# Patient Record
Sex: MALE | Race: WHITE | NOT HISPANIC OR LATINO
[De-identification: names, ages, dates, MRNs, and addresses within clinical notes are randomized per-mention and may not be internally consistent; named-entity substitution may affect disease eponyms.]

---

## 2017-01-23 ENCOUNTER — APPOINTMENT (OUTPATIENT)
Dept: CARDIOLOGY | Facility: CLINIC | Age: 77
End: 2017-01-23

## 2017-01-23 VITALS
HEIGHT: 66 IN | OXYGEN SATURATION: 99 % | DIASTOLIC BLOOD PRESSURE: 63 MMHG | HEART RATE: 60 BPM | BODY MASS INDEX: 25.39 KG/M2 | SYSTOLIC BLOOD PRESSURE: 114 MMHG | WEIGHT: 158 LBS

## 2017-02-09 ENCOUNTER — APPOINTMENT (OUTPATIENT)
Dept: CARDIOLOGY | Facility: CLINIC | Age: 77
End: 2017-02-09

## 2017-03-17 ENCOUNTER — APPOINTMENT (OUTPATIENT)
Dept: CARDIOLOGY | Facility: CLINIC | Age: 77
End: 2017-03-17

## 2017-03-17 VITALS
OXYGEN SATURATION: 97 % | WEIGHT: 158 LBS | HEIGHT: 66 IN | HEART RATE: 66 BPM | SYSTOLIC BLOOD PRESSURE: 125 MMHG | DIASTOLIC BLOOD PRESSURE: 73 MMHG | BODY MASS INDEX: 25.39 KG/M2

## 2017-04-21 ENCOUNTER — APPOINTMENT (OUTPATIENT)
Dept: CARDIOLOGY | Facility: CLINIC | Age: 77
End: 2017-04-21

## 2017-04-21 VITALS — SYSTOLIC BLOOD PRESSURE: 120 MMHG | DIASTOLIC BLOOD PRESSURE: 71 MMHG | BODY MASS INDEX: 25.66 KG/M2 | WEIGHT: 159 LBS

## 2017-05-31 ENCOUNTER — OUTPATIENT (OUTPATIENT)
Dept: OUTPATIENT SERVICES | Facility: HOSPITAL | Age: 77
LOS: 1 days | Discharge: HOME | End: 2017-05-31

## 2017-06-07 ENCOUNTER — OTHER (OUTPATIENT)
Age: 77
End: 2017-06-07

## 2017-06-28 DIAGNOSIS — Z01.818 ENCOUNTER FOR OTHER PREPROCEDURAL EXAMINATION: ICD-10-CM

## 2017-06-28 DIAGNOSIS — I48.0 PAROXYSMAL ATRIAL FIBRILLATION: ICD-10-CM

## 2017-07-03 ENCOUNTER — INPATIENT (INPATIENT)
Facility: HOSPITAL | Age: 77
LOS: 2 days | Discharge: HOME | End: 2017-07-06
Attending: INTERNAL MEDICINE

## 2017-07-03 DIAGNOSIS — I10 ESSENTIAL (PRIMARY) HYPERTENSION: ICD-10-CM

## 2017-07-03 DIAGNOSIS — I48.0 PAROXYSMAL ATRIAL FIBRILLATION: ICD-10-CM

## 2017-07-03 DIAGNOSIS — R55 SYNCOPE AND COLLAPSE: ICD-10-CM

## 2017-07-03 DIAGNOSIS — I45.9 CONDUCTION DISORDER, UNSPECIFIED: ICD-10-CM

## 2017-07-03 DIAGNOSIS — I48.91 UNSPECIFIED ATRIAL FIBRILLATION: ICD-10-CM

## 2017-07-03 DIAGNOSIS — I42.9 CARDIOMYOPATHY, UNSPECIFIED: ICD-10-CM

## 2017-07-11 ENCOUNTER — OUTPATIENT (OUTPATIENT)
Dept: OUTPATIENT SERVICES | Facility: HOSPITAL | Age: 77
LOS: 1 days | Discharge: HOME | End: 2017-07-11

## 2017-07-11 DIAGNOSIS — R55 SYNCOPE AND COLLAPSE: ICD-10-CM

## 2017-07-11 DIAGNOSIS — I48.91 UNSPECIFIED ATRIAL FIBRILLATION: ICD-10-CM

## 2017-07-11 DIAGNOSIS — I45.9 CONDUCTION DISORDER, UNSPECIFIED: ICD-10-CM

## 2017-07-11 DIAGNOSIS — Z79.01 LONG TERM (CURRENT) USE OF ANTICOAGULANTS: ICD-10-CM

## 2017-07-11 DIAGNOSIS — I10 ESSENTIAL (PRIMARY) HYPERTENSION: ICD-10-CM

## 2017-07-11 DIAGNOSIS — I42.9 CARDIOMYOPATHY, UNSPECIFIED: ICD-10-CM

## 2017-07-12 ENCOUNTER — OUTPATIENT (OUTPATIENT)
Dept: OUTPATIENT SERVICES | Facility: HOSPITAL | Age: 77
LOS: 1 days | Discharge: HOME | End: 2017-07-12

## 2017-07-12 DIAGNOSIS — E83.42 HYPOMAGNESEMIA: ICD-10-CM

## 2017-07-12 DIAGNOSIS — I42.9 CARDIOMYOPATHY, UNSPECIFIED: ICD-10-CM

## 2017-07-12 DIAGNOSIS — I25.10 ATHEROSCLEROTIC HEART DISEASE OF NATIVE CORONARY ARTERY WITHOUT ANGINA PECTORIS: ICD-10-CM

## 2017-07-12 DIAGNOSIS — I10 ESSENTIAL (PRIMARY) HYPERTENSION: ICD-10-CM

## 2017-07-12 DIAGNOSIS — R55 SYNCOPE AND COLLAPSE: ICD-10-CM

## 2017-07-12 DIAGNOSIS — Z72.0 TOBACCO USE: ICD-10-CM

## 2017-07-12 DIAGNOSIS — I45.9 CONDUCTION DISORDER, UNSPECIFIED: ICD-10-CM

## 2017-07-12 DIAGNOSIS — I13.0 HYPERTENSIVE HEART AND CHRONIC KIDNEY DISEASE WITH HEART FAILURE AND STAGE 1 THROUGH STAGE 4 CHRONIC KIDNEY DISEASE, OR UNSPECIFIED CHRONIC KIDNEY DISEASE: ICD-10-CM

## 2017-07-12 DIAGNOSIS — I48.91 UNSPECIFIED ATRIAL FIBRILLATION: ICD-10-CM

## 2017-07-12 DIAGNOSIS — Z95.810 PRESENCE OF AUTOMATIC (IMPLANTABLE) CARDIAC DEFIBRILLATOR: ICD-10-CM

## 2017-07-12 DIAGNOSIS — N18.3 CHRONIC KIDNEY DISEASE, STAGE 3 (MODERATE): ICD-10-CM

## 2017-07-12 DIAGNOSIS — Z95.5 PRESENCE OF CORONARY ANGIOPLASTY IMPLANT AND GRAFT: ICD-10-CM

## 2017-07-13 DIAGNOSIS — Z00.6 ENCOUNTER FOR EXAMINATION FOR NORMAL COMPARISON AND CONTROL IN CLINICAL RESEARCH PROGRAM: ICD-10-CM

## 2017-07-14 ENCOUNTER — OUTPATIENT (OUTPATIENT)
Dept: OUTPATIENT SERVICES | Facility: HOSPITAL | Age: 77
LOS: 1 days | Discharge: HOME | End: 2017-07-14

## 2017-07-14 DIAGNOSIS — Z79.01 LONG TERM (CURRENT) USE OF ANTICOAGULANTS: ICD-10-CM

## 2017-07-14 DIAGNOSIS — I48.91 UNSPECIFIED ATRIAL FIBRILLATION: ICD-10-CM

## 2017-07-14 DIAGNOSIS — R55 SYNCOPE AND COLLAPSE: ICD-10-CM

## 2017-07-14 DIAGNOSIS — I10 ESSENTIAL (PRIMARY) HYPERTENSION: ICD-10-CM

## 2017-07-14 DIAGNOSIS — I45.9 CONDUCTION DISORDER, UNSPECIFIED: ICD-10-CM

## 2017-07-14 DIAGNOSIS — I42.9 CARDIOMYOPATHY, UNSPECIFIED: ICD-10-CM

## 2017-07-17 ENCOUNTER — OUTPATIENT (OUTPATIENT)
Dept: OUTPATIENT SERVICES | Facility: HOSPITAL | Age: 77
LOS: 1 days | Discharge: HOME | End: 2017-07-17

## 2017-07-17 DIAGNOSIS — I45.9 CONDUCTION DISORDER, UNSPECIFIED: ICD-10-CM

## 2017-07-17 DIAGNOSIS — I48.91 UNSPECIFIED ATRIAL FIBRILLATION: ICD-10-CM

## 2017-07-17 DIAGNOSIS — I10 ESSENTIAL (PRIMARY) HYPERTENSION: ICD-10-CM

## 2017-07-17 DIAGNOSIS — Z79.01 LONG TERM (CURRENT) USE OF ANTICOAGULANTS: ICD-10-CM

## 2017-07-17 DIAGNOSIS — I42.9 CARDIOMYOPATHY, UNSPECIFIED: ICD-10-CM

## 2017-07-17 DIAGNOSIS — R55 SYNCOPE AND COLLAPSE: ICD-10-CM

## 2017-07-18 DIAGNOSIS — N99.89 OTHER POSTPROCEDURAL COMPLICATIONS AND DISORDERS OF GENITOURINARY SYSTEM: ICD-10-CM

## 2017-07-18 DIAGNOSIS — I48.91 UNSPECIFIED ATRIAL FIBRILLATION: ICD-10-CM

## 2017-07-18 DIAGNOSIS — R33.9 RETENTION OF URINE, UNSPECIFIED: ICD-10-CM

## 2017-07-18 DIAGNOSIS — I50.22 CHRONIC SYSTOLIC (CONGESTIVE) HEART FAILURE: ICD-10-CM

## 2017-07-21 ENCOUNTER — OUTPATIENT (OUTPATIENT)
Dept: OUTPATIENT SERVICES | Facility: HOSPITAL | Age: 77
LOS: 1 days | Discharge: HOME | End: 2017-07-21

## 2017-07-21 DIAGNOSIS — I42.9 CARDIOMYOPATHY, UNSPECIFIED: ICD-10-CM

## 2017-07-21 DIAGNOSIS — R55 SYNCOPE AND COLLAPSE: ICD-10-CM

## 2017-07-21 DIAGNOSIS — I45.9 CONDUCTION DISORDER, UNSPECIFIED: ICD-10-CM

## 2017-07-21 DIAGNOSIS — I48.91 UNSPECIFIED ATRIAL FIBRILLATION: ICD-10-CM

## 2017-07-21 DIAGNOSIS — I10 ESSENTIAL (PRIMARY) HYPERTENSION: ICD-10-CM

## 2017-07-21 DIAGNOSIS — Z79.01 LONG TERM (CURRENT) USE OF ANTICOAGULANTS: ICD-10-CM

## 2017-07-28 ENCOUNTER — OUTPATIENT (OUTPATIENT)
Dept: OUTPATIENT SERVICES | Facility: HOSPITAL | Age: 77
LOS: 1 days | Discharge: HOME | End: 2017-07-28

## 2017-07-28 DIAGNOSIS — I48.91 UNSPECIFIED ATRIAL FIBRILLATION: ICD-10-CM

## 2017-07-28 DIAGNOSIS — I45.9 CONDUCTION DISORDER, UNSPECIFIED: ICD-10-CM

## 2017-07-28 DIAGNOSIS — Z79.01 LONG TERM (CURRENT) USE OF ANTICOAGULANTS: ICD-10-CM

## 2017-07-28 DIAGNOSIS — I10 ESSENTIAL (PRIMARY) HYPERTENSION: ICD-10-CM

## 2017-07-28 DIAGNOSIS — R55 SYNCOPE AND COLLAPSE: ICD-10-CM

## 2017-07-28 DIAGNOSIS — I42.9 CARDIOMYOPATHY, UNSPECIFIED: ICD-10-CM

## 2017-08-04 ENCOUNTER — OUTPATIENT (OUTPATIENT)
Dept: OUTPATIENT SERVICES | Facility: HOSPITAL | Age: 77
LOS: 1 days | Discharge: HOME | End: 2017-08-04

## 2017-08-04 DIAGNOSIS — I45.9 CONDUCTION DISORDER, UNSPECIFIED: ICD-10-CM

## 2017-08-04 DIAGNOSIS — R55 SYNCOPE AND COLLAPSE: ICD-10-CM

## 2017-08-04 DIAGNOSIS — I42.9 CARDIOMYOPATHY, UNSPECIFIED: ICD-10-CM

## 2017-08-04 DIAGNOSIS — I10 ESSENTIAL (PRIMARY) HYPERTENSION: ICD-10-CM

## 2017-08-04 DIAGNOSIS — Z79.01 LONG TERM (CURRENT) USE OF ANTICOAGULANTS: ICD-10-CM

## 2017-08-04 DIAGNOSIS — I48.91 UNSPECIFIED ATRIAL FIBRILLATION: ICD-10-CM

## 2017-08-10 ENCOUNTER — APPOINTMENT (OUTPATIENT)
Dept: CARDIOLOGY | Facility: CLINIC | Age: 77
End: 2017-08-10

## 2017-08-10 ENCOUNTER — OUTPATIENT (OUTPATIENT)
Dept: OUTPATIENT SERVICES | Facility: HOSPITAL | Age: 77
LOS: 1 days | Discharge: HOME | End: 2017-08-10

## 2017-08-10 VITALS — SYSTOLIC BLOOD PRESSURE: 122 MMHG | DIASTOLIC BLOOD PRESSURE: 70 MMHG

## 2017-08-10 VITALS — BODY MASS INDEX: 25.18 KG/M2 | WEIGHT: 156 LBS

## 2017-08-10 DIAGNOSIS — I10 ESSENTIAL (PRIMARY) HYPERTENSION: ICD-10-CM

## 2017-08-10 DIAGNOSIS — I42.9 CARDIOMYOPATHY, UNSPECIFIED: ICD-10-CM

## 2017-08-10 DIAGNOSIS — I45.9 CONDUCTION DISORDER, UNSPECIFIED: ICD-10-CM

## 2017-08-10 DIAGNOSIS — R55 SYNCOPE AND COLLAPSE: ICD-10-CM

## 2017-08-10 DIAGNOSIS — I48.91 UNSPECIFIED ATRIAL FIBRILLATION: ICD-10-CM

## 2017-08-10 DIAGNOSIS — I48.0 PAROXYSMAL ATRIAL FIBRILLATION: ICD-10-CM

## 2017-08-18 ENCOUNTER — OUTPATIENT (OUTPATIENT)
Dept: OUTPATIENT SERVICES | Facility: HOSPITAL | Age: 77
LOS: 1 days | Discharge: HOME | End: 2017-08-18

## 2017-08-18 DIAGNOSIS — I48.91 UNSPECIFIED ATRIAL FIBRILLATION: ICD-10-CM

## 2017-08-18 DIAGNOSIS — I45.9 CONDUCTION DISORDER, UNSPECIFIED: ICD-10-CM

## 2017-08-18 DIAGNOSIS — R55 SYNCOPE AND COLLAPSE: ICD-10-CM

## 2017-08-18 DIAGNOSIS — I10 ESSENTIAL (PRIMARY) HYPERTENSION: ICD-10-CM

## 2017-08-18 DIAGNOSIS — I42.9 CARDIOMYOPATHY, UNSPECIFIED: ICD-10-CM

## 2017-08-18 DIAGNOSIS — Z79.01 LONG TERM (CURRENT) USE OF ANTICOAGULANTS: ICD-10-CM

## 2017-08-24 ENCOUNTER — OUTPATIENT (OUTPATIENT)
Dept: OUTPATIENT SERVICES | Facility: HOSPITAL | Age: 77
LOS: 1 days | Discharge: HOME | End: 2017-08-24

## 2017-08-24 ENCOUNTER — RESULT REVIEW (OUTPATIENT)
Age: 77
End: 2017-08-24

## 2017-08-24 DIAGNOSIS — I10 ESSENTIAL (PRIMARY) HYPERTENSION: ICD-10-CM

## 2017-08-24 DIAGNOSIS — I48.0 PAROXYSMAL ATRIAL FIBRILLATION: ICD-10-CM

## 2017-08-24 DIAGNOSIS — I42.9 CARDIOMYOPATHY, UNSPECIFIED: ICD-10-CM

## 2017-08-24 DIAGNOSIS — R55 SYNCOPE AND COLLAPSE: ICD-10-CM

## 2017-08-24 DIAGNOSIS — I48.91 UNSPECIFIED ATRIAL FIBRILLATION: ICD-10-CM

## 2017-08-24 DIAGNOSIS — I45.9 CONDUCTION DISORDER, UNSPECIFIED: ICD-10-CM

## 2017-08-24 RX ORDER — WARFARIN 5 MG/1
5 TABLET ORAL DAILY
Qty: 20 | Refills: 0 | Status: DISCONTINUED | COMMUNITY
Start: 2017-08-10 | End: 2017-08-24

## 2017-09-26 ENCOUNTER — CLINICAL ADVICE (OUTPATIENT)
Age: 77
End: 2017-09-26

## 2017-09-26 ENCOUNTER — RESULT REVIEW (OUTPATIENT)
Age: 77
End: 2017-09-26

## 2017-09-26 ENCOUNTER — OUTPATIENT (OUTPATIENT)
Dept: OUTPATIENT SERVICES | Facility: HOSPITAL | Age: 77
LOS: 1 days | Discharge: HOME | End: 2017-09-26

## 2017-09-26 DIAGNOSIS — R55 SYNCOPE AND COLLAPSE: ICD-10-CM

## 2017-09-26 DIAGNOSIS — I48.2 CHRONIC ATRIAL FIBRILLATION: ICD-10-CM

## 2017-09-26 DIAGNOSIS — I48.91 UNSPECIFIED ATRIAL FIBRILLATION: ICD-10-CM

## 2017-09-26 DIAGNOSIS — I45.9 CONDUCTION DISORDER, UNSPECIFIED: ICD-10-CM

## 2017-09-26 DIAGNOSIS — I10 ESSENTIAL (PRIMARY) HYPERTENSION: ICD-10-CM

## 2017-09-26 DIAGNOSIS — I42.9 CARDIOMYOPATHY, UNSPECIFIED: ICD-10-CM

## 2017-10-27 ENCOUNTER — APPOINTMENT (OUTPATIENT)
Dept: CARDIOLOGY | Facility: CLINIC | Age: 77
End: 2017-10-27

## 2017-10-27 VITALS — WEIGHT: 155 LBS | BODY MASS INDEX: 25.02 KG/M2

## 2017-10-27 VITALS — SYSTOLIC BLOOD PRESSURE: 110 MMHG | OXYGEN SATURATION: 98 % | HEART RATE: 68 BPM | DIASTOLIC BLOOD PRESSURE: 70 MMHG

## 2017-10-31 ENCOUNTER — OUTPATIENT (OUTPATIENT)
Dept: OUTPATIENT SERVICES | Facility: HOSPITAL | Age: 77
LOS: 1 days | Discharge: HOME | End: 2017-10-31

## 2017-10-31 DIAGNOSIS — R73.01 IMPAIRED FASTING GLUCOSE: ICD-10-CM

## 2017-10-31 DIAGNOSIS — I48.91 UNSPECIFIED ATRIAL FIBRILLATION: ICD-10-CM

## 2017-10-31 DIAGNOSIS — I42.9 CARDIOMYOPATHY, UNSPECIFIED: ICD-10-CM

## 2017-10-31 DIAGNOSIS — I10 ESSENTIAL (PRIMARY) HYPERTENSION: ICD-10-CM

## 2017-10-31 DIAGNOSIS — I45.9 CONDUCTION DISORDER, UNSPECIFIED: ICD-10-CM

## 2017-10-31 DIAGNOSIS — R55 SYNCOPE AND COLLAPSE: ICD-10-CM

## 2018-02-09 ENCOUNTER — APPOINTMENT (OUTPATIENT)
Dept: CARDIOLOGY | Facility: CLINIC | Age: 78
End: 2018-02-09

## 2018-02-09 VITALS — BODY MASS INDEX: 25.5 KG/M2 | SYSTOLIC BLOOD PRESSURE: 120 MMHG | WEIGHT: 158 LBS | DIASTOLIC BLOOD PRESSURE: 70 MMHG

## 2018-02-09 RX ORDER — BENAZEPRIL HYDROCHLORIDE 10 MG/1
10 TABLET, FILM COATED ORAL
Refills: 0 | Status: DISCONTINUED | COMMUNITY
End: 2018-02-09

## 2018-02-26 ENCOUNTER — OUTPATIENT (OUTPATIENT)
Dept: OUTPATIENT SERVICES | Facility: HOSPITAL | Age: 78
LOS: 1 days | Discharge: HOME | End: 2018-02-26

## 2018-02-26 DIAGNOSIS — I38 ENDOCARDITIS, VALVE UNSPECIFIED: ICD-10-CM

## 2018-02-26 DIAGNOSIS — E78.5 HYPERLIPIDEMIA, UNSPECIFIED: ICD-10-CM

## 2018-05-18 ENCOUNTER — OTHER (OUTPATIENT)
Age: 78
End: 2018-05-18

## 2018-07-05 ENCOUNTER — OUTPATIENT (OUTPATIENT)
Dept: OUTPATIENT SERVICES | Facility: HOSPITAL | Age: 78
LOS: 1 days | Discharge: HOME | End: 2018-07-05

## 2018-07-05 DIAGNOSIS — R73.01 IMPAIRED FASTING GLUCOSE: ICD-10-CM

## 2018-08-07 ENCOUNTER — OUTPATIENT (OUTPATIENT)
Dept: OUTPATIENT SERVICES | Facility: HOSPITAL | Age: 78
LOS: 1 days | Discharge: HOME | End: 2018-08-07

## 2018-08-07 DIAGNOSIS — E87.5 HYPERKALEMIA: ICD-10-CM

## 2018-10-19 ENCOUNTER — APPOINTMENT (OUTPATIENT)
Dept: CARDIOLOGY | Facility: CLINIC | Age: 78
End: 2018-10-19

## 2018-10-19 VITALS — DIASTOLIC BLOOD PRESSURE: 66 MMHG | SYSTOLIC BLOOD PRESSURE: 128 MMHG | WEIGHT: 156 LBS | BODY MASS INDEX: 25.18 KG/M2

## 2018-10-19 RX ORDER — CLOPIDOGREL BISULFATE 75 MG/1
75 TABLET, FILM COATED ORAL DAILY
Qty: 90 | Refills: 3 | Status: DISCONTINUED | COMMUNITY
Start: 2017-08-24 | End: 2018-10-19

## 2018-12-31 ENCOUNTER — OUTPATIENT (OUTPATIENT)
Dept: OUTPATIENT SERVICES | Facility: HOSPITAL | Age: 78
LOS: 1 days | Discharge: HOME | End: 2018-12-31

## 2018-12-31 DIAGNOSIS — R73.01 IMPAIRED FASTING GLUCOSE: ICD-10-CM

## 2019-03-01 ENCOUNTER — OUTPATIENT (OUTPATIENT)
Dept: OUTPATIENT SERVICES | Facility: HOSPITAL | Age: 79
LOS: 1 days | Discharge: HOME | End: 2019-03-01

## 2019-03-01 DIAGNOSIS — E83.52 HYPERCALCEMIA: ICD-10-CM

## 2019-03-01 DIAGNOSIS — E78.00 PURE HYPERCHOLESTEROLEMIA, UNSPECIFIED: ICD-10-CM

## 2019-03-01 DIAGNOSIS — D64.9 ANEMIA, UNSPECIFIED: ICD-10-CM

## 2019-03-01 DIAGNOSIS — N18.3 CHRONIC KIDNEY DISEASE, STAGE 3 (MODERATE): ICD-10-CM

## 2019-04-19 ENCOUNTER — APPOINTMENT (OUTPATIENT)
Dept: CARDIOLOGY | Facility: CLINIC | Age: 79
End: 2019-04-19
Payer: MEDICARE

## 2019-04-19 VITALS
WEIGHT: 156 LBS | HEIGHT: 66 IN | BODY MASS INDEX: 25.07 KG/M2 | DIASTOLIC BLOOD PRESSURE: 76 MMHG | SYSTOLIC BLOOD PRESSURE: 110 MMHG

## 2019-04-19 PROCEDURE — 93284 PRGRMG EVAL IMPLANTABLE DFB: CPT

## 2019-04-19 PROCEDURE — 99213 OFFICE O/P EST LOW 20 MIN: CPT

## 2019-04-19 NOTE — PHYSICAL EXAM
[Heart Rate And Rhythm] : heart rate and rhythm were normal [General Appearance - Well Developed] : well developed [Arterial Pulses Normal] : the arterial pulses were normal [] : no respiratory distress [Heart Sounds] : normal S1 and S2 [Clean] : clean [Left Infraclavicular] : left infraclavicular area [Respiration, Rhythm And Depth] : normal respiratory rhythm and effort [Dry] : dry [Well-Healed] : well-healed [Abdomen Soft] : soft [Bowel Sounds] : normal bowel sounds [Nail Clubbing] : no clubbing of the fingernails [Cyanosis, Localized] : no localized cyanosis

## 2019-05-01 ENCOUNTER — OUTPATIENT (OUTPATIENT)
Dept: OUTPATIENT SERVICES | Facility: HOSPITAL | Age: 79
LOS: 1 days | Discharge: HOME | End: 2019-05-01

## 2019-05-01 DIAGNOSIS — R73.01 IMPAIRED FASTING GLUCOSE: ICD-10-CM

## 2019-05-27 NOTE — PROCEDURE
[No] : not [NSR] : normal sinus rhythm [CRT-D] : Cardiac resynchronization therapy defibrillator [DDDR] : DDDR [Voltage: ___ volts] : Voltage was [unfilled] volts [Charge Time: ___ sec] : charge time was [unfilled] seconds [Longevity: ___ months] : The estimated remaining battery life is [unfilled] months [Sensing Amplitude ___mv] : sensing amplitude was [unfilled] mv [___V @] : [unfilled] V [Lead Imp:  ___ohms] : lead impedance was [unfilled] ohms [Programmed for Longevity] : output reprogrammed for improved battery longevity [___ ms] : [unfilled] ms [Asense-Vsense ___ %] : Asense-Vsense [unfilled]% [Asense-Vpace ___ %] : Asense-Vpace [unfilled]% [Apace-Vsense ___ %] : Apace-Vsense [unfilled]% [Apace-Vpace ___ %] : Apace-Vpace [unfilled]% [de-identified] : Medtronic [de-identified] : [id number] [de-identified] : Amplia MRI Quad [de-identified] : 60 [de-identified] : 6/30/16 [de-identified] : 1

## 2019-05-27 NOTE — REVIEW OF SYSTEMS
[Easy Bruising] : a tendency for easy bruising [Fever] : no fever [Blurry Vision] : no blurred vision [Chest Pain] : no chest pain [Shortness Of Breath] : no shortness of breath [Lower Ext Edema] : no extremity edema [Palpitations] : no palpitations [Abdominal Pain] : no abdominal pain [Cough] : no cough [Nocturia] : no nocturia [Urinary Frequency] : no change in urinary frequency [Joint Pain] : no joint pain [Skin: A Rash] : no rash: [Dizziness] : no dizziness [Confusion] : no confusion was observed [Anxiety] : no anxiety [Excessive Thirst] : no polydipsia [Easy Bleeding] : no tendency for easy bleeding

## 2019-05-27 NOTE — HISTORY OF PRESENT ILLNESS
[Erythema at Site] : no erythema at device site [Swelling at Site] : no swelling at device site [de-identified] : \par 78 years old male with pmh of CAD, MI S/P PTCA with stent, ICM/ HF -NYHA II ,  S/P CRT- D , PAF, GI bleed on Eliquis . He underwent a Watchman procedure on 7/6/2017 , S/P HILARIA , Stopped Warfarin 8/24/2017\par Denies CP/SOB/palpitations \par NO dizziness or syncope \par Denies AICD shocks \par No LE edema\par Good activity and exercise tolerance, jogs on a treadmill 7 days

## 2019-07-12 ENCOUNTER — OTHER (OUTPATIENT)
Age: 79
End: 2019-07-12

## 2019-08-08 ENCOUNTER — APPOINTMENT (OUTPATIENT)
Dept: CARDIOLOGY | Facility: CLINIC | Age: 79
End: 2019-08-08
Payer: MEDICARE

## 2019-08-08 PROCEDURE — 93295 DEV INTERROG REMOTE 1/2/MLT: CPT

## 2019-08-08 PROCEDURE — 93296 REM INTERROG EVL PM/IDS: CPT

## 2019-08-12 ENCOUNTER — EMERGENCY (EMERGENCY)
Facility: HOSPITAL | Age: 79
LOS: 0 days | Discharge: HOME | End: 2019-08-12
Attending: EMERGENCY MEDICINE | Admitting: EMERGENCY MEDICINE
Payer: MEDICARE

## 2019-08-12 VITALS — DIASTOLIC BLOOD PRESSURE: 62 MMHG | RESPIRATION RATE: 18 BRPM | HEART RATE: 76 BPM | SYSTOLIC BLOOD PRESSURE: 132 MMHG

## 2019-08-12 VITALS
TEMPERATURE: 97 F | DIASTOLIC BLOOD PRESSURE: 57 MMHG | SYSTOLIC BLOOD PRESSURE: 127 MMHG | RESPIRATION RATE: 18 BRPM | HEART RATE: 82 BPM | OXYGEN SATURATION: 99 %

## 2019-08-12 DIAGNOSIS — N39.0 URINARY TRACT INFECTION, SITE NOT SPECIFIED: ICD-10-CM

## 2019-08-12 DIAGNOSIS — R33.9 RETENTION OF URINE, UNSPECIFIED: ICD-10-CM

## 2019-08-12 LAB
ALBUMIN SERPL ELPH-MCNC: 4.3 G/DL — SIGNIFICANT CHANGE UP (ref 3.5–5.2)
ALP SERPL-CCNC: 53 U/L — SIGNIFICANT CHANGE UP (ref 30–115)
ALT FLD-CCNC: 19 U/L — SIGNIFICANT CHANGE UP (ref 0–41)
ANION GAP SERPL CALC-SCNC: 14 MMOL/L — SIGNIFICANT CHANGE UP (ref 7–14)
APPEARANCE UR: CLEAR — SIGNIFICANT CHANGE UP
AST SERPL-CCNC: 20 U/L — SIGNIFICANT CHANGE UP (ref 0–41)
BACTERIA # UR AUTO: ABNORMAL /HPF
BASOPHILS # BLD AUTO: 0.02 K/UL — SIGNIFICANT CHANGE UP (ref 0–0.2)
BASOPHILS NFR BLD AUTO: 0.3 % — SIGNIFICANT CHANGE UP (ref 0–1)
BILIRUB SERPL-MCNC: 0.5 MG/DL — SIGNIFICANT CHANGE UP (ref 0.2–1.2)
BILIRUB UR-MCNC: NEGATIVE — SIGNIFICANT CHANGE UP
BUN SERPL-MCNC: 41 MG/DL — HIGH (ref 10–20)
CALCIUM SERPL-MCNC: 9.6 MG/DL — SIGNIFICANT CHANGE UP (ref 8.5–10.1)
CHLORIDE SERPL-SCNC: 100 MMOL/L — SIGNIFICANT CHANGE UP (ref 98–110)
CO2 SERPL-SCNC: 24 MMOL/L — SIGNIFICANT CHANGE UP (ref 17–32)
COLOR SPEC: YELLOW — SIGNIFICANT CHANGE UP
CREAT SERPL-MCNC: 1.9 MG/DL — HIGH (ref 0.7–1.5)
DIFF PNL FLD: NEGATIVE — SIGNIFICANT CHANGE UP
EOSINOPHIL # BLD AUTO: 0.12 K/UL — SIGNIFICANT CHANGE UP (ref 0–0.7)
EOSINOPHIL NFR BLD AUTO: 1.9 % — SIGNIFICANT CHANGE UP (ref 0–8)
GLUCOSE SERPL-MCNC: 108 MG/DL — HIGH (ref 70–99)
GLUCOSE UR QL: NEGATIVE — SIGNIFICANT CHANGE UP
HCT VFR BLD CALC: 36.4 % — LOW (ref 42–52)
HGB BLD-MCNC: 11.6 G/DL — LOW (ref 14–18)
IMM GRANULOCYTES NFR BLD AUTO: 0.5 % — HIGH (ref 0.1–0.3)
KETONES UR-MCNC: NEGATIVE — SIGNIFICANT CHANGE UP
LEUKOCYTE ESTERASE UR-ACNC: ABNORMAL
LYMPHOCYTES # BLD AUTO: 1.14 K/UL — LOW (ref 1.2–3.4)
LYMPHOCYTES # BLD AUTO: 18.4 % — LOW (ref 20.5–51.1)
MCHC RBC-ENTMCNC: 27 PG — SIGNIFICANT CHANGE UP (ref 27–31)
MCHC RBC-ENTMCNC: 31.9 G/DL — LOW (ref 32–37)
MCV RBC AUTO: 84.7 FL — SIGNIFICANT CHANGE UP (ref 80–94)
MONOCYTES # BLD AUTO: 0.71 K/UL — HIGH (ref 0.1–0.6)
MONOCYTES NFR BLD AUTO: 11.5 % — HIGH (ref 1.7–9.3)
NEUTROPHILS # BLD AUTO: 4.16 K/UL — SIGNIFICANT CHANGE UP (ref 1.4–6.5)
NEUTROPHILS NFR BLD AUTO: 67.4 % — SIGNIFICANT CHANGE UP (ref 42.2–75.2)
NITRITE UR-MCNC: NEGATIVE — SIGNIFICANT CHANGE UP
NRBC # BLD: 0 /100 WBCS — SIGNIFICANT CHANGE UP (ref 0–0)
PH UR: 6 — SIGNIFICANT CHANGE UP (ref 5–8)
PLATELET # BLD AUTO: 241 K/UL — SIGNIFICANT CHANGE UP (ref 130–400)
POTASSIUM SERPL-MCNC: 4.4 MMOL/L — SIGNIFICANT CHANGE UP (ref 3.5–5)
POTASSIUM SERPL-SCNC: 4.4 MMOL/L — SIGNIFICANT CHANGE UP (ref 3.5–5)
PROT SERPL-MCNC: 7.1 G/DL — SIGNIFICANT CHANGE UP (ref 6–8)
PROT UR-MCNC: ABNORMAL
RBC # BLD: 4.3 M/UL — LOW (ref 4.7–6.1)
RBC # FLD: 13.2 % — SIGNIFICANT CHANGE UP (ref 11.5–14.5)
SODIUM SERPL-SCNC: 138 MMOL/L — SIGNIFICANT CHANGE UP (ref 135–146)
SP GR SPEC: 1.02 — SIGNIFICANT CHANGE UP (ref 1.01–1.03)
UROBILINOGEN FLD QL: 0.2 — SIGNIFICANT CHANGE UP (ref 0.2–0.2)
WBC # BLD: 6.18 K/UL — SIGNIFICANT CHANGE UP (ref 4.8–10.8)
WBC # FLD AUTO: 6.18 K/UL — SIGNIFICANT CHANGE UP (ref 4.8–10.8)
WBC UR QL: ABNORMAL /HPF

## 2019-08-12 PROCEDURE — 99284 EMERGENCY DEPT VISIT MOD MDM: CPT

## 2019-08-12 RX ORDER — CEFPODOXIME PROXETIL 100 MG
1 TABLET ORAL
Qty: 14 | Refills: 0
Start: 2019-08-12 | End: 2019-08-18

## 2019-08-12 RX ORDER — CEFTRIAXONE 500 MG/1
1000 INJECTION, POWDER, FOR SOLUTION INTRAMUSCULAR; INTRAVENOUS ONCE
Refills: 0 | Status: COMPLETED | OUTPATIENT
Start: 2019-08-12 | End: 2019-08-12

## 2019-08-12 RX ORDER — SODIUM CHLORIDE 9 MG/ML
1000 INJECTION, SOLUTION INTRAVENOUS ONCE
Refills: 0 | Status: COMPLETED | OUTPATIENT
Start: 2019-08-12 | End: 2019-08-12

## 2019-08-12 RX ADMIN — CEFTRIAXONE 1000 MILLIGRAM(S): 500 INJECTION, POWDER, FOR SOLUTION INTRAMUSCULAR; INTRAVENOUS at 20:35

## 2019-08-12 RX ADMIN — SODIUM CHLORIDE 1000 MILLILITER(S): 9 INJECTION, SOLUTION INTRAVENOUS at 17:12

## 2019-08-12 RX ADMIN — CEFTRIAXONE 100 MILLIGRAM(S): 500 INJECTION, POWDER, FOR SOLUTION INTRAMUSCULAR; INTRAVENOUS at 20:35

## 2019-08-12 RX ADMIN — SODIUM CHLORIDE 1000 MILLILITER(S): 9 INJECTION, SOLUTION INTRAVENOUS at 20:35

## 2019-08-12 NOTE — ED ADULT NURSE NOTE - NSIMPLEMENTINTERV_GEN_ALL_ED
Implemented All Universal Safety Interventions:  [place] to call system. Call bell, personal items and telephone within reach. Instruct patient to call for assistance. Room bathroom lighting operational. Non-slip footwear when patient is off stretcher. Physically safe environment: no spills, clutter or unnecessary equipment. Stretcher in lowest position, wheels locked, appropriate side rails in place.

## 2019-08-12 NOTE — ED PROVIDER NOTE - CLINICAL SUMMARY MEDICAL DECISION MAKING FREE TEXT BOX
pt was able to urinate on his own after IVF, UA+ and pt symptomatic, will dc home w po abx, f/u  1-2 weeks, strict return precautions provided.

## 2019-08-12 NOTE — ED PROVIDER NOTE - NS ED ROS FT
Constitutional: See HPI.  Eyes: No visual changes,  ENMT: No neck pain  Cardiac: No cp  Respiratory: No cough  GI: No nausea, vomiting, diarrhea or abdominal pain.  : see hpi  MS: No myalgia, muscle weakness, joint pain or back pain.  Neuro: No headache or weakness  Skin: No skin rash.

## 2019-08-12 NOTE — ED ADULT NURSE REASSESSMENT NOTE - NS ED NURSE REASSESS COMMENT FT1
patient bladder scann showed 65cc of urine, patient just voided 50cc. ua and culture sent to lab. awaiting results. as per md no need for urinary cath at this time

## 2019-08-12 NOTE — ED PROVIDER NOTE - CARE PROVIDER_API CALL
Chance Oro)  Urology  [address] [place] [address]  Phone: [phone]  Fax: [phone]  Follow Up Time: 1-3 Days

## 2019-08-12 NOTE — ED PROVIDER NOTE - OBJECTIVE STATEMENT
78 year old male w/ a pmh of htn hld & pacemaker presents here for urinary retention. Patient states for the last 3 days he's been having frequent urinary no hematuria or burning with urination. patient went to see his pmd today and was told to give urine but has been unable to void. Patient last voided at 1pm today. Patient denies any fever chills cough cp sob n/v abdominal pain.

## 2019-08-12 NOTE — ED PROVIDER NOTE - ATTENDING CONTRIBUTION TO CARE
78M PMH CAD stent AICD, CKD ([facility]), HTM, HL, BPH, sent by pmd for urinary retention since 1pm. states he went to PMD today c/o 3 days dysuria, urinary freq. no hematuria. no fever. no n/v. no abd pain, flank pain. his pmd dr vicente sent him to obtain urine sample r/o infection but he was unable to provide sample and was sent by nurses to ED.    on exam, AFVSS, well tj nad, ncat, eomi, perrla, mmm, lctab, rrr nl s1s2 no mrg, abd soft ntnd, no cvat, aaox3, no focal deficits, no le edema or calf ttp,     POCUS bladder- less than 50cc Urine.     a/p; unlikely urinary retention, likely UTI, will do labs, UA/cxr, ivf, re-eval.

## 2019-08-12 NOTE — ED ADULT NURSE NOTE - OBJECTIVE STATEMENT
Pt presented to ED d/t urinary retention. Pt unable to urinate since 1300, c/o dysuria x 3 days. Denies n/v/d/fevers/chills.

## 2019-08-14 LAB
CULTURE RESULTS: SIGNIFICANT CHANGE UP
SPECIMEN SOURCE: SIGNIFICANT CHANGE UP

## 2019-08-26 ENCOUNTER — APPOINTMENT (OUTPATIENT)
Dept: UROLOGY | Facility: CLINIC | Age: 79
End: 2019-08-26
Payer: MEDICARE

## 2019-08-26 PROCEDURE — 99204 OFFICE O/P NEW MOD 45 MIN: CPT

## 2019-08-26 NOTE — ASSESSMENT
[FreeTextEntry1] : JANETTE SAUL is a 79 year old male who presents with BPH and intermittent urinary retention with dysuria. \par Given this has happened on the lease 2 occasions I am recommending Flomax. Followup w renal bladder ultrasound postvoid residual\par The patient understands that this medication may cause dizziness, retrograde ejaculation or nasal congestion among other complications. I recommended that the patient take this medication at night. If the side effects become too bothersome, the medication can be discontinued. He does not have any future plans for eye surgery.\par \par

## 2019-08-26 NOTE — PHYSICAL EXAM
[General Appearance - Well Developed] : well developed [General Appearance - Well Nourished] : well nourished [Normal Appearance] : normal appearance [Well Groomed] : well groomed [Edema] : no peripheral edema [General Appearance - In No Acute Distress] : no acute distress [Respiration, Rhythm And Depth] : normal respiratory rhythm and effort [Exaggerated Use Of Accessory Muscles For Inspiration] : no accessory muscle use [Abdomen Soft] : soft [Costovertebral Angle Tenderness] : no ~M costovertebral angle tenderness [Abdomen Tenderness] : non-tender [Urinary Bladder Findings] : the bladder was normal on palpation [Normal Station and Gait] : the gait and station were normal for the patient's age [] : no rash [No Focal Deficits] : no focal deficits [Oriented To Time, Place, And Person] : oriented to person, place, and time [Not Anxious] : not anxious [Mood] : the mood was normal [Affect] : the affect was normal [No Palpable Adenopathy] : no palpable adenopathy

## 2019-08-26 NOTE — HISTORY OF PRESENT ILLNESS
[FreeTextEntry1] : JANETTE SAUL is a 79 year old male who presents with BPH and intermittent urinary retention with dysuria. \par Patient was sent to the emergency room 2 weeks ago as he was having difficulty urinating and dysuria. He visited the emergency room was found to have pyuria given a course of antibiotics.Urinalysis negative for microscopic hematuria\par An ER bedside ultrasound showed 30 mL in the bladder and a final urine culture shows no growth to date.\par He states dysuria has resolved and he denies any either some lower urinary tract symptoms. Nocturia is one time. \par Denies gross hematuria, dysuria or associated symptoms. Denies pelvic pain.\par \par Denies  PMH including kidney stones, recurrent UTIs. \par Family History: unknown as pt adopted\par \par Old records reviewed\par Cr 1.9 similar to prior\par Prior bladder sono 2016 78g prostate and pt unable to void 450 ml volume\par

## 2019-09-03 ENCOUNTER — OUTPATIENT (OUTPATIENT)
Dept: OUTPATIENT SERVICES | Facility: HOSPITAL | Age: 79
LOS: 1 days | Discharge: HOME | End: 2019-09-03

## 2019-09-03 DIAGNOSIS — D64.9 ANEMIA, UNSPECIFIED: ICD-10-CM

## 2019-09-03 DIAGNOSIS — E55.9 VITAMIN D DEFICIENCY, UNSPECIFIED: ICD-10-CM

## 2019-09-03 DIAGNOSIS — E78.00 PURE HYPERCHOLESTEROLEMIA, UNSPECIFIED: ICD-10-CM

## 2019-09-03 DIAGNOSIS — N18.3 CHRONIC KIDNEY DISEASE, STAGE 3 (MODERATE): ICD-10-CM

## 2019-09-24 ENCOUNTER — OTHER (OUTPATIENT)
Age: 79
End: 2019-09-24

## 2019-09-26 ENCOUNTER — FORM ENCOUNTER (OUTPATIENT)
Age: 79
End: 2019-09-26

## 2019-09-27 ENCOUNTER — OUTPATIENT (OUTPATIENT)
Dept: OUTPATIENT SERVICES | Facility: HOSPITAL | Age: 79
LOS: 1 days | Discharge: HOME | End: 2019-09-27
Payer: MEDICARE

## 2019-09-27 DIAGNOSIS — N40.1 BENIGN PROSTATIC HYPERPLASIA WITH LOWER URINARY TRACT SYMPTOMS: ICD-10-CM

## 2019-09-27 PROCEDURE — 76770 US EXAM ABDO BACK WALL COMP: CPT | Mod: 26

## 2019-10-20 ENCOUNTER — LABORATORY RESULT (OUTPATIENT)
Age: 79
End: 2019-10-20

## 2019-10-21 ENCOUNTER — APPOINTMENT (OUTPATIENT)
Dept: UROLOGY | Facility: CLINIC | Age: 79
End: 2019-10-21
Payer: MEDICARE

## 2019-10-21 ENCOUNTER — OUTPATIENT (OUTPATIENT)
Dept: OUTPATIENT SERVICES | Facility: HOSPITAL | Age: 79
LOS: 1 days | Discharge: HOME | End: 2019-10-21

## 2019-10-21 DIAGNOSIS — R30.0 DYSURIA: ICD-10-CM

## 2019-10-21 DIAGNOSIS — N40.1 BENIGN PROSTATIC HYPERPLASIA WITH LOWER URINARY TRACT SYMPTOMS: ICD-10-CM

## 2019-10-21 PROCEDURE — 99214 OFFICE O/P EST MOD 30 MIN: CPT

## 2019-10-21 NOTE — ASSESSMENT
[FreeTextEntry1] : JANETTE SAUL is a 79 year old male who presents with BPH and intermittent urinary retention on flomax.\par Now reports he is voiding well.\par There is a high postvoid residual on bladder ultrasound. Given the patient was previously on prostate medicines he prefers to maximize medical management prior to surgical management (we discussed TURP, etc).\par Add finasteride, discussed sexual side effects\par urinalysis today

## 2019-10-21 NOTE — HISTORY OF PRESENT ILLNESS
[FreeTextEntry1] : JANETTE SAUL is a 79 year old male who presents with BPH and intermittent urinary retention on flomax.\par Now reports he is voiding well. Good force of stream. Nocturia 2x.\par \par Renal and bladder ultrasound shows post void residual 147 g with BPH 71 g.\par Left renal cyst.\par \par Prev Urinalysis negative for microscopic hematuria\par Denies gross hematuria, dysuria or associated symptoms. Denies pelvic pain.\par \par Denies  PMH including kidney stones, recurrent UTIs. \par Family History: unknown as pt adopted\par \par Old records reviewed\par Cr 1.9 similar to prior. States had prior PSA which were normal.\par Prior bladder sono 2016 78g prostate and pt unable to void 450 ml volume\par

## 2019-10-21 NOTE — PHYSICAL EXAM
[General Appearance - Well Developed] : well developed [General Appearance - Well Nourished] : well nourished [Normal Appearance] : normal appearance [Well Groomed] : well groomed [General Appearance - In No Acute Distress] : no acute distress [Abdomen Soft] : soft [Abdomen Tenderness] : non-tender [Urinary Bladder Findings] : the bladder was normal on palpation [Costovertebral Angle Tenderness] : no ~M costovertebral angle tenderness [Edema] : no peripheral edema [] : no respiratory distress [Respiration, Rhythm And Depth] : normal respiratory rhythm and effort [Exaggerated Use Of Accessory Muscles For Inspiration] : no accessory muscle use [Oriented To Time, Place, And Person] : oriented to person, place, and time [Mood] : the mood was normal [Affect] : the affect was normal [Not Anxious] : not anxious [Normal Station and Gait] : the gait and station were normal for the patient's age [No Palpable Adenopathy] : no palpable adenopathy [No Focal Deficits] : no focal deficits

## 2019-10-22 LAB
APPEARANCE: ABNORMAL
BILIRUBIN URINE: NEGATIVE
BLOOD URINE: NEGATIVE
COLOR: ABNORMAL
GLUCOSE QUALITATIVE U: NEGATIVE
KETONES URINE: NORMAL
LEUKOCYTE ESTERASE URINE: ABNORMAL
NITRITE URINE: NEGATIVE
PH URINE: 5.5
PROTEIN URINE: ABNORMAL
SPECIFIC GRAVITY URINE: 1.03
UROBILINOGEN URINE: NORMAL

## 2019-11-01 ENCOUNTER — APPOINTMENT (OUTPATIENT)
Dept: CARDIOLOGY | Facility: CLINIC | Age: 79
End: 2019-11-01
Payer: MEDICARE

## 2019-11-01 VITALS
WEIGHT: 149 LBS | DIASTOLIC BLOOD PRESSURE: 62 MMHG | SYSTOLIC BLOOD PRESSURE: 112 MMHG | BODY MASS INDEX: 24.05 KG/M2 | HEART RATE: 79 BPM

## 2019-11-01 PROCEDURE — 99213 OFFICE O/P EST LOW 20 MIN: CPT

## 2019-11-01 PROCEDURE — 93290 INTERROG DEV EVAL ICPMS IP: CPT

## 2019-11-01 PROCEDURE — 93282 PRGRMG EVAL IMPLANTABLE DFB: CPT
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
## 2019-11-01 NOTE — PHYSICAL EXAM
[General Appearance - Well Developed] : well developed [Heart Rate And Rhythm] : heart rate and rhythm were normal [Heart Sounds] : normal S1 and S2 [Arterial Pulses Normal] : the arterial pulses were normal [] : no respiratory distress [Respiration, Rhythm And Depth] : normal respiratory rhythm and effort [Left Infraclavicular] : left infraclavicular area [Clean] : clean [Dry] : dry [Well-Healed] : well-healed [Bowel Sounds] : normal bowel sounds [Abdomen Soft] : soft [Nail Clubbing] : no clubbing of the fingernails [Cyanosis, Localized] : no localized cyanosis

## 2019-11-01 NOTE — ASSESSMENT
[FreeTextEntry1] : \par \par AICD interrogation today: Normal CRT-D function . PAF . Pt is asymptomatic . Rate controlled. \par 1 episode of NSVT , no shocks . Thoracic impedance - stable . All parameters stable \par AFIB burden <0.1%\par Currently on  mg \par \par Plan \par -Continue  mg daily \par -Continue remote monitoring\par -F/U in 6 months  \par I have also advised the patient to go to the nearest emergency room if he experiences any chest pain, dyspnea, syncope, or has any other compelling symptoms.\par \par \par \par \par \par \par

## 2019-11-01 NOTE — HISTORY OF PRESENT ILLNESS
[None] : The patient complains of no symptoms [Palpitations] : no palpitations [SOB] : no dyspnea [Erythema at Site] : no erythema at device site [Swelling at Site] : no swelling at device site [de-identified] : \par 79  years old male with pmh of CAD, MI S/P PTCA with stent, ICM/ HF -NYHA II ,  S/P CRT- D , PAF, GI bleed on Eliquis . He underwent a Watchman procedure on 7/6/2017 , S/P HILARIA , Stopped Warfarin 8/24/2017\par Denies CP/SOB/palpitations \par NO dizziness or syncope \par Denies AICD shocks \par No LE edema\par Good activity and exercise tolerance, jogs on a treadmill 7 days

## 2019-11-01 NOTE — REVIEW OF SYSTEMS
[Fever] : no fever [Blurry Vision] : no blurred vision [Shortness Of Breath] : no shortness of breath [Chest Pain] : no chest pain [Lower Ext Edema] : no extremity edema [Palpitations] : no palpitations [Cough] : no cough [Abdominal Pain] : no abdominal pain [Urinary Frequency] : no change in urinary frequency [Nocturia] : no nocturia [Joint Pain] : no joint pain [Skin: A Rash] : no rash: [Dizziness] : no dizziness [Confusion] : no confusion was observed [Anxiety] : no anxiety [Excessive Thirst] : no polydipsia [Easy Bleeding] : no tendency for easy bleeding [Easy Bruising] : a tendency for easy bruising

## 2020-03-24 ENCOUNTER — EMERGENCY (EMERGENCY)
Facility: HOSPITAL | Age: 80
LOS: 0 days | Discharge: HOME | End: 2020-03-25
Attending: EMERGENCY MEDICINE | Admitting: EMERGENCY MEDICINE
Payer: MEDICARE

## 2020-03-24 VITALS
RESPIRATION RATE: 18 BRPM | TEMPERATURE: 97 F | SYSTOLIC BLOOD PRESSURE: 159 MMHG | DIASTOLIC BLOOD PRESSURE: 88 MMHG | OXYGEN SATURATION: 98 % | HEART RATE: 88 BPM

## 2020-03-24 VITALS
SYSTOLIC BLOOD PRESSURE: 148 MMHG | RESPIRATION RATE: 18 BRPM | HEART RATE: 77 BPM | OXYGEN SATURATION: 98 % | DIASTOLIC BLOOD PRESSURE: 83 MMHG

## 2020-03-24 DIAGNOSIS — S01.81XA LACERATION WITHOUT FOREIGN BODY OF OTHER PART OF HEAD, INITIAL ENCOUNTER: ICD-10-CM
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
## 2020-03-24 DIAGNOSIS — S09.90XA UNSPECIFIED INJURY OF HEAD, INITIAL ENCOUNTER: ICD-10-CM
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
## 2020-03-24 DIAGNOSIS — S01.112A LACERATION WITHOUT FOREIGN BODY OF LEFT EYELID AND PERIOCULAR AREA, INITIAL ENCOUNTER: ICD-10-CM

## 2020-03-24 DIAGNOSIS — H20.00 UNSPECIFIED ACUTE AND SUBACUTE IRIDOCYCLITIS: ICD-10-CM

## 2020-03-24 LAB
ALBUMIN SERPL ELPH-MCNC: 4.5 G/DL — SIGNIFICANT CHANGE UP (ref 3.5–5.2)
ALP SERPL-CCNC: 53 U/L — SIGNIFICANT CHANGE UP (ref 30–115)
ALT FLD-CCNC: 16 U/L — SIGNIFICANT CHANGE UP (ref 0–41)
ANION GAP SERPL CALC-SCNC: 13 MMOL/L — SIGNIFICANT CHANGE UP (ref 7–14)
AST SERPL-CCNC: 16 U/L — SIGNIFICANT CHANGE UP (ref 0–41)
BASOPHILS # BLD AUTO: 0.03 K/UL — SIGNIFICANT CHANGE UP (ref 0–0.2)
BASOPHILS NFR BLD AUTO: 0.5 % — SIGNIFICANT CHANGE UP (ref 0–1)
BILIRUB SERPL-MCNC: 0.3 MG/DL — SIGNIFICANT CHANGE UP (ref 0.2–1.2)
BUN SERPL-MCNC: 28 MG/DL — HIGH (ref 10–20)
CALCIUM SERPL-MCNC: 9.8 MG/DL — SIGNIFICANT CHANGE UP (ref 8.5–10.1)
CHLORIDE SERPL-SCNC: 104 MMOL/L — SIGNIFICANT CHANGE UP (ref 98–110)
CO2 SERPL-SCNC: 25 MMOL/L — SIGNIFICANT CHANGE UP (ref 17–32)
CREAT SERPL-MCNC: 1.6 MG/DL — HIGH (ref 0.7–1.5)
EOSINOPHIL # BLD AUTO: 0.24 K/UL — SIGNIFICANT CHANGE UP (ref 0–0.7)
EOSINOPHIL NFR BLD AUTO: 4.3 % — SIGNIFICANT CHANGE UP (ref 0–8)
GLUCOSE SERPL-MCNC: 113 MG/DL — HIGH (ref 70–99)
HCT VFR BLD CALC: 40.5 % — LOW (ref 42–52)
HGB BLD-MCNC: 12.8 G/DL — LOW (ref 14–18)
IMM GRANULOCYTES NFR BLD AUTO: 0.4 % — HIGH (ref 0.1–0.3)
LYMPHOCYTES # BLD AUTO: 1.7 K/UL — SIGNIFICANT CHANGE UP (ref 1.2–3.4)
LYMPHOCYTES # BLD AUTO: 30.4 % — SIGNIFICANT CHANGE UP (ref 20.5–51.1)
MCHC RBC-ENTMCNC: 26.9 PG — LOW (ref 27–31)
MCHC RBC-ENTMCNC: 31.6 G/DL — LOW (ref 32–37)
MCV RBC AUTO: 85.3 FL — SIGNIFICANT CHANGE UP (ref 80–94)
MONOCYTES # BLD AUTO: 0.62 K/UL — HIGH (ref 0.1–0.6)
MONOCYTES NFR BLD AUTO: 11.1 % — HIGH (ref 1.7–9.3)
NEUTROPHILS # BLD AUTO: 2.98 K/UL — SIGNIFICANT CHANGE UP (ref 1.4–6.5)
NEUTROPHILS NFR BLD AUTO: 53.3 % — SIGNIFICANT CHANGE UP (ref 42.2–75.2)
NRBC # BLD: 0 /100 WBCS — SIGNIFICANT CHANGE UP (ref 0–0)
PLATELET # BLD AUTO: 185 K/UL — SIGNIFICANT CHANGE UP (ref 130–400)
POTASSIUM SERPL-MCNC: 4.2 MMOL/L — SIGNIFICANT CHANGE UP (ref 3.5–5)
POTASSIUM SERPL-SCNC: 4.2 MMOL/L — SIGNIFICANT CHANGE UP (ref 3.5–5)
PROT SERPL-MCNC: 6.8 G/DL — SIGNIFICANT CHANGE UP (ref 6–8)
RBC # BLD: 4.75 M/UL — SIGNIFICANT CHANGE UP (ref 4.7–6.1)
RBC # FLD: 13.6 % — SIGNIFICANT CHANGE UP (ref 11.5–14.5)
SODIUM SERPL-SCNC: 142 MMOL/L — SIGNIFICANT CHANGE UP (ref 135–146)
WBC # BLD: 5.59 K/UL — SIGNIFICANT CHANGE UP (ref 4.8–10.8)
WBC # FLD AUTO: 5.59 K/UL — SIGNIFICANT CHANGE UP (ref 4.8–10.8)

## 2020-03-24 PROCEDURE — 99285 EMERGENCY DEPT VISIT HI MDM: CPT | Mod: 25

## 2020-03-24 PROCEDURE — 12013 RPR F/E/E/N/L/M 2.6-5.0 CM: CPT

## 2020-03-24 PROCEDURE — 70450 CT HEAD/BRAIN W/O DYE: CPT | Mod: 26

## 2020-03-24 PROCEDURE — 70486 CT MAXILLOFACIAL W/O DYE: CPT | Mod: 26

## 2020-03-24 RX ORDER — TETANUS TOXOID, REDUCED DIPHTHERIA TOXOID AND ACELLULAR PERTUSSIS VACCINE, ADSORBED 5; 2.5; 8; 8; 2.5 [IU]/.5ML; [IU]/.5ML; UG/.5ML; UG/.5ML; UG/.5ML
0.5 SUSPENSION INTRAMUSCULAR ONCE
Refills: 0 | Status: COMPLETED | OUTPATIENT
Start: 2020-03-24 | End: 2020-03-24
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
## 2020-03-24 NOTE — ED PROVIDER NOTE - PATIENT PORTAL LINK FT
You can access the FollowMyHealth Patient Portal offered by [facility] by registering at the following website: [email or web address]. By joining [facility]’s FollowMyHealth portal, you will also be able to view your health information using other applications (apps) compatible with our system.

## 2020-03-24 NOTE — ED PROVIDER NOTE - NSFOLLOWUPINSTRUCTIONS_ED_ALL_ED_FT
Closed Head Injury    A closed head injury is an injury to your head that may or may not involve a traumatic brain injury (TBI). Symptoms of TBI can be short or long lasting and include headache, dizziness, interference with memory or speech, fatigue, confusion, changes in sleep, mood changes, nausea, depression/anxiety, and dulling of senses. Make sure to obtain proper rest which includes getting plenty of sleep, avoiding excessive visual stimulation, and avoiding activities that may cause physical or mental stress. Avoid any situation where there is potential for another head injury, including sports.    SEEK IMMEDIATE MEDICAL CARE IF YOU HAVE ANY OF THE FOLLOWING SYMPTOMS: unusual drowsiness, vomiting, severe dizziness, seizures, lightheadedness, muscular weakness, different pupil sizes, visual changes, or clear or bloody discharge from your ears or nose.     Laceration    A laceration is a cut that goes through all of the layers of the skin and into the tissue that is right under the skin. Some lacerations heal on their own. Others need to be closed with skin adhesive strips, skin glue, stitches (sutures), or staples. Proper laceration care minimizes the risk of infection and helps the laceration to heal better.  If non-absorbable stitches or staples have been placed, they must be taken out within the time frame instructed by your healthcare provider.    SEEK IMMEDIATE MEDICAL CARE IF YOU HAVE ANY OF THE FOLLOWING SYMPTOMS: swelling around the wound, worsening pain, drainage from the wound, red streaking going away from your wound, inability to move finger or toe near the laceration, or discoloration of skin near the laceration.
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
## 2020-03-24 NOTE — ED PROVIDER NOTE - NSFOLLOWUPCLINICS_GEN_ALL_ED_FT
[facility]  Ophthalmolgy  [address], [address]  [place] [address]  Phone: [phone]  Fax:   Follow Up Time: 1-3 Days

## 2020-03-24 NOTE — ED PROVIDER NOTE - PHYSICAL EXAMINATION
Physical Exam    Vital Signs: I have reviewed the initial vital signs.  Constitutional: well-nourished, appears stated age, no acute distress  Eyes: Conjunctiva pink, Sclera clear, PERRLA, EOMI.  Cardiovascular: S1 and S2, regular rate, regular rhythm, well-perfused extremities, radial pulses equal and 2+  Respiratory: unlabored respiratory effort, clear to auscultation bilaterally no wheezing, rales and rhonchi  Gastrointestinal: soft, non-tender abdomen, no pulsatile mass, normal bowl sounds  Musculoskeletal: supple neck, no lower extremity edema, no midline tenderness, ttp over left orbital rim and left mandible, no c spine ttp.   Integumentary: warm, dry, no rash, two lac to left sided of face 2cm and 3cm.   Neurologic: awake, alert, cranial nerves II-XII grossly intact, extremities’ motor and sensory functions grossly intact Physical Exam    Vital Signs: I have reviewed the initial vital signs.  Constitutional: well-nourished, appears stated age, no acute distress  Eyes: Conjunctiva pink, Sclera clear, PERRLA, EOMI, left cornea abrasion, VA Right 20/30, Left eye 20/50, Left eye pressure 22, right eye pressure 15.   Cardiovascular: S1 and S2, regular rate, regular rhythm, well-perfused extremities, radial pulses equal and 2+  Respiratory: unlabored respiratory effort, clear to auscultation bilaterally no wheezing, rales and rhonchi  Gastrointestinal: soft, non-tender abdomen, no pulsatile mass, normal bowl sounds  Musculoskeletal: supple neck, no lower extremity edema, no midline tenderness, ttp over left orbital rim and left mandible, no c spine ttp.   Integumentary: warm, dry, no rash, two lac to left sided of face 2cm and 3cm.   Neurologic: awake, alert, cranial nerves II-XII grossly intact, extremities’ motor and sensory functions grossly intact

## 2020-03-25 RX ORDER — OFLOXACIN 0.3 %
1 DROPS OPHTHALMIC (EYE)
Qty: 1 | Refills: 0
Start: 2020-03-25 | End: 2020-04-07

## 2020-03-25 NOTE — ED PROCEDURE NOTE - CPROC ED POST PROC CARE GUIDE1
Instructed patient/caregiver to follow-up with primary care physician./Verbal/written post procedure instructions were given to patient/caregiver./Instructed patient/caregiver regarding signs and symptoms of infection./Keep the cast/splint/dressing clean and dry.
Verbal/written post procedure instructions were given to patient/caregiver./Keep the cast/splint/dressing clean and dry./Instructed patient/caregiver to follow-up with primary care physician./Instructed patient/caregiver regarding signs and symptoms of infection.

## 2020-04-22 ENCOUNTER — APPOINTMENT (OUTPATIENT)
Dept: CARDIOLOGY | Facility: CLINIC | Age: 80
End: 2020-04-22
Payer: MEDICARE

## 2020-04-22 PROCEDURE — 93296 REM INTERROG EVL PM/IDS: CPT

## 2020-04-22 PROCEDURE — 93295 DEV INTERROG REMOTE 1/2/MLT: CPT

## 2020-07-22 ENCOUNTER — APPOINTMENT (OUTPATIENT)
Dept: CARDIOLOGY | Facility: CLINIC | Age: 80
End: 2020-07-22
Payer: MEDICARE

## 2020-07-22 PROCEDURE — 93295 DEV INTERROG REMOTE 1/2/MLT: CPT

## 2020-07-22 PROCEDURE — 93296 REM INTERROG EVL PM/IDS: CPT

## 2020-07-31 ENCOUNTER — APPOINTMENT (OUTPATIENT)
Dept: CARDIOLOGY | Facility: CLINIC | Age: 80
End: 2020-07-31
Payer: MEDICARE

## 2020-07-31 VITALS
TEMPERATURE: 98.1 F | SYSTOLIC BLOOD PRESSURE: 118 MMHG | WEIGHT: 152 LBS | HEIGHT: 66 IN | DIASTOLIC BLOOD PRESSURE: 78 MMHG | BODY MASS INDEX: 24.43 KG/M2

## 2020-07-31 PROCEDURE — 99213 OFFICE O/P EST LOW 20 MIN: CPT

## 2020-07-31 PROCEDURE — 93284 PRGRMG EVAL IMPLANTABLE DFB: CPT

## 2020-07-31 PROCEDURE — 93290 INTERROG DEV EVAL ICPMS IP: CPT

## 2020-07-31 NOTE — HISTORY OF PRESENT ILLNESS
[None] : The patient complains of no symptoms [Palpitations] : no palpitations [SOB] : no dyspnea [Erythema at Site] : no erythema at device site [Swelling at Site] : no swelling at device site [de-identified] : \par 79  years old male with pmh of CAD, MI S/P PTCA with stent, ICM/ HF -NYHA II ,  S/P CRT- D , PAF, GI bleed on Eliquis . He underwent a Watchman procedure on 7/6/2017 , S/P HILARIA , Stopped Warfarin 8/24/2017\par Denies CP/SOB/palpitations \par NO dizziness or syncope \par Denies AICD shocks \par No LE edema\par

## 2020-07-31 NOTE — ASSESSMENT
[FreeTextEntry1] : \par \par AICD interrogation today: Normal CRT-D function . PAF . Pt is asymptomatic . Rate controlled. \par 1 episode of NSVT , no shocks . Thoracic impedance - stable . All parameters stable \par AFIB burden <0.1%\par Currently on  mg \par \par Plan \par -Continue  mg daily \par -Continue remote monitoring\par -F/U in 6 months

## 2020-07-31 NOTE — PROCEDURE
[No] : not [NSR] : normal sinus rhythm [CRT-D] : Cardiac resynchronization therapy defibrillator [DDDR] : DDDR [Voltage: ___ volts] : Voltage was [unfilled] volts [Charge Time: ___ sec] : charge time was [unfilled] seconds [Longevity: ___ months] : The estimated remaining battery life is [unfilled] months [Threshold Testing Performed] : Threshold testing was performed [Programmed for Longevity] : output reprogrammed for improved battery longevity [Asense-Vsense ___ %] : Asense-Vsense [unfilled]% [Asense-Vpace ___ %] : Asense-Vpace [unfilled]% [Apace-Vsense ___ %] : Apace-Vsense [unfilled]% [Apace-Vpace ___ %] : Apace-Vpace [unfilled]% [de-identified] : Medtronic [de-identified] : Amplia MRI Quad [de-identified] : [id number] [de-identified] : 6/30/16 [de-identified] : 60

## 2020-07-31 NOTE — REVIEW OF SYSTEMS
[Easy Bruising] : a tendency for easy bruising [Fever] : no fever [Blurry Vision] : no blurred vision [Shortness Of Breath] : no shortness of breath [Chest Pain] : no chest pain [Lower Ext Edema] : no extremity edema [Palpitations] : no palpitations [Cough] : no cough [Abdominal Pain] : no abdominal pain [Urinary Frequency] : no change in urinary frequency [Nocturia] : no nocturia [Joint Pain] : no joint pain [Skin: A Rash] : no rash: [Dizziness] : no dizziness [Confusion] : no confusion was observed [Anxiety] : no anxiety [Excessive Thirst] : no polydipsia [Easy Bleeding] : no tendency for easy bleeding

## 2020-08-03 ENCOUNTER — APPOINTMENT (OUTPATIENT)
Dept: UROLOGY | Facility: CLINIC | Age: 80
End: 2020-08-03

## 2020-08-31 ENCOUNTER — APPOINTMENT (OUTPATIENT)
Dept: UROLOGY | Facility: CLINIC | Age: 80
End: 2020-08-31
Payer: MEDICARE

## 2020-08-31 ENCOUNTER — LABORATORY RESULT (OUTPATIENT)
Age: 80
End: 2020-08-31

## 2020-08-31 DIAGNOSIS — R35.1 NOCTURIA: ICD-10-CM

## 2020-08-31 PROCEDURE — 99214 OFFICE O/P EST MOD 30 MIN: CPT

## 2020-08-31 NOTE — HISTORY OF PRESENT ILLNESS
[FreeTextEntry1] : JANETTE SAUL is a 79 year old male who presents with BPH and intermittent urinary retention now on flomax and finasteride.\par \par Now reports he is voiding well. Good force of stream. Nocturia 2x. Denies gross hematuria, dysuria or associated symptoms. Denies pelvic pain.\par Last visit we started the finasteride as patient had incomplete bladder emptying on renal bladder ultrasound\par \par UA w microheme and pyuria 10/2019\par NO UCx\par \par Renal and bladder ultrasound images visualized from 9/2019 shows post void residual 147 g with BPH 71 g.  And moderate intravesical protrusion\par Left renal cyst.\par \par Prev Urinalysis negative for microscopic hematuria\par \par Denies  PMH including kidney stones, recurrent UTIs. \par Family History: unknown as pt adopted\par \par Old records reviewed\par Cr 1.9 similar to prior. States had prior PSA which were normal.\par Prior bladder sono 2016 78g prostate and pt unable to void 450 ml volume\par

## 2020-08-31 NOTE — ASSESSMENT
[FreeTextEntry1] : JANETTE SAUL is a 79 year old male who presents with BPH and intermittent urinary retention now on flomax and finasteride.\par Microheme however may have been contaminated specimen\par will book cysto and repeat urine , cancel cysto if neg

## 2020-09-02 LAB — BACTERIA UR CULT: NORMAL

## 2020-10-13 ENCOUNTER — APPOINTMENT (OUTPATIENT)
Dept: UROLOGY | Facility: CLINIC | Age: 80
End: 2020-10-13

## 2020-10-21 ENCOUNTER — APPOINTMENT (OUTPATIENT)
Dept: CARDIOLOGY | Facility: CLINIC | Age: 80
End: 2020-10-21
Payer: MEDICARE

## 2020-10-21 PROCEDURE — 93295 DEV INTERROG REMOTE 1/2/MLT: CPT

## 2020-10-21 PROCEDURE — 93296 REM INTERROG EVL PM/IDS: CPT

## 2021-01-20 ENCOUNTER — APPOINTMENT (OUTPATIENT)
Dept: CARDIOLOGY | Facility: CLINIC | Age: 81
End: 2021-01-20
Payer: MEDICARE

## 2021-01-20 PROCEDURE — 93295 DEV INTERROG REMOTE 1/2/MLT: CPT

## 2021-01-20 PROCEDURE — 93296 REM INTERROG EVL PM/IDS: CPT

## 2021-06-04 ENCOUNTER — EMERGENCY (EMERGENCY)
Facility: HOSPITAL | Age: 81
LOS: 0 days | Discharge: HOME | End: 2021-06-04
Attending: EMERGENCY MEDICINE | Admitting: EMERGENCY MEDICINE
Payer: COMMERCIAL

## 2021-06-04 VITALS
SYSTOLIC BLOOD PRESSURE: 173 MMHG | WEIGHT: 169.98 LBS | RESPIRATION RATE: 20 BRPM | HEART RATE: 70 BPM | DIASTOLIC BLOOD PRESSURE: 70 MMHG | OXYGEN SATURATION: 98 % | TEMPERATURE: 98 F

## 2021-06-04 DIAGNOSIS — Z04.1 ENCOUNTER FOR EXAMINATION AND OBSERVATION FOLLOWING TRANSPORT ACCIDENT: ICD-10-CM
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
## 2021-06-04 DIAGNOSIS — S51.811A LACERATION WITHOUT FOREIGN BODY OF RIGHT FOREARM, INITIAL ENCOUNTER: ICD-10-CM
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
## 2021-06-04 DIAGNOSIS — S41.011A LACERATION WITHOUT FOREIGN BODY OF RIGHT SHOULDER, INITIAL ENCOUNTER: ICD-10-CM

## 2021-06-04 DIAGNOSIS — R51.9 HEADACHE, UNSPECIFIED: ICD-10-CM

## 2021-06-04 LAB
ALBUMIN SERPL ELPH-MCNC: 4.7 G/DL — SIGNIFICANT CHANGE UP (ref 3.5–5.2)
ALP SERPL-CCNC: 47 U/L — SIGNIFICANT CHANGE UP (ref 30–115)
ALT FLD-CCNC: 23 U/L — SIGNIFICANT CHANGE UP (ref 0–41)
ANION GAP SERPL CALC-SCNC: 12 MMOL/L — SIGNIFICANT CHANGE UP (ref 7–14)
APTT BLD: 31.1 SEC — SIGNIFICANT CHANGE UP (ref 27–39.2)
AST SERPL-CCNC: 59 U/L — HIGH (ref 0–41)
BASOPHILS # BLD AUTO: 0.03 K/UL — SIGNIFICANT CHANGE UP (ref 0–0.2)
BASOPHILS NFR BLD AUTO: 0.4 % — SIGNIFICANT CHANGE UP (ref 0–1)
BILIRUB SERPL-MCNC: 0.4 MG/DL — SIGNIFICANT CHANGE UP (ref 0.2–1.2)
BUN SERPL-MCNC: 27 MG/DL — HIGH (ref 10–20)
CALCIUM SERPL-MCNC: 9.7 MG/DL — SIGNIFICANT CHANGE UP (ref 8.5–10.1)
CHLORIDE SERPL-SCNC: 105 MMOL/L — SIGNIFICANT CHANGE UP (ref 98–110)
CO2 SERPL-SCNC: 22 MMOL/L — SIGNIFICANT CHANGE UP (ref 17–32)
CREAT SERPL-MCNC: 1.8 MG/DL — HIGH (ref 0.7–1.5)
EOSINOPHIL # BLD AUTO: 0.23 K/UL — SIGNIFICANT CHANGE UP (ref 0–0.7)
EOSINOPHIL NFR BLD AUTO: 3.3 % — SIGNIFICANT CHANGE UP (ref 0–8)
ETHANOL SERPL-MCNC: <10 MG/DL — SIGNIFICANT CHANGE UP
GLUCOSE SERPL-MCNC: 116 MG/DL — HIGH (ref 70–99)
HCT VFR BLD CALC: 42.3 % — SIGNIFICANT CHANGE UP (ref 42–52)
HGB BLD-MCNC: 13.4 G/DL — LOW (ref 14–18)
IMM GRANULOCYTES NFR BLD AUTO: 0.3 % — SIGNIFICANT CHANGE UP (ref 0.1–0.3)
INR BLD: 0.94 RATIO — SIGNIFICANT CHANGE UP (ref 0.65–1.3)
LACTATE SERPL-SCNC: 2 MMOL/L — SIGNIFICANT CHANGE UP (ref 0.7–2)
LIDOCAIN IGE QN: 32 U/L — SIGNIFICANT CHANGE UP (ref 7–60)
LYMPHOCYTES # BLD AUTO: 2.09 K/UL — SIGNIFICANT CHANGE UP (ref 1.2–3.4)
LYMPHOCYTES # BLD AUTO: 29.9 % — SIGNIFICANT CHANGE UP (ref 20.5–51.1)
MCHC RBC-ENTMCNC: 26.8 PG — LOW (ref 27–31)
MCHC RBC-ENTMCNC: 31.7 G/DL — LOW (ref 32–37)
MCV RBC AUTO: 84.6 FL — SIGNIFICANT CHANGE UP (ref 80–94)
MONOCYTES # BLD AUTO: 0.6 K/UL — SIGNIFICANT CHANGE UP (ref 0.1–0.6)
MONOCYTES NFR BLD AUTO: 8.6 % — SIGNIFICANT CHANGE UP (ref 1.7–9.3)
NEUTROPHILS # BLD AUTO: 4.02 K/UL — SIGNIFICANT CHANGE UP (ref 1.4–6.5)
NEUTROPHILS NFR BLD AUTO: 57.5 % — SIGNIFICANT CHANGE UP (ref 42.2–75.2)
NRBC # BLD: 0 /100 WBCS — SIGNIFICANT CHANGE UP (ref 0–0)
PLATELET # BLD AUTO: 200 K/UL — SIGNIFICANT CHANGE UP (ref 130–400)
POTASSIUM SERPL-MCNC: 5.9 MMOL/L — HIGH (ref 3.5–5)
POTASSIUM SERPL-SCNC: 5.9 MMOL/L — HIGH (ref 3.5–5)
PROT SERPL-MCNC: 7.1 G/DL — SIGNIFICANT CHANGE UP (ref 6–8)
PROTHROM AB SERPL-ACNC: 10.8 SEC — SIGNIFICANT CHANGE UP (ref 9.95–12.87)
RBC # BLD: 5 M/UL — SIGNIFICANT CHANGE UP (ref 4.7–6.1)
RBC # FLD: 13.6 % — SIGNIFICANT CHANGE UP (ref 11.5–14.5)
SODIUM SERPL-SCNC: 139 MMOL/L — SIGNIFICANT CHANGE UP (ref 135–146)
WBC # BLD: 6.99 K/UL — SIGNIFICANT CHANGE UP (ref 4.8–10.8)
WBC # FLD AUTO: 6.99 K/UL — SIGNIFICANT CHANGE UP (ref 4.8–10.8)

## 2021-06-04 PROCEDURE — 72125 CT NECK SPINE W/O DYE: CPT | Mod: 26,MA

## 2021-06-04 PROCEDURE — 71045 X-RAY EXAM CHEST 1 VIEW: CPT | Mod: 26

## 2021-06-04 PROCEDURE — 73060 X-RAY EXAM OF HUMERUS: CPT | Mod: 26,RT

## 2021-06-04 PROCEDURE — 73030 X-RAY EXAM OF SHOULDER: CPT | Mod: 26,RT

## 2021-06-04 PROCEDURE — 70450 CT HEAD/BRAIN W/O DYE: CPT | Mod: 26,MA

## 2021-06-04 PROCEDURE — 72170 X-RAY EXAM OF PELVIS: CPT | Mod: 26

## 2021-06-04 PROCEDURE — 99284 EMERGENCY DEPT VISIT MOD MDM: CPT

## 2021-06-04 RX ORDER — KETOROLAC TROMETHAMINE 30 MG/ML
15 SYRINGE (ML) INJECTION ONCE
Refills: 0 | Status: DISCONTINUED | OUTPATIENT
Start: 2021-06-04 | End: 2021-06-04

## 2021-06-04 RX ORDER — SODIUM CHLORIDE 9 MG/ML
1000 INJECTION INTRAMUSCULAR; INTRAVENOUS; SUBCUTANEOUS ONCE
Refills: 0 | Status: COMPLETED | OUTPATIENT
Start: 2021-06-04 | End: 2021-06-04

## 2021-06-04 RX ADMIN — Medication 15 MILLIGRAM(S): at 18:42

## 2021-06-04 RX ADMIN — SODIUM CHLORIDE 3000 MILLILITER(S): 9 INJECTION INTRAMUSCULAR; INTRAVENOUS; SUBCUTANEOUS at 18:12

## 2021-06-04 RX ADMIN — Medication 15 MILLIGRAM(S): at 18:12

## 2021-06-04 NOTE — ED PROVIDER NOTE - PHYSICAL EXAMINATION
Primary survey intact. On secondary survey, skin tear to L forearm, and R bicep with pain, no point tenderness.   VITAL SIGNS: I have reviewed nursing notes and confirm.  CONSTITUTIONAL: non-toxic, well appearing, + airway intact, GCS 15  SKIN: no rash, no petechiae. no ecchymosis  EYES: PERRL, EOMI,  ENT: no hemotympanum, tongue midline,  NECK: Supple; no cervical-thoracic-lumbar spine tenderness  CARD: RRR, equal radial pulses bilaterally 2+  RESP: CTAB, no respiratory distress, no crepitus over chest wall  ABD: Soft, non-tender, non-distended  PELVIS: stable  EXT: Normal ROM x4. no bony tenderness, equal strength bilaterally  NEURO: Alert, oriented. CN2-12 intact, equal strength bilaterally, nl gait.  PSYCH: Cooperative, appropriate.
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
## 2021-06-04 NOTE — ED PROVIDER NOTE - PATIENT PORTAL LINK FT
You can access the FollowMyHealth Patient Portal offered by [facility] by registering at the following website: [email or web address]. By joining [employer]’s FollowMyHealth portal, you will also be able to view your health information using other applications (apps) compatible with our system.

## 2021-06-04 NOTE — ED ADULT NURSE NOTE - NSIMPLEMENTINTERV_GEN_ALL_ED
Implemented All Fall with Harm Risk Interventions:  [place] to call system. Call bell, personal items and telephone within reach. Instruct patient to call for assistance. Room bathroom lighting operational. Non-slip footwear when patient is off stretcher. Physically safe environment: no spills, clutter or unnecessary equipment. Stretcher in lowest position, wheels locked, appropriate side rails in place. Provide visual cue, wrist band, yellow gown, etc. Monitor gait and stability. Monitor for mental status changes and reorient to person, place, and time. Review medications for side effects contributing to fall risk. Reinforce activity limits and safety measures with patient and family. Provide visual clues: red socks.
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
## 2021-06-04 NOTE — ED PROVIDER NOTE - NS ED ROS FT
Constitutional: See HPI. No fever/chills.  Eyes: No visual changes, eye pain or discharge.  ENT: No hearing changes, pain, discharge or infections.  Neck: No neck pain or stiffness.  Cardiac: No chest pain, SOB or edema. No chest pain with exertion.  Respiratory: No cough or respiratory distress. No hemoptysis.   GI: No nausea, vomiting, diarrhea or abdominal pain.  : No dysuria, frequency or burning.   MS: No myalgia, muscle weakness, joint pain or back pain. Pain to head, L arm, R bicep.  Neuro: No headache or weakness. No LOC.  Skin: No rash.  Except as documented in the HPI, all other systems are negative.
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
## 2021-06-25 ENCOUNTER — APPOINTMENT (OUTPATIENT)
Dept: CARDIOLOGY | Facility: CLINIC | Age: 81
End: 2021-06-25
Payer: MEDICARE

## 2021-06-25 VITALS
SYSTOLIC BLOOD PRESSURE: 120 MMHG | TEMPERATURE: 97.8 F | HEART RATE: 66 BPM | BODY MASS INDEX: 24.91 KG/M2 | OXYGEN SATURATION: 96 % | RESPIRATION RATE: 18 BRPM | DIASTOLIC BLOOD PRESSURE: 68 MMHG | HEIGHT: 66 IN | WEIGHT: 155 LBS

## 2021-06-25 PROCEDURE — 93284 PRGRMG EVAL IMPLANTABLE DFB: CPT

## 2021-06-25 PROCEDURE — 93290 INTERROG DEV EVAL ICPMS IP: CPT

## 2021-06-25 RX ORDER — OFLOXACIN 3 MG/ML
0.3 SOLUTION/ DROPS OPHTHALMIC
Qty: 5 | Refills: 0 | Status: DISCONTINUED | COMMUNITY
Start: 2020-03-25 | End: 2021-06-25

## 2021-06-25 NOTE — HISTORY OF PRESENT ILLNESS
[None] : The patient complains of no symptoms [Palpitations] : no palpitations [SOB] : no dyspnea [Erythema at Site] : no erythema at device site [Swelling at Site] : no swelling at device site [de-identified] : \par 80  years old male with pmh of CAD, MI S/P PTCA with stent, ICM/ HF -NYHA II ,  S/P CRT- D , PAF, GI bleed on Eliquis . He underwent a Watchman procedure on 7/6/2017 , S/P HILARIA , Stopped Warfarin 8/24/2017\par Denies CP/SOB/palpitations \par NO dizziness or syncope \par Denies AICD shocks \par No LE edema\par Good activity and exercise tolerance, jogs on a treadmill 7 days

## 2021-06-25 NOTE — PROCEDURE
[No] : not [NSR] : normal sinus rhythm [See Scanned Paceart Report] : See scanned paceart report [See Device Printout] : See device printout [CRT-D] : Cardiac resynchronization therapy defibrillator [DDDR] : DDDR [Charge Time: ___ sec] : charge time was [unfilled] seconds [Threshold Testing Performed] : Threshold testing was performed [Programmed for Longevity] : output reprogrammed for improved battery longevity [Asense-Vsense ___ %] : Asense-Vsense [unfilled]% [Asense-Vpace ___ %] : Asense-Vpace [unfilled]% [Apace-Vsense ___ %] : Apace-Vsense [unfilled]% [Apace-Vpace ___ %] : Apace-Vpace [unfilled]% [de-identified] : Medtronic [de-identified] : [id number] [de-identified] : Amplia MRI Quad [de-identified] : 6/30/16 [de-identified] : 60 [de-identified] : 3 AT/AF Episodes longest lasting 2 hours and 40 minutes on 9/30/19.

## 2021-07-30 ENCOUNTER — NON-APPOINTMENT (OUTPATIENT)
Age: 81
End: 2021-07-30

## 2021-07-30 ENCOUNTER — APPOINTMENT (OUTPATIENT)
Dept: CARDIOLOGY | Facility: CLINIC | Age: 81
End: 2021-07-30
Payer: MEDICARE

## 2021-07-30 PROCEDURE — 93296 REM INTERROG EVL PM/IDS: CPT

## 2021-07-30 PROCEDURE — 93295 DEV INTERROG REMOTE 1/2/MLT: CPT

## 2021-08-12 ENCOUNTER — RX RENEWAL (OUTPATIENT)
Age: 81
End: 2021-08-12

## 2021-10-01 ENCOUNTER — RX RENEWAL (OUTPATIENT)
Age: 81
End: 2021-10-01

## 2021-10-29 ENCOUNTER — APPOINTMENT (OUTPATIENT)
Dept: CARDIOLOGY | Facility: CLINIC | Age: 81
End: 2021-10-29
Payer: MEDICARE

## 2021-10-29 ENCOUNTER — NON-APPOINTMENT (OUTPATIENT)
Age: 81
End: 2021-10-29

## 2021-10-29 PROCEDURE — 93295 DEV INTERROG REMOTE 1/2/MLT: CPT | Mod: NC

## 2021-10-29 PROCEDURE — 93296 REM INTERROG EVL PM/IDS: CPT

## 2021-11-18 ENCOUNTER — APPOINTMENT (OUTPATIENT)
Dept: UROLOGY | Facility: CLINIC | Age: 81
End: 2021-11-18
Payer: MEDICARE

## 2021-11-18 PROCEDURE — 99214 OFFICE O/P EST MOD 30 MIN: CPT

## 2021-11-30 ENCOUNTER — EMERGENCY (EMERGENCY)
Facility: HOSPITAL | Age: 81
LOS: 0 days | Discharge: HOME | End: 2021-11-30
Attending: EMERGENCY MEDICINE | Admitting: EMERGENCY MEDICINE
Payer: MEDICARE

## 2021-11-30 VITALS
SYSTOLIC BLOOD PRESSURE: 157 MMHG | HEART RATE: 79 BPM | DIASTOLIC BLOOD PRESSURE: 72 MMHG | RESPIRATION RATE: 18 BRPM | OXYGEN SATURATION: 98 % | TEMPERATURE: 99 F

## 2021-11-30 VITALS
RESPIRATION RATE: 20 BRPM | OXYGEN SATURATION: 98 % | SYSTOLIC BLOOD PRESSURE: 145 MMHG | TEMPERATURE: 98 F | HEIGHT: 65 IN | WEIGHT: 158.07 LBS | HEART RATE: 74 BPM | DIASTOLIC BLOOD PRESSURE: 76 MMHG

## 2021-11-30 DIAGNOSIS — Z95.0 PRESENCE OF CARDIAC PACEMAKER: ICD-10-CM

## 2021-11-30 DIAGNOSIS — R10.9 UNSPECIFIED ABDOMINAL PAIN: ICD-10-CM

## 2021-11-30 DIAGNOSIS — R11.2 NAUSEA WITH VOMITING, UNSPECIFIED: ICD-10-CM

## 2021-11-30 DIAGNOSIS — N23 UNSPECIFIED RENAL COLIC: ICD-10-CM

## 2021-11-30 DIAGNOSIS — Z87.891 PERSONAL HISTORY OF NICOTINE DEPENDENCE: ICD-10-CM

## 2021-11-30 DIAGNOSIS — I12.9 HYPERTENSIVE CHRONIC KIDNEY DISEASE WITH STAGE 1 THROUGH STAGE 4 CHRONIC KIDNEY DISEASE, OR UNSPECIFIED CHRONIC KIDNEY DISEASE: ICD-10-CM

## 2021-11-30 DIAGNOSIS — N18.9 CHRONIC KIDNEY DISEASE, UNSPECIFIED: ICD-10-CM

## 2021-11-30 DIAGNOSIS — N40.0 BENIGN PROSTATIC HYPERPLASIA WITHOUT LOWER URINARY TRACT SYMPTOMS: ICD-10-CM

## 2021-11-30 LAB
ALBUMIN SERPL ELPH-MCNC: 4.9 G/DL — SIGNIFICANT CHANGE UP (ref 3.5–5.2)
ALP SERPL-CCNC: 60 U/L — SIGNIFICANT CHANGE UP (ref 30–115)
ALT FLD-CCNC: 19 U/L — SIGNIFICANT CHANGE UP (ref 0–41)
ANION GAP SERPL CALC-SCNC: 15 MMOL/L — HIGH (ref 7–14)
APPEARANCE UR: CLEAR — SIGNIFICANT CHANGE UP
AST SERPL-CCNC: 22 U/L — SIGNIFICANT CHANGE UP (ref 0–41)
BACTERIA # UR AUTO: NEGATIVE — SIGNIFICANT CHANGE UP
BASOPHILS # BLD AUTO: 0.04 K/UL — SIGNIFICANT CHANGE UP (ref 0–0.2)
BASOPHILS NFR BLD AUTO: 0.4 % — SIGNIFICANT CHANGE UP (ref 0–1)
BILIRUB SERPL-MCNC: 0.5 MG/DL — SIGNIFICANT CHANGE UP (ref 0.2–1.2)
BILIRUB UR-MCNC: NEGATIVE — SIGNIFICANT CHANGE UP
BUN SERPL-MCNC: 38 MG/DL — HIGH (ref 10–20)
CALCIUM SERPL-MCNC: 10 MG/DL — SIGNIFICANT CHANGE UP (ref 8.5–10.1)
CHLORIDE SERPL-SCNC: 105 MMOL/L — SIGNIFICANT CHANGE UP (ref 98–110)
CO2 SERPL-SCNC: 21 MMOL/L — SIGNIFICANT CHANGE UP (ref 17–32)
COLOR SPEC: YELLOW — SIGNIFICANT CHANGE UP
CREAT SERPL-MCNC: 2.2 MG/DL — HIGH (ref 0.7–1.5)
DIFF PNL FLD: ABNORMAL
EOSINOPHIL # BLD AUTO: 0.12 K/UL — SIGNIFICANT CHANGE UP (ref 0–0.7)
EOSINOPHIL NFR BLD AUTO: 1.1 % — SIGNIFICANT CHANGE UP (ref 0–8)
EPI CELLS # UR: 0 /HPF — SIGNIFICANT CHANGE UP (ref 0–5)
GLUCOSE SERPL-MCNC: 147 MG/DL — HIGH (ref 70–99)
GLUCOSE UR QL: NEGATIVE — SIGNIFICANT CHANGE UP
HCT VFR BLD CALC: 43.8 % — SIGNIFICANT CHANGE UP (ref 42–52)
HGB BLD-MCNC: 13.8 G/DL — LOW (ref 14–18)
HYALINE CASTS # UR AUTO: 1 /LPF — SIGNIFICANT CHANGE UP (ref 0–7)
IMM GRANULOCYTES NFR BLD AUTO: 0.3 % — SIGNIFICANT CHANGE UP (ref 0.1–0.3)
KETONES UR-MCNC: NEGATIVE — SIGNIFICANT CHANGE UP
LACTATE SERPL-SCNC: 1.7 MMOL/L — SIGNIFICANT CHANGE UP (ref 0.7–2)
LEUKOCYTE ESTERASE UR-ACNC: NEGATIVE — SIGNIFICANT CHANGE UP
LIDOCAIN IGE QN: 32 U/L — SIGNIFICANT CHANGE UP (ref 7–60)
LYMPHOCYTES # BLD AUTO: 1.41 K/UL — SIGNIFICANT CHANGE UP (ref 1.2–3.4)
LYMPHOCYTES # BLD AUTO: 12.6 % — LOW (ref 20.5–51.1)
MCHC RBC-ENTMCNC: 27.2 PG — SIGNIFICANT CHANGE UP (ref 27–31)
MCHC RBC-ENTMCNC: 31.5 G/DL — LOW (ref 32–37)
MCV RBC AUTO: 86.4 FL — SIGNIFICANT CHANGE UP (ref 80–94)
MONOCYTES # BLD AUTO: 0.65 K/UL — HIGH (ref 0.1–0.6)
MONOCYTES NFR BLD AUTO: 5.8 % — SIGNIFICANT CHANGE UP (ref 1.7–9.3)
NEUTROPHILS # BLD AUTO: 8.96 K/UL — HIGH (ref 1.4–6.5)
NEUTROPHILS NFR BLD AUTO: 79.8 % — HIGH (ref 42.2–75.2)
NITRITE UR-MCNC: NEGATIVE — SIGNIFICANT CHANGE UP
NRBC # BLD: 0 /100 WBCS — SIGNIFICANT CHANGE UP (ref 0–0)
PH UR: 6 — SIGNIFICANT CHANGE UP (ref 5–8)
PLATELET # BLD AUTO: 207 K/UL — SIGNIFICANT CHANGE UP (ref 130–400)
POTASSIUM SERPL-MCNC: 4.7 MMOL/L — SIGNIFICANT CHANGE UP (ref 3.5–5)
POTASSIUM SERPL-SCNC: 4.7 MMOL/L — SIGNIFICANT CHANGE UP (ref 3.5–5)
PROT SERPL-MCNC: 7.5 G/DL — SIGNIFICANT CHANGE UP (ref 6–8)
PROT UR-MCNC: SIGNIFICANT CHANGE UP
RBC # BLD: 5.07 M/UL — SIGNIFICANT CHANGE UP (ref 4.7–6.1)
RBC # FLD: 13.4 % — SIGNIFICANT CHANGE UP (ref 11.5–14.5)
RBC CASTS # UR COMP ASSIST: 107 /HPF — HIGH (ref 0–4)
SODIUM SERPL-SCNC: 141 MMOL/L — SIGNIFICANT CHANGE UP (ref 135–146)
SP GR SPEC: 1.02 — SIGNIFICANT CHANGE UP (ref 1.01–1.03)
UROBILINOGEN FLD QL: SIGNIFICANT CHANGE UP
WBC # BLD: 11.21 K/UL — HIGH (ref 4.8–10.8)
WBC # FLD AUTO: 11.21 K/UL — HIGH (ref 4.8–10.8)
WBC UR QL: 1 /HPF — SIGNIFICANT CHANGE UP (ref 0–5)

## 2021-11-30 PROCEDURE — 74176 CT ABD & PELVIS W/O CONTRAST: CPT | Mod: 26,MA

## 2021-11-30 PROCEDURE — 99284 EMERGENCY DEPT VISIT MOD MDM: CPT | Mod: GC

## 2021-11-30 PROCEDURE — 99284 EMERGENCY DEPT VISIT MOD MDM: CPT

## 2021-11-30 PROCEDURE — 93010 ELECTROCARDIOGRAM REPORT: CPT

## 2021-11-30 RX ORDER — TAMSULOSIN HYDROCHLORIDE 0.4 MG/1
1 CAPSULE ORAL
Qty: 30 | Refills: 0
Start: 2021-11-30 | End: 2021-12-29

## 2021-11-30 RX ORDER — MORPHINE SULFATE 50 MG/1
4 CAPSULE, EXTENDED RELEASE ORAL ONCE
Refills: 0 | Status: DISCONTINUED | OUTPATIENT
Start: 2021-11-30 | End: 2021-11-30

## 2021-11-30 RX ORDER — METOCLOPRAMIDE HCL 10 MG
10 TABLET ORAL ONCE
Refills: 0 | Status: COMPLETED | OUTPATIENT
Start: 2021-11-30 | End: 2021-11-30

## 2021-11-30 RX ORDER — OXYCODONE AND ACETAMINOPHEN 5; 325 MG/1; MG/1
1 TABLET ORAL
Qty: 6 | Refills: 0
Start: 2021-11-30 | End: 2021-12-01

## 2021-11-30 RX ORDER — ONDANSETRON 8 MG/1
4 TABLET, FILM COATED ORAL ONCE
Refills: 0 | Status: COMPLETED | OUTPATIENT
Start: 2021-11-30 | End: 2021-11-30

## 2021-11-30 RX ORDER — FAMOTIDINE 10 MG/ML
20 INJECTION INTRAVENOUS ONCE
Refills: 0 | Status: COMPLETED | OUTPATIENT
Start: 2021-11-30 | End: 2021-11-30

## 2021-11-30 RX ORDER — SODIUM CHLORIDE 9 MG/ML
1000 INJECTION, SOLUTION INTRAVENOUS ONCE
Refills: 0 | Status: COMPLETED | OUTPATIENT
Start: 2021-11-30 | End: 2021-11-30

## 2021-11-30 RX ADMIN — ONDANSETRON 104 MILLIGRAM(S): 8 TABLET, FILM COATED ORAL at 09:22

## 2021-11-30 RX ADMIN — MORPHINE SULFATE 4 MILLIGRAM(S): 50 CAPSULE, EXTENDED RELEASE ORAL at 09:23

## 2021-11-30 RX ADMIN — FAMOTIDINE 20 MILLIGRAM(S): 10 INJECTION INTRAVENOUS at 05:51

## 2021-11-30 RX ADMIN — MORPHINE SULFATE 4 MILLIGRAM(S): 50 CAPSULE, EXTENDED RELEASE ORAL at 05:52

## 2021-11-30 RX ADMIN — SODIUM CHLORIDE 1000 MILLILITER(S): 9 INJECTION, SOLUTION INTRAVENOUS at 05:51

## 2021-11-30 RX ADMIN — ONDANSETRON 4 MILLIGRAM(S): 8 TABLET, FILM COATED ORAL at 05:52

## 2021-11-30 RX ADMIN — Medication 104 MILLIGRAM(S): at 06:26

## 2021-11-30 NOTE — ED ADULT NURSE NOTE - CHIEF COMPLAINT
The patient is a 81y Male complaining of abdominal pain.
Detail Level: Detailed
Quality 130: Documentation Of Current Medications In The Medical Record: Current Medications Documented

## 2021-11-30 NOTE — ED PROVIDER NOTE - ATTENDING CONTRIBUTION TO CARE
I personally evaluated the patient. I reviewed the Resident’s or Physician Assistant’s note (as assigned above), and agree with the findings and plan except as documented in my note.    81y M w/ hx of HTN, PPM, CKD, BPH here with abdominal pain and n/v since last night. states had left over turkey and about 1 hour later started to have NBNB emesis and multiple times. still nauseous. has upper abd pain. no abnormal Bms or melena/brbpr. no cp, sob, fever or cough. no trauma. upper abd pain more on L side, nonradaiting otherwise. on exam, nontoxic, vss   Gen: Alert, NAD  Skin: Warm, dry, intact  Head: NCAT  ENT: Mucous membranes moist  Neck: Supple  CV: RRR, normal S1, S2, no m/r/g; 2+ b/l radials and DPs.   Resp: Non labored respirations, Lungs CTA b/l, no wheezes, rales, rhonchi  Abdomen: Soft, nondistended, mild LUQ ttp, no r/g  no cva ttp  Extremities: Moving all extremities, no edema  Neuro: No focal neuro deficits  Psych: Cooperative     ekg paced and no sgarbossas met.     labs with mild leukocytosis, mild MANUEL from baseline CKD. ddx gastritis vs renal colic vs early gastroenteritis, less likely SBO. less likely AAA or vasuclar etiology based on trigger after eating and nontoxic and vss. clinically with no cp or sob, mild ttp and close to baseline ekg, doubt atypical angina. plan for CTAP, IVFs, antiemetics, pain control and disposition pending w/up and reassessment.

## 2021-11-30 NOTE — CONSULT NOTE ADULT - ASSESSMENT
80 yo male with pmh of CKD, HTN, high cholesterol, PPM (on ASA 325mg), BPH presents to the ED with abdominal pain and nausea that started yesterday evening around 8pm. CT abdomen & pelvis shows mild left perinephric stranding and proximal hydronephroureter extending to the level of an obstructing 5 x 5 x 5 mm left proximal ureteral calculus; and additional 2 mm calculus within the left proximal ureter just proximal to the obstructing stone    Plan:  ·	case discussed with Dr. Botello plan as follows  ·	recommend trial of passage  ·	start flomax   ·	encouraged to drink plenty of fluids    ·	low potassium diet   ·	antiemetics for nausea as needed  ·	pain control with toradol 10mg q 8hrs prn for mod-severe pain   ·	please provide pt with urine strainer to strain urine at home  ·	pt is to follow-up with Dr. Botello on Thursday, office will call for time of the appointment   ·	pt is to return to the ED if develops fever >100.4F, chills, or intractable pain   ·	discussed case with ED

## 2021-11-30 NOTE — ED PROVIDER NOTE - PATIENT PORTAL LINK FT
You can access the FollowMyHealth Patient Portal offered by [facility] by registering at the following website: [email or web address]. By joining [employer]’s FollowMyHealth portal, you will also be able to view your health information using other applications (apps) compatible with our system.

## 2021-11-30 NOTE — ED PROVIDER NOTE - NSFOLLOWUPINSTRUCTIONS_ED_ALL_ED_FT
Please follow up with Urology Dr. Botello this coming Thursday. Take medication as prescribed. Provided urine strainer in ED.     Kidney Stones    Kidney stones (urolithiasis) are deposits that form inside your kidneys. The intense pain is caused by the stone moving through the urinary tract. When the stone moves, the ureter goes into spasm around the stone. The stone is usually passed in the urine. Symptoms include abdominal, side, or back pain, nausea, vomiting, blood in the urine, frequency with urination. Drink enough water and fluids to keep your urine clear or pale yellow. This will help you to pass the stone or stone fragments.    SEEK IMMEDIATE MEDICAL CARE IF YOU HAVE THE FOLLOWING SYMPTOMS: pain not controlled with medication, fever/chills, worsening vomiting, inability to urinate, or dizziness/lightheadedness.

## 2021-11-30 NOTE — ED PROVIDER NOTE - PHYSICAL EXAMINATION
Vital Signs: I have reviewed the initial vital signs.  Constitutional: well-nourished, appears stated age, no acute distress  Cardiovascular: regular rate, regular rhythm, well-perfused extremities  Respiratory: unlabored respiratory effort, clear to auscultation bilaterally  Gastrointestinal: soft, (+) diffusely tender abdomen. No CVA tenderness.   Musculoskeletal: supple neck, no lower extremity edema  Integumentary: warm, dry, no rash  Neurologic: awake, alert, extremities’ motor and sensory functions grossly intact  Psychiatric: appropriate mood, appropriate affect

## 2021-11-30 NOTE — ED PROVIDER NOTE - CARE PROVIDER_API CALL
Aris Botello)  Urology  [address] [place] [address], [address]  [place] [address]  Phone: [phone]  Fax: [phone]  Follow Up Time: 1-3 Days

## 2021-11-30 NOTE — ED PROVIDER NOTE - OBJECTIVE STATEMENT
The pt is an 81y M w/ hx of HTN, PPM, CKD, BPH presenting with abdominal pain, multiple episodes NBNB emesis that started ~20:00 yesterday. States he ate leftover turkey and thinks it gave him food poisoning. Abd pain initially started more in the L flank region, now is diffuse, 10/10 in severity. Afebrile. Denies headache, chest pain, diarrhea, dysuria/hematuria.

## 2021-11-30 NOTE — ED PROVIDER NOTE - PROGRESS NOTE DETAILS
den: signed out to Dr Olson pending CTAP and reassessment. Pt evaluated after sign out. Comfortable. CT reviewed. Urology consulted. Will evaluate pt.

## 2021-11-30 NOTE — ED PROVIDER NOTE - CLINICAL SUMMARY MEDICAL DECISION MAKING FREE TEXT BOX
Pt with kidney stone. Evaluated by urology, will d/c with outpt follow up. Advised to return for worsening of symptoms.

## 2021-11-30 NOTE — ED ADULT TRIAGE NOTE - CHIEF COMPLAINT QUOTE
" I think I have food poisoning after eating left over turkey last night, my stomach hurts a lot and very nauseated."

## 2021-11-30 NOTE — CONSULT NOTE ADULT - SUBJECTIVE AND OBJECTIVE BOX
UROLOGY CONSULT NOTE:    Patient is a 80 yo male with pmh of CKD, HTN, high cholesterol, PPM (on ASA 325mg), BPH presents to the ED with abdominal pain and nausea that started yesterday evening around 8pm. Pt follows with Dr. Botello as out patient for management of his BPH. Pt reports to feeling the urge to vomit but denies any episodes of vomiting. Pt denies fever, chills, flank pain, dysuria, hematuria, or difficulty voiding.        PAST MEDICAL & SURGICAL HISTORY:  CKD    HTN    PPM     high cholesterol     BPH      REVIEW OF SYSTEMS:    [ x ] a 10 point review of systems was negative except where noted        Allergies  No Known Allergies        SOCIAL HISTORY: No illicit drug use      Vital Signs Last 24 Hrs  T(C): 37.3 (2021 07:40), Max: 37.3 (2021 07:40)  T(F): 99.2 (2021 07:40), Max: 99.2 (2021 07:40)  HR: 79 (2021 07:40) (70 - 79)  BP: 157/72 (2021 07:40) (135/70 - 157/72)  RR: 18 (2021 07:40) (16 - 20)  SpO2: 98% (2021 07:40) (98% - 99%)    Daily Height in cm: 165.1 (2021 05:15)      PHYSICAL EXAM  Constitutional: NAD, alert & awake, well-developed  HEENT: NC/AT  Back: No CVA tenderness b/l   Respiratory: No accessory respiratory muscle use  Abd: Soft, +mild tenderness to mid/periumbilical region of the abdomen, nondistended, no suprapubic tenderness elicited upon palpation   : pt voiding freely   Extremities: no edema  Neurological: A&O x 3  Psychiatric: Normal mood, normal affect  Skin: Non diaphoretic       LABS:                        13.8   11.21 )-----------( 207      ( 2021 05:35 )             43.8     11    141  |  105  |  38<H>  ----------------------------<  147<H>  4.7   |  21  |  2.2<H>    Ca    10.0      2021 05:35    TPro  7.5  /  Alb  4.9  /  TBili  0.5  /  DBili  x   /  AST  22  /  ALT  19  /  AlkPhos  60        Urinalysis Basic - ( 2021 08:50 )    Color: Yellow / Appearance: Clear / S.020 / pH: x  Gluc: x / Ketone: Negative  / Bili: Negative / Urobili: <2 mg/dL   Blood: x / Protein: Trace / Nitrite: Negative   Leuk Esterase: Negative / RBC: 107 /HPF / WBC 1 /HPF   Sq Epi: x / Non Sq Epi: 0 /HPF / Bacteria: Negative        RADIOLOGY & ADDITIONAL STUDIES:  < from: CT Abdomen and Pelvis No Cont (21 @ 08:04) >    EXAM:  CT ABDOMEN AND PELVIS            PROCEDURE DATE:  2021            INTERPRETATION:  CLINICAL STATEMENT: Diffuse abdominal pain.    TECHNIQUE: Contiguous axial CT images were obtained from the lower chest to the pubic symphysis without intravenous contrast.  Oral contrast was not administered.  Reformatted images in the coronal and sagittal planes were acquired.    COMPARISON CT: 2005.    FINDINGS:    LOWER CHEST: Mild bibasilar subsegmental atelectasis.    HEPATOBILIARY: Cholelithiasis.    SPLEEN: Unremarkable.    PANCREAS: Unremarkable.    ADRENAL GLANDS: Unremarkable.    KIDNEYS: Mild left perinephric stranding and proximal hydronephroureter extending to the level of an obstructing 5 x 5 x 5 mm left proximal ureteralcalculus (approximately 1080 Hounsfield units) (601/28.) An additional 2 mm calculus is noted within the left proximal ureter just proximal to the obstructing stone. A 4 mm nonobstructing left renal lower pole calculus is noted. Multiple bilateral renal cortical and parapelvic cysts are noted.    ABDOMINOPELVIC NODES: Unremarkable.    PELVIC ORGANS: Enlarged prostate gland with coarse calcifications.    PERITONEUM/MESENTERY/BOWEL: Sigmoid colonic diverticulosis, without evidence of diverticulitis. No evidence of bowel obstruction. No free intraperitoneal air.    BONES/SOFT TISSUES: Degenerative changes of the spine. No acute osseous abnormality.    OTHER: Atherosclerotic vascular calcification.      IMPRESSION:    1. Mild left perinephric stranding and proximal hydronephroureter extending to the level of an obstructing 5 x 5 x 5 mm left proximal ureteral calculus (approximately 1080 Hounsfield units); and additional 2 mm calculus is noted within the left proximal ureter just proximal to the obstructing stone.    2. Nonobstructing 4 mm left renal lower pole calculus.    3. Cholelithiasis.    4. Sigmoid colonic diverticulosis, without evidence of diverticulitis.    5. Enlarged prostate gland with coarse calcifications.    --- End of Report ---        VICKI GALEANA MD; Attending Radiologist  This document has been electronically signed. 2021  8:13AM    < end of copied text >

## 2021-11-30 NOTE — ED PROVIDER NOTE - NS ED ROS FT
Constitutional: (-) fever  Eyes/ENT: (-) blurry vision, (-) epistaxis  Cardiovascular: (-) chest pain, (-) syncope  Respiratory: (-) cough, (-) shortness of breath  Gastrointestinal: (+) vomiting, (+) abdominal pain, (-) diarrhea  Musculoskeletal: (-) neck pain, (-) back pain, (-) joint pain  Integumentary: (-) rash, (-) edema  Neurological: (-) headache, (-) altered mental status  Psychiatric: (-) hallucinations  Allergic/Immunologic: (-) pruritus

## 2021-12-01 ENCOUNTER — NON-APPOINTMENT (OUTPATIENT)
Age: 81
End: 2021-12-01

## 2021-12-01 ENCOUNTER — EMERGENCY (EMERGENCY)
Facility: HOSPITAL | Age: 81
LOS: 0 days | Discharge: HOME | End: 2021-12-01
Attending: EMERGENCY MEDICINE | Admitting: STUDENT IN AN ORGANIZED HEALTH CARE EDUCATION/TRAINING PROGRAM
Payer: MEDICARE

## 2021-12-01 VITALS
OXYGEN SATURATION: 99 % | DIASTOLIC BLOOD PRESSURE: 70 MMHG | HEART RATE: 77 BPM | SYSTOLIC BLOOD PRESSURE: 143 MMHG | TEMPERATURE: 97 F | RESPIRATION RATE: 20 BRPM | HEIGHT: 65 IN

## 2021-12-01 VITALS — DIASTOLIC BLOOD PRESSURE: 73 MMHG | SYSTOLIC BLOOD PRESSURE: 173 MMHG | HEART RATE: 80 BPM

## 2021-12-01 DIAGNOSIS — K59.00 CONSTIPATION, UNSPECIFIED: ICD-10-CM

## 2021-12-01 DIAGNOSIS — R10.9 UNSPECIFIED ABDOMINAL PAIN: ICD-10-CM

## 2021-12-01 DIAGNOSIS — Z02.9 ENCOUNTER FOR ADMINISTRATIVE EXAMINATIONS, UNSPECIFIED: ICD-10-CM

## 2021-12-01 DIAGNOSIS — I10 ESSENTIAL (PRIMARY) HYPERTENSION: ICD-10-CM

## 2021-12-01 DIAGNOSIS — E78.5 HYPERLIPIDEMIA, UNSPECIFIED: ICD-10-CM

## 2021-12-01 DIAGNOSIS — Z87.442 PERSONAL HISTORY OF URINARY CALCULI: ICD-10-CM

## 2021-12-01 DIAGNOSIS — N28.1 CYST OF KIDNEY, ACQUIRED: ICD-10-CM

## 2021-12-01 DIAGNOSIS — N20.1 CALCULUS OF URETER: ICD-10-CM

## 2021-12-01 LAB
ALBUMIN SERPL ELPH-MCNC: 4.7 G/DL — SIGNIFICANT CHANGE UP (ref 3.5–5.2)
ALP SERPL-CCNC: 57 U/L — SIGNIFICANT CHANGE UP (ref 30–115)
ALT FLD-CCNC: 16 U/L — SIGNIFICANT CHANGE UP (ref 0–41)
ANION GAP SERPL CALC-SCNC: 19 MMOL/L — HIGH (ref 7–14)
APPEARANCE UR: CLEAR — SIGNIFICANT CHANGE UP
AST SERPL-CCNC: 20 U/L — SIGNIFICANT CHANGE UP (ref 0–41)
BACTERIA # UR AUTO: NEGATIVE — SIGNIFICANT CHANGE UP
BASOPHILS # BLD AUTO: 0.03 K/UL — SIGNIFICANT CHANGE UP (ref 0–0.2)
BASOPHILS NFR BLD AUTO: 0.2 % — SIGNIFICANT CHANGE UP (ref 0–1)
BILIRUB SERPL-MCNC: 0.9 MG/DL — SIGNIFICANT CHANGE UP (ref 0.2–1.2)
BILIRUB UR-MCNC: NEGATIVE — SIGNIFICANT CHANGE UP
BUN SERPL-MCNC: 34 MG/DL — HIGH (ref 10–20)
CALCIUM SERPL-MCNC: 9.5 MG/DL — SIGNIFICANT CHANGE UP (ref 8.5–10.1)
CHLORIDE SERPL-SCNC: 98 MMOL/L — SIGNIFICANT CHANGE UP (ref 98–110)
CO2 SERPL-SCNC: 22 MMOL/L — SIGNIFICANT CHANGE UP (ref 17–32)
COLOR SPEC: YELLOW — SIGNIFICANT CHANGE UP
CREAT SERPL-MCNC: 2.1 MG/DL — HIGH (ref 0.7–1.5)
CULTURE RESULTS: SIGNIFICANT CHANGE UP
DIFF PNL FLD: ABNORMAL
EOSINOPHIL # BLD AUTO: 0.01 K/UL — SIGNIFICANT CHANGE UP (ref 0–0.7)
EOSINOPHIL NFR BLD AUTO: 0.1 % — SIGNIFICANT CHANGE UP (ref 0–8)
EPI CELLS # UR: 1 /HPF — SIGNIFICANT CHANGE UP (ref 0–5)
GLUCOSE SERPL-MCNC: 98 MG/DL — SIGNIFICANT CHANGE UP (ref 70–99)
GLUCOSE UR QL: NEGATIVE — SIGNIFICANT CHANGE UP
HCT VFR BLD CALC: 43.1 % — SIGNIFICANT CHANGE UP (ref 42–52)
HGB BLD-MCNC: 13.4 G/DL — LOW (ref 14–18)
HYALINE CASTS # UR AUTO: 1 /LPF — SIGNIFICANT CHANGE UP (ref 0–7)
IMM GRANULOCYTES NFR BLD AUTO: 0.5 % — HIGH (ref 0.1–0.3)
KETONES UR-MCNC: NEGATIVE — SIGNIFICANT CHANGE UP
LACTATE SERPL-SCNC: 1.5 MMOL/L — SIGNIFICANT CHANGE UP (ref 0.7–2)
LEUKOCYTE ESTERASE UR-ACNC: ABNORMAL
LYMPHOCYTES # BLD AUTO: 1.32 K/UL — SIGNIFICANT CHANGE UP (ref 1.2–3.4)
LYMPHOCYTES # BLD AUTO: 10.1 % — LOW (ref 20.5–51.1)
MCHC RBC-ENTMCNC: 27.2 PG — SIGNIFICANT CHANGE UP (ref 27–31)
MCHC RBC-ENTMCNC: 31.1 G/DL — LOW (ref 32–37)
MCV RBC AUTO: 87.4 FL — SIGNIFICANT CHANGE UP (ref 80–94)
MONOCYTES # BLD AUTO: 0.99 K/UL — HIGH (ref 0.1–0.6)
MONOCYTES NFR BLD AUTO: 7.6 % — SIGNIFICANT CHANGE UP (ref 1.7–9.3)
NEUTROPHILS # BLD AUTO: 10.66 K/UL — HIGH (ref 1.4–6.5)
NEUTROPHILS NFR BLD AUTO: 81.5 % — HIGH (ref 42.2–75.2)
NITRITE UR-MCNC: NEGATIVE — SIGNIFICANT CHANGE UP
NRBC # BLD: 0 /100 WBCS — SIGNIFICANT CHANGE UP (ref 0–0)
PH UR: 5.5 — SIGNIFICANT CHANGE UP (ref 5–8)
PLATELET # BLD AUTO: 218 K/UL — SIGNIFICANT CHANGE UP (ref 130–400)
POTASSIUM SERPL-MCNC: 4.6 MMOL/L — SIGNIFICANT CHANGE UP (ref 3.5–5)
POTASSIUM SERPL-SCNC: 4.6 MMOL/L — SIGNIFICANT CHANGE UP (ref 3.5–5)
PROT SERPL-MCNC: 7.3 G/DL — SIGNIFICANT CHANGE UP (ref 6–8)
PROT UR-MCNC: SIGNIFICANT CHANGE UP
RBC # BLD: 4.93 M/UL — SIGNIFICANT CHANGE UP (ref 4.7–6.1)
RBC # FLD: 13.5 % — SIGNIFICANT CHANGE UP (ref 11.5–14.5)
RBC CASTS # UR COMP ASSIST: 7 /HPF — HIGH (ref 0–4)
SODIUM SERPL-SCNC: 139 MMOL/L — SIGNIFICANT CHANGE UP (ref 135–146)
SP GR SPEC: 1.02 — SIGNIFICANT CHANGE UP (ref 1.01–1.03)
SPECIMEN SOURCE: SIGNIFICANT CHANGE UP
UROBILINOGEN FLD QL: SIGNIFICANT CHANGE UP
WBC # BLD: 13.07 K/UL — HIGH (ref 4.8–10.8)
WBC # FLD AUTO: 13.07 K/UL — HIGH (ref 4.8–10.8)
WBC UR QL: 12 /HPF — HIGH (ref 0–5)

## 2021-12-01 PROCEDURE — 76770 US EXAM ABDO BACK WALL COMP: CPT | Mod: 26

## 2021-12-01 PROCEDURE — 99285 EMERGENCY DEPT VISIT HI MDM: CPT

## 2021-12-01 PROCEDURE — 99282 EMERGENCY DEPT VISIT SF MDM: CPT

## 2021-12-01 RX ORDER — MORPHINE SULFATE 50 MG/1
2 CAPSULE, EXTENDED RELEASE ORAL ONCE
Refills: 0 | Status: DISCONTINUED | OUTPATIENT
Start: 2021-12-01 | End: 2021-12-01

## 2021-12-01 RX ORDER — SODIUM CHLORIDE 9 MG/ML
1000 INJECTION INTRAMUSCULAR; INTRAVENOUS; SUBCUTANEOUS ONCE
Refills: 0 | Status: COMPLETED | OUTPATIENT
Start: 2021-12-01 | End: 2021-12-01

## 2021-12-01 RX ORDER — TAMSULOSIN HYDROCHLORIDE 0.4 MG/1
0.4 CAPSULE ORAL AT BEDTIME
Refills: 0 | Status: DISCONTINUED | OUTPATIENT
Start: 2021-12-01 | End: 2021-12-01

## 2021-12-01 RX ORDER — MORPHINE SULFATE 50 MG/1
4 CAPSULE, EXTENDED RELEASE ORAL ONCE
Refills: 0 | Status: DISCONTINUED | OUTPATIENT
Start: 2021-12-01 | End: 2021-12-01

## 2021-12-01 RX ORDER — KETOROLAC TROMETHAMINE 30 MG/ML
15 SYRINGE (ML) INJECTION ONCE
Refills: 0 | Status: DISCONTINUED | OUTPATIENT
Start: 2021-12-01 | End: 2021-12-01

## 2021-12-01 RX ADMIN — MORPHINE SULFATE 4 MILLIGRAM(S): 50 CAPSULE, EXTENDED RELEASE ORAL at 13:29

## 2021-12-01 RX ADMIN — MORPHINE SULFATE 2 MILLIGRAM(S): 50 CAPSULE, EXTENDED RELEASE ORAL at 17:23

## 2021-12-01 RX ADMIN — TAMSULOSIN HYDROCHLORIDE 0.4 MILLIGRAM(S): 0.4 CAPSULE ORAL at 16:51

## 2021-12-01 RX ADMIN — Medication 15 MILLIGRAM(S): at 16:51

## 2021-12-01 RX ADMIN — Medication 15 MILLIGRAM(S): at 17:23

## 2021-12-01 RX ADMIN — SODIUM CHLORIDE 1000 MILLILITER(S): 9 INJECTION INTRAMUSCULAR; INTRAVENOUS; SUBCUTANEOUS at 13:29

## 2021-12-01 RX ADMIN — MORPHINE SULFATE 4 MILLIGRAM(S): 50 CAPSULE, EXTENDED RELEASE ORAL at 17:23

## 2021-12-01 RX ADMIN — MORPHINE SULFATE 2 MILLIGRAM(S): 50 CAPSULE, EXTENDED RELEASE ORAL at 14:30

## 2021-12-01 NOTE — ED PROVIDER NOTE - ATTENDING CONTRIBUTION TO CARE
82 y/o male wit h/o htn, hld, PPM, in ER for eval of L flank pain, dx'd with L ureteral stone yesterday.  Pt states pain started 2 days ago, L flank sharp pain assoc with N and V.  Was seen in ER and CT showed 5 x 5 x 5 mm L prox ureteral stone.  creat 2.2 (was 1.8 6/2021), pt seen by  in ER, d/c'd home with pain meds and trial of passage.  Pt states he's continuing to have pain, minimal relief from prescribed oxycodone, pharmacy did not have flomax.  + N, no vomiting.  + decreased PO intake.  no diarrhea.  no f/c.  no cp/sob.  no ha/dizziness/loc.    PE  - nad, nc/at, eomi, perrl, op - clear, cta b/l, no w/r/r, rrr, abd- soft, + L sided abd tenderness, no guarding/rebound, nabs, from x 4, A&O x 3, no focal neuro deficits.  - pain meds, check labs, ua,  consult.

## 2021-12-01 NOTE — ED ADULT TRIAGE NOTE - CHIEF COMPLAINT QUOTE
pt. c'o abdominal pain and distention pt. states hasn't had a BM in 3 days pt. on oxycodone. pt. was dx with left sided renal colic on Monday

## 2021-12-01 NOTE — ED ADULT NURSE NOTE - OBJECTIVE STATEMENT
patient presents to the ED c/o right flank pain. patient states " I was here on Monday for the same thing and nothing is helping the pain". patient reports he was unable to obtain the Flomax medication r/t pharmacy not having the medication. patient also stated he has not had a bowel movement in 3 days.

## 2021-12-01 NOTE — ED PROVIDER NOTE - PATIENT PORTAL LINK FT
You can access the FollowMyHealth Patient Portal offered by [facility] by registering at the following website: [email or web address]. By joining [employer]’s FollowMyHealth portal, you will also be able to view your health information using other applications (apps) compatible with our system.

## 2021-12-01 NOTE — ED PROVIDER NOTE - PROGRESS NOTE DETAILS
pt's pain has improved. pt refused rectal exam at this time. discussed u/s with urology and he state pt is cleared to f/u with Dr. Strong tomorrow due to no hydro

## 2021-12-01 NOTE — CONSULT NOTE ADULT - SUBJECTIVE AND OBJECTIVE BOX
Patient is a 81y old  Male who presents with a chief complaint of abdominal pain.    HPI: Urology: pt was seen in the ED yesterday and was found to have a left proximal 5 mm ureteral stone.  Pt presents today for recurrent pain but also c/o constipation for 3 days. Pt denies pain at this time.  Feels much better.     PAST MEDICAL & SURGICAL HISTORY:  No pertinent past medical history    No significant past surgical history        REVIEW OF SYSTEMS:    [x ] a 10 point review of systems was negative except where noted    MEDICATIONS  (STANDING):  tamsulosin 0.4 milliGRAM(s) Oral at bedtime    MEDICATIONS  (PRN):  oxycodone    Allergies    No Known Allergies    SOCIAL HISTORY: No illicit drug use    FAMILY HISTORY: non contributory to current issue      Vital Signs Last 24 Hrs  T(C): 36.2 (01 Dec 2021 11:58), Max: 36.2 (01 Dec 2021 11:58)  T(F): 97.1 (01 Dec 2021 11:58), Max: 97.1 (01 Dec 2021 11:58)  HR: 88 (01 Dec 2021 15:46) (77 - 90)  BP: 181/83 (01 Dec 2021 15:46) (143/70 - 189/93)  RR: 20 (01 Dec 2021 11:58) (20 - 20)  SpO2: 99% (01 Dec 2021 11:58) (99% - 99%)    Daily Height in cm: 165.1 (01 Dec 2021 11:58)      PHYSICAL EXAM:    Constitutional: NAD, well-developed, well nourished  HEENT: NC/AT  Neck: no pain, FROM  Back: No CVA tenderness  Respiratory: No accessory respiratory muscle use  Abd: Soft, NT/ND  no organomegally  no hernia  : voiding freely clear urine  Extremities: no edema  Neurological: A/O x 3  Psychiatric: Normal mood, normal affect  Skin: No rashes    I&O's Summary      LABS:                        13.4   13.07 )-----------( 218      ( 01 Dec 2021 16:34 )             43.1     12    139  |  98  |  34<H>  ----------------------------<  98  4.6   |  22  |  2.1<H>    Ca    9.5      01 Dec 2021 16:34    TPro  7.3  /  Alb  4.7  /  TBili  0.9  /  DBili  x   /  AST  20  /  ALT  16  /  AlkPhos  57  12-      Urinalysis Basic - ( 01 Dec 2021 13:51 )    Color: Yellow / Appearance: Clear / S.024 / pH: x  Gluc: x / Ketone: Negative  / Bili: Negative / Urobili: <2 mg/dL   Blood: x / Protein: Trace / Nitrite: Negative   Leuk Esterase: Moderate / RBC: 7 /HPF / WBC 12 /HPF   Sq Epi: x / Non Sq Epi: 1 /HPF / Bacteria: Negative      RADIOLOGY & ADDITIONAL STUDIES:    < from: US Kidney and Bladder (21 @ 15:10) >    EXAM:  US KIDNEYS AND BLADDER            PROCEDURE DATE:  2021            INTERPRETATION:  CLINICAL INFORMATION: Hydronephrosis    COMPARISON: Renal ultrasound dated 2019    TECHNIQUE: Sonography of the kidneys and bladder.    FINDINGS:    Right kidney: 11.2 cm. No hydronephrosis or calculi. 2.5 cm simple cyst. Parapelvic cysts.    Left kidney:  15.1 cm. Slightly degraded imaging of the left kidney. No hydronephrosis. Subcentimeter left lower pole renal cyst with adjacent 1.2 cm calcification. 5 cm left lower pole septated cyst.    Urinary bladder: No debris or calculus. Bilateral ureteral jets are visualized. Prevoid volume of approximately 336 (ml). Patient did not void at time of exam.    IMPRESSION:    No definite hydronephrosis is identified.  Bilateral ureteral jets are visualized.  Renal cysts as above    --- End of Report ---              CATRACHITA DORADO MD; Attending Radiologist  This document has been electronically signed. Dec  1 2021  3:21PM    < end of copied text >

## 2021-12-01 NOTE — ED PROVIDER NOTE - CLINICAL SUMMARY MEDICAL DECISION MAKING FREE TEXT BOX
Labs reviewed.  creat 2.1 ( was 2.2 yesterday).  sono: no definite hydro. + b/l ureteral jets.  Pt seen and eval by IRVNI. at this time, pt's pain is controlled, pt states his pain has completely resolved.  plan is for pt to be d/c'd home and he is to f/u in office with Dr. Botello tomorrow.  Pt told to return to ER for worsening pain, fever, vomiting, or any other new/concerning symptoms.  pt understands and agrees with plan.

## 2021-12-01 NOTE — ED PROVIDER NOTE - CARE PROVIDER_API CALL
Aris Botello)  Urology  [address] [place] [address], [address]  [place] [address]  Phone: [phone]  Fax: [phone]  Follow Up Time: 1-3 Days

## 2021-12-01 NOTE — CONSULT NOTE ADULT - ASSESSMENT
80 y/o m with 3 days of constipation and left sided ureteral 5 mm stone    A) Left ureteral stone  Resolved hydronephrosis  + b/l ureteral jets present    P) No acute urologic intervention at this time   OP f/u tomorrow with Dr. Botello at [address]  pt advised to return to [facility] ED if pain recurs, he develops fever, chills.  d/w ED PA  d/w Dr. Botello

## 2021-12-01 NOTE — ED PROVIDER NOTE - OBJECTIVE STATEMENT
82 yo male with a pmh of HTN, HLD, and renal stone presents for abdominal pain. pt was seen in ED for the pain yesterday when found to have a L renal stones. pt states to have taken oxycodone with minimal relief and states to have not taken the flomax. pt describes the pain as sharp in nature and mention to have not passed his bowels in 3 days. pt denies any other symptoms including fevers, chill, headache, recent illness/travel, cough, chest pain, or SOB.

## 2021-12-01 NOTE — ED PROVIDER NOTE - NS ED ROS FT
Constitutional: (-) fever  Eyes/ENT: (-) visual changes   Cardiovascular: (-) chest pain, (-) syncope  Respiratory: (-) cough, (-) shortness of breath  Gastrointestinal: (-) vomiting, (-) diarrhea  Genitourinary: (-) dysuria, (-) hesitancy, (-) frequency, abd pain  Musculoskeletal: (-) neck pain, (-) back pain, (-) joint pain  Integumentary: (-) rash, (-) edema  Neurological: (-) headache, (-) altered mental status  Allergic/Immunologic: (-) pruritus

## 2021-12-01 NOTE — ED PROVIDER NOTE - NSFOLLOWUPINSTRUCTIONS_ED_ALL_ED_FT
Please follow up with your primary care physician within 24-72 hours and return immediately if symptoms worsen.    Renal Colic  ImageRenal colic is pain that is caused by a kidney stone.    Follow these instructions at home:  Take medicines only as told by your doctor.  Ask your doctor if it is okay to take over-the-counter medicine for pain.  Drink enough fluid to keep your pee (urine) clear or pale yellow. Drink 6–8 glasses of water each day.  Eat less than 2 grams of salt per day.  Eat less protein. Some foods that have protein are meats, fish, nuts, and dairy.  Try not to eat spinach, rhubarb, nuts, or bran.  Contact a doctor if:  You have a fever or chills.  Your pee smells bad or looks cloudy.  You have pain or burning when you pee.  Get help right away if:  The pain in your side (flank) or your groin suddenly gets worse.  You get confused.  You pass out.  This information is not intended to replace advice given to you by your health care provider. Make sure you discuss any questions you have with your health care provider.

## 2021-12-02 ENCOUNTER — APPOINTMENT (OUTPATIENT)
Dept: UROLOGY | Facility: CLINIC | Age: 81
End: 2021-12-02
Payer: MEDICARE

## 2021-12-02 DIAGNOSIS — R31.29 OTHER MICROSCOPIC HEMATURIA: ICD-10-CM

## 2021-12-02 PROCEDURE — 99214 OFFICE O/P EST MOD 30 MIN: CPT

## 2021-12-02 NOTE — ASSESSMENT
[FreeTextEntry1] : JANETTE SAUL is a 81 year old male who presented with BPH and intermittent urinary retention in the past- maintained on flomax and finasteride. Recently presented at the hospital as a result of renal colic. CT demonstrating  mild Lt hydronephrosis to the Level of proximal ureteral stone and an additional punctate stone proximal, and nonobstructing LLP stone noted.  \par \par Christiana resolved on yesterdays sono\par Plan: \par RBUS in 4 weeks to confirm resolution of hydronephrosis and nephrolithiasis. \par continue Flomax and finasteride (reiterated side effects) \par f/u with the results \par BMP next visit \par For Hx Microhematuria , repeat urine testing in future\par \par

## 2021-12-02 NOTE — HISTORY OF PRESENT ILLNESS
[FreeTextEntry1] : JANETTE SAUL is a 81 year old male who presented with BPH and intermittent urinary retention in the past- maintained on flomax and finasteride. Recently presented at the hospital as a result of renal colic 2/2 obstructing ureteral stone\par \par Pt reports he presented at the Hospital  11/30/2021 with renal colic. Reports he has been experiencing constipation. Reports very mild flank pain at this time. \par Reports he has been taking flomax and finasteride, LUTS stable.\par \par CT a/p images visualized from 11/30/2021 mild Lt hydronephrosis to the Level of 5 mm proximal ureteral stone and an additional 2 mm stone proximal to it. 4 mm nonobstructing LLP stone noted. \par BL parapelvic cyst. \par \par Cr=2.1 from 12/1/2021 electrolytes ok\par \par UCx= ngtd 11/30/2021\par \par 12/1/2021 visited the hospital. Renal US demonstrating resolved hydronephrosis. \par \par Previously: \par \par UA w microheme and pyuria 10/2019\par NO UCx\par repeat urine testing neg for blood\par Ua from pcp 10/22/21-neg blood\par bladder scan from today 11/18/21- 41ml\par \par Renal and bladder ultrasound images from 9/2019 shows post void residual 147 g with BPH 71 g.  And moderate intravesical protrusion\par Left renal cyst.\par \par Urinalysis negative for microscopic hematuria\par \par Denies  PMH including kidney stones, recurrent UTIs. \par Family History: unknown as pt adopted\par \par Old records reviewed\par Cr 1.9 similar to prior. States had prior PSA which were normal.\par Prior bladder sono 2016 78g prostate and pt unable to void 450 ml volume\par

## 2021-12-02 NOTE — ADDENDUM
[FreeTextEntry1] : Patient's note was transcribed with the assistance of a medical scribe under the supervision of Dr. Botello.\par I, Dr. Botello, have reviewed the patient's chart and agree that it aligns with my medical decisions.\par Kb Portillo, our scribe, also served as a chaperone for physical examination purposes.\par \par \par

## 2021-12-03 LAB
CULTURE RESULTS: SIGNIFICANT CHANGE UP
SPECIMEN SOURCE: SIGNIFICANT CHANGE UP

## 2021-12-04 ENCOUNTER — INPATIENT (INPATIENT)
Facility: HOSPITAL | Age: 81
LOS: 4 days | Discharge: HOME | End: 2021-12-09
Attending: HOSPITALIST | Admitting: HOSPITALIST
Payer: MEDICARE

## 2021-12-04 VITALS
HEART RATE: 75 BPM | HEIGHT: 65 IN | RESPIRATION RATE: 19 BRPM | WEIGHT: 154.98 LBS | SYSTOLIC BLOOD PRESSURE: 155 MMHG | OXYGEN SATURATION: 97 % | DIASTOLIC BLOOD PRESSURE: 74 MMHG | TEMPERATURE: 98 F

## 2021-12-04 LAB
ALBUMIN SERPL ELPH-MCNC: 3.4 G/DL — LOW (ref 3.5–5.2)
ALBUMIN SERPL ELPH-MCNC: 4.1 G/DL — SIGNIFICANT CHANGE UP (ref 3.5–5.2)
ALP SERPL-CCNC: 49 U/L — SIGNIFICANT CHANGE UP (ref 30–115)
ALP SERPL-CCNC: 54 U/L — SIGNIFICANT CHANGE UP (ref 30–115)
ALT FLD-CCNC: 12 U/L — SIGNIFICANT CHANGE UP (ref 0–41)
ALT FLD-CCNC: 15 U/L — SIGNIFICANT CHANGE UP (ref 0–41)
ANION GAP SERPL CALC-SCNC: 15 MMOL/L — HIGH (ref 7–14)
ANION GAP SERPL CALC-SCNC: 16 MMOL/L — HIGH (ref 7–14)
APPEARANCE UR: CLEAR — SIGNIFICANT CHANGE UP
APTT BLD: 25.7 SEC — LOW (ref 27–39.2)
AST SERPL-CCNC: 12 U/L — SIGNIFICANT CHANGE UP (ref 0–41)
AST SERPL-CCNC: 16 U/L — SIGNIFICANT CHANGE UP (ref 0–41)
BACTERIA # UR AUTO: NEGATIVE — SIGNIFICANT CHANGE UP
BASOPHILS # BLD AUTO: 0.01 K/UL — SIGNIFICANT CHANGE UP (ref 0–0.2)
BASOPHILS NFR BLD AUTO: 0.1 % — SIGNIFICANT CHANGE UP (ref 0–1)
BILIRUB SERPL-MCNC: 0.3 MG/DL — SIGNIFICANT CHANGE UP (ref 0.2–1.2)
BILIRUB SERPL-MCNC: 0.6 MG/DL — SIGNIFICANT CHANGE UP (ref 0.2–1.2)
BILIRUB UR-MCNC: NEGATIVE — SIGNIFICANT CHANGE UP
BUN SERPL-MCNC: 34 MG/DL — HIGH (ref 10–20)
BUN SERPL-MCNC: 39 MG/DL — HIGH (ref 10–20)
CALCIUM SERPL-MCNC: 8.7 MG/DL — SIGNIFICANT CHANGE UP (ref 8.5–10.1)
CALCIUM SERPL-MCNC: 9.5 MG/DL — SIGNIFICANT CHANGE UP (ref 8.5–10.1)
CHLORIDE SERPL-SCNC: 102 MMOL/L — SIGNIFICANT CHANGE UP (ref 98–110)
CHLORIDE SERPL-SCNC: 99 MMOL/L — SIGNIFICANT CHANGE UP (ref 98–110)
CO2 SERPL-SCNC: 21 MMOL/L — SIGNIFICANT CHANGE UP (ref 17–32)
CO2 SERPL-SCNC: 22 MMOL/L — SIGNIFICANT CHANGE UP (ref 17–32)
COLOR SPEC: SIGNIFICANT CHANGE UP
CREAT SERPL-MCNC: 2 MG/DL — HIGH (ref 0.7–1.5)
CREAT SERPL-MCNC: 2.2 MG/DL — HIGH (ref 0.7–1.5)
DIFF PNL FLD: ABNORMAL
EOSINOPHIL # BLD AUTO: 0.01 K/UL — SIGNIFICANT CHANGE UP (ref 0–0.7)
EOSINOPHIL NFR BLD AUTO: 0.1 % — SIGNIFICANT CHANGE UP (ref 0–8)
EPI CELLS # UR: 1 /HPF — SIGNIFICANT CHANGE UP (ref 0–5)
GLUCOSE SERPL-MCNC: 131 MG/DL — HIGH (ref 70–99)
GLUCOSE SERPL-MCNC: 134 MG/DL — HIGH (ref 70–99)
GLUCOSE UR QL: NEGATIVE — SIGNIFICANT CHANGE UP
HCT VFR BLD CALC: 33.3 % — LOW (ref 42–52)
HCT VFR BLD CALC: 38.6 % — LOW (ref 42–52)
HGB BLD-MCNC: 10.8 G/DL — LOW (ref 14–18)
HGB BLD-MCNC: 12.2 G/DL — LOW (ref 14–18)
HYALINE CASTS # UR AUTO: 0 /LPF — SIGNIFICANT CHANGE UP (ref 0–7)
IMM GRANULOCYTES NFR BLD AUTO: 0.3 % — SIGNIFICANT CHANGE UP (ref 0.1–0.3)
INR BLD: 1.09 RATIO — SIGNIFICANT CHANGE UP (ref 0.65–1.3)
KETONES UR-MCNC: NEGATIVE — SIGNIFICANT CHANGE UP
LACTATE SERPL-SCNC: 1.2 MMOL/L — SIGNIFICANT CHANGE UP (ref 0.7–2)
LEUKOCYTE ESTERASE UR-ACNC: ABNORMAL
LIDOCAIN IGE QN: 25 U/L — SIGNIFICANT CHANGE UP (ref 7–60)
LYMPHOCYTES # BLD AUTO: 0.92 K/UL — LOW (ref 1.2–3.4)
LYMPHOCYTES # BLD AUTO: 9.4 % — LOW (ref 20.5–51.1)
MAGNESIUM SERPL-MCNC: 2 MG/DL — SIGNIFICANT CHANGE UP (ref 1.8–2.4)
MCHC RBC-ENTMCNC: 26.9 PG — LOW (ref 27–31)
MCHC RBC-ENTMCNC: 27.3 PG — SIGNIFICANT CHANGE UP (ref 27–31)
MCHC RBC-ENTMCNC: 31.6 G/DL — LOW (ref 32–37)
MCHC RBC-ENTMCNC: 32.4 G/DL — SIGNIFICANT CHANGE UP (ref 32–37)
MCV RBC AUTO: 84.3 FL — SIGNIFICANT CHANGE UP (ref 80–94)
MCV RBC AUTO: 85.2 FL — SIGNIFICANT CHANGE UP (ref 80–94)
MONOCYTES # BLD AUTO: 0.66 K/UL — HIGH (ref 0.1–0.6)
MONOCYTES NFR BLD AUTO: 6.7 % — SIGNIFICANT CHANGE UP (ref 1.7–9.3)
NEUTROPHILS # BLD AUTO: 8.15 K/UL — HIGH (ref 1.4–6.5)
NEUTROPHILS NFR BLD AUTO: 83.4 % — HIGH (ref 42.2–75.2)
NITRITE UR-MCNC: NEGATIVE — SIGNIFICANT CHANGE UP
NRBC # BLD: 0 /100 WBCS — SIGNIFICANT CHANGE UP (ref 0–0)
NRBC # BLD: 0 /100 WBCS — SIGNIFICANT CHANGE UP (ref 0–0)
PH UR: 6.5 — SIGNIFICANT CHANGE UP (ref 5–8)
PLATELET # BLD AUTO: 167 K/UL — SIGNIFICANT CHANGE UP (ref 130–400)
PLATELET # BLD AUTO: 190 K/UL — SIGNIFICANT CHANGE UP (ref 130–400)
POTASSIUM SERPL-MCNC: 4.5 MMOL/L — SIGNIFICANT CHANGE UP (ref 3.5–5)
POTASSIUM SERPL-MCNC: 4.8 MMOL/L — SIGNIFICANT CHANGE UP (ref 3.5–5)
POTASSIUM SERPL-SCNC: 4.5 MMOL/L — SIGNIFICANT CHANGE UP (ref 3.5–5)
POTASSIUM SERPL-SCNC: 4.8 MMOL/L — SIGNIFICANT CHANGE UP (ref 3.5–5)
PROT SERPL-MCNC: 5.6 G/DL — LOW (ref 6–8)
PROT SERPL-MCNC: 6.7 G/DL — SIGNIFICANT CHANGE UP (ref 6–8)
PROT UR-MCNC: NEGATIVE — SIGNIFICANT CHANGE UP
PROTHROM AB SERPL-ACNC: 12.5 SEC — SIGNIFICANT CHANGE UP (ref 9.95–12.87)
RBC # BLD: 3.95 M/UL — LOW (ref 4.7–6.1)
RBC # BLD: 4.53 M/UL — LOW (ref 4.7–6.1)
RBC # FLD: 13.2 % — SIGNIFICANT CHANGE UP (ref 11.5–14.5)
RBC # FLD: 13.2 % — SIGNIFICANT CHANGE UP (ref 11.5–14.5)
RBC CASTS # UR COMP ASSIST: 6 /HPF — HIGH (ref 0–4)
SARS-COV-2 RNA SPEC QL NAA+PROBE: SIGNIFICANT CHANGE UP
SODIUM SERPL-SCNC: 136 MMOL/L — SIGNIFICANT CHANGE UP (ref 135–146)
SODIUM SERPL-SCNC: 139 MMOL/L — SIGNIFICANT CHANGE UP (ref 135–146)
SP GR SPEC: 1.01 — SIGNIFICANT CHANGE UP (ref 1.01–1.03)
UROBILINOGEN FLD QL: SIGNIFICANT CHANGE UP
WBC # BLD: 6.68 K/UL — SIGNIFICANT CHANGE UP (ref 4.8–10.8)
WBC # BLD: 9.78 K/UL — SIGNIFICANT CHANGE UP (ref 4.8–10.8)
WBC # FLD AUTO: 6.68 K/UL — SIGNIFICANT CHANGE UP (ref 4.8–10.8)
WBC # FLD AUTO: 9.78 K/UL — SIGNIFICANT CHANGE UP (ref 4.8–10.8)
WBC UR QL: 4 /HPF — SIGNIFICANT CHANGE UP (ref 0–5)

## 2021-12-04 PROCEDURE — 74176 CT ABD & PELVIS W/O CONTRAST: CPT | Mod: 26,MA

## 2021-12-04 PROCEDURE — 99223 1ST HOSP IP/OBS HIGH 75: CPT

## 2021-12-04 PROCEDURE — 99285 EMERGENCY DEPT VISIT HI MDM: CPT

## 2021-12-04 RX ORDER — ONDANSETRON 8 MG/1
4 TABLET, FILM COATED ORAL ONCE
Refills: 0 | Status: DISCONTINUED | OUTPATIENT
Start: 2021-12-04 | End: 2021-12-06

## 2021-12-04 RX ORDER — CARVEDILOL PHOSPHATE 80 MG/1
1 CAPSULE, EXTENDED RELEASE ORAL
Qty: 0 | Refills: 0 | DISCHARGE

## 2021-12-04 RX ORDER — ACETAMINOPHEN 500 MG
650 TABLET ORAL EVERY 6 HOURS
Refills: 0 | Status: DISCONTINUED | OUTPATIENT
Start: 2021-12-04 | End: 2021-12-09

## 2021-12-04 RX ORDER — TAMSULOSIN HYDROCHLORIDE 0.4 MG/1
0.4 CAPSULE ORAL AT BEDTIME
Refills: 0 | Status: DISCONTINUED | OUTPATIENT
Start: 2021-12-04 | End: 2021-12-09

## 2021-12-04 RX ORDER — ASPIRIN/CALCIUM CARB/MAGNESIUM 324 MG
325 TABLET ORAL DAILY
Refills: 0 | Status: DISCONTINUED | OUTPATIENT
Start: 2021-12-04 | End: 2021-12-06

## 2021-12-04 RX ORDER — HYDROMORPHONE HYDROCHLORIDE 2 MG/ML
0.5 INJECTION INTRAMUSCULAR; INTRAVENOUS; SUBCUTANEOUS EVERY 4 HOURS
Refills: 0 | Status: DISCONTINUED | OUTPATIENT
Start: 2021-12-04 | End: 2021-12-05

## 2021-12-04 RX ORDER — HYDROMORPHONE HYDROCHLORIDE 2 MG/ML
1 INJECTION INTRAMUSCULAR; INTRAVENOUS; SUBCUTANEOUS ONCE
Refills: 0 | Status: DISCONTINUED | OUTPATIENT
Start: 2021-12-04 | End: 2021-12-04

## 2021-12-04 RX ORDER — FINASTERIDE 5 MG/1
5 TABLET, FILM COATED ORAL DAILY
Refills: 0 | Status: DISCONTINUED | OUTPATIENT
Start: 2021-12-04 | End: 2021-12-09

## 2021-12-04 RX ORDER — ONDANSETRON 8 MG/1
4 TABLET, FILM COATED ORAL EVERY 8 HOURS
Refills: 0 | Status: DISCONTINUED | OUTPATIENT
Start: 2021-12-04 | End: 2021-12-09

## 2021-12-04 RX ORDER — POLYETHYLENE GLYCOL 3350 17 G/17G
17 POWDER, FOR SOLUTION ORAL DAILY
Refills: 0 | Status: DISCONTINUED | OUTPATIENT
Start: 2021-12-04 | End: 2021-12-09

## 2021-12-04 RX ORDER — SODIUM CHLORIDE 9 MG/ML
1000 INJECTION, SOLUTION INTRAVENOUS
Refills: 0 | Status: DISCONTINUED | OUTPATIENT
Start: 2021-12-04 | End: 2021-12-09

## 2021-12-04 RX ORDER — MORPHINE SULFATE 50 MG/1
2 CAPSULE, EXTENDED RELEASE ORAL EVERY 6 HOURS
Refills: 0 | Status: DISCONTINUED | OUTPATIENT
Start: 2021-12-04 | End: 2021-12-04

## 2021-12-04 RX ORDER — FENOFIBRATE,MICRONIZED 130 MG
48 CAPSULE ORAL DAILY
Refills: 0 | Status: DISCONTINUED | OUTPATIENT
Start: 2021-12-04 | End: 2021-12-09

## 2021-12-04 RX ORDER — SENNA PLUS 8.6 MG/1
2 TABLET ORAL AT BEDTIME
Refills: 0 | Status: DISCONTINUED | OUTPATIENT
Start: 2021-12-04 | End: 2021-12-09

## 2021-12-04 RX ORDER — FENOFIBRATE,MICRONIZED 130 MG
1 CAPSULE ORAL
Qty: 0 | Refills: 0 | DISCHARGE

## 2021-12-04 RX ORDER — CARVEDILOL PHOSPHATE 80 MG/1
3.12 CAPSULE, EXTENDED RELEASE ORAL DAILY
Refills: 0 | Status: DISCONTINUED | OUTPATIENT
Start: 2021-12-04 | End: 2021-12-09

## 2021-12-04 RX ORDER — KETOROLAC TROMETHAMINE 30 MG/ML
15 SYRINGE (ML) INJECTION ONCE
Refills: 0 | Status: DISCONTINUED | OUTPATIENT
Start: 2021-12-04 | End: 2021-12-04

## 2021-12-04 RX ORDER — FINASTERIDE 5 MG/1
1 TABLET, FILM COATED ORAL
Qty: 0 | Refills: 0 | DISCHARGE

## 2021-12-04 RX ORDER — HEPARIN SODIUM 5000 [USP'U]/ML
5000 INJECTION INTRAVENOUS; SUBCUTANEOUS EVERY 12 HOURS
Refills: 0 | Status: DISCONTINUED | OUTPATIENT
Start: 2021-12-04 | End: 2021-12-09

## 2021-12-04 RX ORDER — SODIUM CHLORIDE 9 MG/ML
1000 INJECTION INTRAMUSCULAR; INTRAVENOUS; SUBCUTANEOUS ONCE
Refills: 0 | Status: COMPLETED | OUTPATIENT
Start: 2021-12-04 | End: 2021-12-04

## 2021-12-04 RX ORDER — ATORVASTATIN CALCIUM 80 MG/1
40 TABLET, FILM COATED ORAL AT BEDTIME
Refills: 0 | Status: DISCONTINUED | OUTPATIENT
Start: 2021-12-04 | End: 2021-12-09

## 2021-12-04 RX ADMIN — Medication 15 MILLIGRAM(S): at 02:10

## 2021-12-04 RX ADMIN — MORPHINE SULFATE 2 MILLIGRAM(S): 50 CAPSULE, EXTENDED RELEASE ORAL at 20:01

## 2021-12-04 RX ADMIN — SENNA PLUS 2 TABLET(S): 8.6 TABLET ORAL at 21:23

## 2021-12-04 RX ADMIN — HEPARIN SODIUM 5000 UNIT(S): 5000 INJECTION INTRAVENOUS; SUBCUTANEOUS at 17:50

## 2021-12-04 RX ADMIN — ATORVASTATIN CALCIUM 40 MILLIGRAM(S): 80 TABLET, FILM COATED ORAL at 21:23

## 2021-12-04 RX ADMIN — Medication 325 MILLIGRAM(S): at 14:26

## 2021-12-04 RX ADMIN — SODIUM CHLORIDE 75 MILLILITER(S): 9 INJECTION, SOLUTION INTRAVENOUS at 11:20

## 2021-12-04 RX ADMIN — Medication 48 MILLIGRAM(S): at 14:27

## 2021-12-04 RX ADMIN — SODIUM CHLORIDE 1000 MILLILITER(S): 9 INJECTION INTRAMUSCULAR; INTRAVENOUS; SUBCUTANEOUS at 02:10

## 2021-12-04 RX ADMIN — POLYETHYLENE GLYCOL 3350 17 GRAM(S): 17 POWDER, FOR SOLUTION ORAL at 11:20

## 2021-12-04 RX ADMIN — FINASTERIDE 5 MILLIGRAM(S): 5 TABLET, FILM COATED ORAL at 14:27

## 2021-12-04 RX ADMIN — TAMSULOSIN HYDROCHLORIDE 0.4 MILLIGRAM(S): 0.4 CAPSULE ORAL at 21:23

## 2021-12-04 NOTE — ED PROVIDER NOTE - NS ED ROS FT
Review of Systems:  •	CONSTITUTIONAL - No fever, No diaphoresis, No weight change  •	SKIN - No rash  •	HEMATOLOGIC - No abnormal bleeding or bruising  •	EYES - No eye pain, No blurred vision  •	ENT - No change in hearing, No sore throat, No neck pain, No rhinorrhea, No ear pain  •	RESPIRATORY - No shortness of breath, No cough  •	CARDIAC -No chest pain, No palpitations  •	GI - + abdominal pain, + nausea, No vomiting, No diarrhea, + constipation, No bright red blood per rectum or melena. + flank pain  •                 - No dysuria, frequency, hematuria.   •	ENDO - No polydypsia, No polyuria, No heat/cold intolerance  •	MUSCULOSKELETAL - No joint paint, No swelling, No back pain  •	NEUROLOGIC - No numbness, No focal weakness, No headache, No dizziness  All other systems negative, unless specified in HPI

## 2021-12-04 NOTE — ED ADULT NURSE NOTE - NSIMPLEMENTINTERV_GEN_ALL_ED
Implemented All Universal Safety Interventions:  [place] to call system. Call bell, personal items and telephone within reach. Instruct patient to call for assistance. Room bathroom lighting operational. Non-slip footwear when patient is off stretcher. Physically safe environment: no spills, clutter or unnecessary equipment. Stretcher in lowest position, wheels locked, appropriate side rails in place.

## 2021-12-04 NOTE — ED PROVIDER NOTE - CLINICAL SUMMARY MEDICAL DECISION MAKING FREE TEXT BOX
pt evaluated for flank pain with known nephrolithiasis, pt evaluated by urology, no intervention acutely, admitted for pain control

## 2021-12-04 NOTE — PATIENT PROFILE ADULT - FALL HARM RISK - UNIVERSAL INTERVENTIONS
Bed in lowest position, wheels locked, appropriate side rails in place/Call bell, personal items and telephone in reach/Instruct patient to call for assistance before getting out of bed or chair/Non-slip footwear when patient is out of bed/[place] to call system/Physically safe environment - no spills, clutter or unnecessary equipment/Purposeful Proactive Rounding/Room/bathroom lighting operational, light cord in reach

## 2021-12-04 NOTE — H&P ADULT - HISTORY OF PRESENT ILLNESS
81 year old male with pmh of CKD, CAD s/p PCI, HTN, high cholesterol, PPM for unknown reason (on ASA 325mg. Patient said he was on eliquis in the past but he had bleeding and was switched to aspiring high dose), BPH, presents to the ED with abdominal pain for the past 5 days. Pt follows with Dr. Botello as outpatient for management of his BPH. Patient has presented to the ED multiple times for abdominal pain and was found on 11/30/21 to have mild left perinephric stranding and proximal hydronephroureter extending to the level of an obstructing 5 x 5 x 5 mm left proximal ureteral calculus; and additional 2 mm calculus is noted within the left proximal ureter just proximal to the obstructing stone. Nonobstructing 4 mm left renal lower pole calculus. Cholelithiasis, sigmoid colonic diverticulosis, without evidence of diverticulitis and an enlarged prostate gland with coarse calcifications. At that time, patient's pain was controlled and patient was discharged with  follow up on 12/1/21. Patient followed with Dr. Botello the next day and was discharged on flomax for continued trial of passage of stone. Patient returned today complaining of abdominal pain and noted that he has not moved his bowels for the past 5 days. Patient reports pain is left sided and radiates to the mid lower abdomen. Pain is severe and stabbing in nature. he reports no fever or chills. no dysuria, frequency, hematuria, or passage of any stone. no flank pain. Patient also reports some nausea and was about to vomit.    In the ED, patient was HD stable. labs showed no new abnormalities.  consulted for further evaluation what repeat CTAP on 12/4/21 revealed persistent mild left hydronephrosis with interval migration of previously described two left ureteral calculi, now both adjacent within left distal ureter, measuring up to 0.6 x 0.5 x 0.4 cm distally and 0.3 x 0.2 x 0.2 cm proximally. Previously seen nonobstructing left renal calculi not identified. Patient seen and examined with wife at bedside in the ED. Patient reports resolution of his pain with pain medication.

## 2021-12-04 NOTE — ED PROVIDER NOTE - OBJECTIVE STATEMENT
Patient is a 80 yo male with PMHx of HTN, HLD, kidney stones, CKD, BPH, ischemic cardiomyopathy s/p pacemaker c/o abdominal pain x 5 days. Patient started 5 days ago with severe, sharp, intermittent left flank pain, radiating to the left side of the abdomen, associated with nausea. Was found to have 5-6 mm and 2 mm stone in proximal left ureter, seen by urology, recommends trial of passing stones. Patient came back on 12/1 with similar pain, seen by urology with similar recommendations, seen by urologist Dr. Botello yesterday and recommends trial to pass stone. Today, patient came back with similar pain. Denies fever, chills, chest pain, SOB, cough, diarrhea. Also, now c/o of constipation and last BM 5 days ago. Patient is a 80 yo male with PMHx of HTN, HLD, kidney stones, CKD, BPH, s/p pacemaker c/o abdominal pain x 5 days. Patient started 5 days ago with severe, sharp, intermittent left flank pain, radiating to the left side of the abdomen, associated with nausea. Was found to have 5-6 mm and 2 mm stone in proximal left ureter, seen by urology, recommends trial of passing stones. Patient came back on 12/1 with similar pain, seen by urology with similar recommendations, seen by urologist Dr. Botello yesterday and recommends trial to pass stone. Today, patient came back with similar pain. Denies fever, chills, chest pain, SOB, cough, diarrhea. Also, now c/o of constipation and last BM 5 days ago.

## 2021-12-04 NOTE — H&P ADULT - ATTENDING COMMENTS
81 year old male with PMH of HTN, CAD s/p PCI, ICD and CKD stage 3-4 and BPH came to ED for abdominal pain for the last 5 days, this is his 3rd visit since 11/30, when his pain started, he was found with left proximal ureteral stone and recommended with hydration and pain control, he denies fever, chills or vomiting, still unable to pass the urine. In the ED, patient was HD stable. labs showed no new abnormalities.  consulted for further evaluation what repeat CTAP on 12/4/21 revealed persistent mild left hydronephrosis with interval migration of previously described two left ureteral calculi, now both adjacent within left distal ureter, measuring up to 0.6 x 0.5 x 0.4 cm distally and 0.3 x 0.2 x 0.2 cm proximally.    PHYSICAL EXAM:  GENERAL: NAD, well-developed.  HEAD:  Atraumatic, Normocephalic.  EYES: EOMI, PERRLA, conjunctiva and sclera clear.  NECK: Supple, No JVD.  CHEST/LUNG: Clear to auscultation bilaterally; No wheeze.  HEART: Regular rate and rhythm; S1 S2.   ABDOMEN: Soft, Nontender, Nondistended; Bowel sounds present.  EXTREMITIES:  2+ Peripheral Pulses, No clubbing, cyanosis, or edema.  PSYCH: AAOx3.  NEUROLOGY: non-focal.  SKIN: No rashes or lesions.    A/P:   Left nephrolithiasis with mild hydronephrosis:   CT abdomen and Pelvis: showed  revealed persistent mild left hydronephrosis with interval migration of previously described two left ureteral calculi, now both adjacent within left distal ureter, measuring up to 0.6 x 0.5 x 0.4 cm distally and 0.3 x 0.2 x 0.2 cm proximally.  Urology recommended iV fluid, pain management, urine strain.   UA negative for UTI  Pain control. Started on Flomax.     CKD stage 4  Cr 2.0 stable.     Chronic HfrEF: s/p AICD  stable  Continue Metoprolol.     CAD s/p PCI  Continue ASA, Lipitor and Metoprolol

## 2021-12-04 NOTE — H&P ADULT - NSHPPHYSICALEXAM_GEN_ALL_CORE
LOS:     VITALS:   T(C): 37.1 (12-04-21 @ 09:00), Max: 37.1 (12-04-21 @ 09:00)  HR: 71 (12-04-21 @ 09:00) (63 - 80)  BP: 141/71 (12-04-21 @ 09:00) (120/72 - 155/74)  RR: 19 (12-04-21 @ 09:00) (18 - 19)  SpO2: 98% (12-04-21 @ 09:00) (96% - 98%)    GENERAL: NAD, lying in bed comfortably  HEAD:  Atraumatic, Normocephalic  EYES: EOMI, PERRLA, conjunctiva and sclera clear  ENT: Moist mucous membranes  NECK: Supple, No JVD  CHEST/LUNG: Clear to auscultation bilaterally; No rales, rhonchi, wheezing, or rubs. Unlabored respirations  HEART: Regular rate and rhythm; No murmurs, rubs, or gallops  ABDOMEN: BSx4; Soft, nontender, nondistended  EXTREMITIES:  2+ Peripheral Pulses, brisk capillary refill. No clubbing, cyanosis, or edema. left arm echymosis  NERVOUS SYSTEM:  A&Ox3, no focal deficits   SKIN: No rashes or lesions

## 2021-12-04 NOTE — ED PROVIDER NOTE - PROGRESS NOTE DETAILS
Patient remained stable in ED, pending CT/urology consult/labs, signed out to Dr. Koroma at shift change. Pt s/o to me by Dr. Schwab to follow up imaging, UA, urology consult, reassess and dispo. MQ: Patient still having a lot of pain, will admit for pain control, urology says no intervention for now

## 2021-12-04 NOTE — ED PROVIDER NOTE - PHYSICAL EXAMINATION
CONSTITUTIONAL: Frail old male, laying on the bed  SKIN: warm, dry  HEAD: Normocephalic; atraumatic.  EYES: no conjunctival injection. PERRL.   ENT: No nasal discharge; airway clear.  NECK: Supple; non tender.  CARD: S1, S2 normal; no murmurs, gallops, or rubs. Regular rate and rhythm.   RESP: No wheezes, rales or rhonchi.  ABD: soft, tender in left side of abdomen, no rebound tenderness or guarding, + left CVAT  EXT: Normal ROM.  No clubbing, cyanosis or edema.   LYMPH: No acute cervical adenopathy.  NEURO: Alert, oriented, grossly unremarkable  PSYCH: Cooperative, appropriate.

## 2021-12-04 NOTE — PROVIDER CONTACT NOTE (OTHER) - SITUATION
upon assessment pt noted to have large hematoma to Left FA. pt c/o pain at times as per pt it occurred after  IV placement in ED
pt is c/o no BM x5 days. pt receives Miralax this afternoon and is scheduled to receive senna at bedtime. pt reported poor PO intake during this time, but did eat some breakfast and lunch today.

## 2021-12-04 NOTE — ED ADULT TRIAGE NOTE - CHIEF COMPLAINT QUOTE
pt c/o abdominal pain for the last 5 days. pt states that he has been unable to have a bowel movement in the last 5 days associated with nausea.

## 2021-12-04 NOTE — CONSULT NOTE ADULT - SUBJECTIVE AND OBJECTIVE BOX
Urology Consult Note: 81 year old male with pmh of CKD, HTN, high cholesterol, PPM (on ASA 325mg), BPH presents to the ED with abdominal pain for the past 5 days. Pt follows with Dr. Botello as outpatient for management of his BPH. Patient has presented to the ED multiple times for abdominal pain and was found on 21 to have mild left perinephric stranding and proximal hydronephroureter extending to the level of an obstructing 5 x 5 x 5 mm left proximal ureteral calculus (approximately 1080 Hounsfield units); and additional 2 mm calculus is noted within the left proximal ureter just proximal to the obstructing stone. Nonobstructing 4 mm left renal lower pole calculus. Cholelithiasis, sigmoid colonic diverticulosis, without evidence of diverticulitis and an enlarged prostate gland with coarse calcifications. At that time, patient's pain was controlled and patient was discharged with  follow up on 21. Patient returned to the ED the next day complaining of abdominal pain and RBUS showed resolution of patient's mild left hydronephroureter and patient was told to keep his appointment with Dr. Botello the next day and was subsequently discharged. Patient presented to his outpatient  appointment and was discharged on flomax for continued trial of passage of stone. Patient returned today complaining of abdominal pain and noted that he has not moved his bowels for the past 5 days.  consulted for further evaluation what repeat CTAP on 21 revealed persistent mild left hydronephrosis with interval migration of previously described two left ureteral calculi, now both adjacent within left distal ureter, measuring up to 0.6 x 0.5 x 0.4 cm distally and 0.3 x 0.2 x 0.2 cm proximally. Previously seen nonobstructing left renal calculi not identified. Patient seen and examined with wife at bedside in the ED. Patient reports resolution of his pain with pain medication. Patient denies fever, chills, flank pain, dysuria, hematuria, or difficulty voiding.      PAST MEDICAL & SURGICAL HISTORY:  CKD  HTN  PPM   HLD  BPH    No significant past surgical history    [ x ] A 10 Point Review of Systems was negative except where noted    MEDICATIONS  (STANDING):  ondansetron Injectable 4 milliGRAM(s) IV Push once    Allergies: No Known Allergies    SOCIAL HISTORY: No illicit drug use    Vital Signs Last 24 Hrs  T(C): 36.7 (04 Dec 2021 01:09), Max: 36.7 (04 Dec 2021 01:09)  T(F): 98.1 (04 Dec 2021 01:09), Max: 98.1 (04 Dec 2021 01:09)  HR: 75 (04 Dec 2021 01:09) (75 - 75)  BP: 155/74 (04 Dec 2021 01:09) (155/74 - 155/74)  RR: 19 (04 Dec 2021 01:09) (19 - 19)  SpO2: 97% (04 Dec 2021 01:09) (97% - 97%)    PHYSICAL EXAM:  Constitutional: NAD, well-developed  HEENT: NC/AT  Back: No CVA ttp bilaterally  Resp: No accessory respiratory muscle use  Abd: Soft, NT/ND. No suprapubic ttp  Cardio: +S1/S2  :   Ext: No edema or cyanosis, GARRETT x 4  Neuro: Awake and alert  Psych: Normal mood, normal affect  Skin: Good color, non diaphoretic    LABS:                      12.2   9.78  )-----------( 190      ( 04 Dec 2021 02:15 )             38.6     12-04  136  |  99  |  34<H>  ----------------------------<  134<H>  4.8   |  21  |  2.0<H>    Ca    9.5      04 Dec 2021 02:15  TPro  6.7  /  Alb  4.1  /  TBili  0.6  /  DBili  x   /  AST  16  /  ALT  15  /  AlkPhos  54  12-04    Urinalysis Basic - ( 04 Dec 2021 01:55 )  Color: Light Yellow / Appearance: Clear / S.011 / pH: x  Gluc: x / Ketone: Negative  / Bili: Negative / Urobili: <2 mg/dL   Blood: x / Protein: Negative / Nitrite: Negative   Leuk Esterase: Small / RBC: 6 /HPF / WBC 4 /HPF   Sq Epi: x / Non Sq Epi: 1 /HPF / Bacteria: Negative    RADIOLOGY & ADDITIONAL STUDIES:  < from: CT Abdomen and Pelvis No Cont (21 @ 04:36) >    EXAM:  CT ABDOMEN AND PELVIS            PROCEDURE DATE:  2021      INTERPRETATION:  CLINICAL STATEMENT: Left flank pain.    TECHNIQUE: Contiguous axial CT images were obtained from the lower chest to the pubic symphysis without intravenous contrast.  Oral contrast was not administered.  Reformatted images in the coronal and sagittal planes were acquired.    Comparison made with CT abdomen and pelvis 2021.    FINDINGS:    LOWER CHEST: Dependent atelectasis. Cardiac pacing device.    HEPATOBILIARY: Cholelithiasis.. Unenhanced liver unremarkable. No biliary dilation.    SPLEEN: Unremarkable.    PANCREAS: Unremarkable.    ADRENAL GLANDS: Unremarkable.    KIDNEYS: Persistent mild left hydronephrosis with interval migration of previously described two left ureteral calculi, now both adjacent within left distal ureter, measuring up to 0.6 x 0.5 x 0.4 cm distally (average Hounsfield units 1400) and 0.3 x 0.2 x 0.2 cm proximally. Bilateral renal cysts. No right hydronephrosis. Previously seen nonobstructing left renal calculi not identified.    ABDOMINOPELVIC NODES: Unremarkable.    PELVIC ORGANS: BPH. Urinary bladder unremarkable.    PERITONEUM/MESENTERY/BOWEL: Colonic diverticulosis. No bowel obstruction. No ascites.    BONES/SOFT TISSUES: Degenerative change, lumbar spine.    OTHER: Vascular calcifications. Fat-containing umbilical hernia.      IMPRESSION:  Since 2021:    1. Persistent mild left hydronephrosis with interval migration of previously described two left ureteral calculi, now both adjacent within left distal ureter, measuring up to 0.6 x 0.5 x 0.4 cm distally and 0.3 x 0.2 x 0.2 cm proximally.    2. Previously seen nonobstructing left renal calculi not identified.    --- End of Report ---  MARBELLA LUIS MD; Attending Radiologist  This document has been electronically signed. Dec  4 2021  4:56AM    < end of copied text >                    < from: US Kidney and Bladder (21 @ 15:10) >    EXAM:  US KIDNEYS AND BLADDER            PROCEDURE DATE:  2021    INTERPRETATION:  CLINICAL INFORMATION: Hydronephrosis    COMPARISON: Renal ultrasound dated 2019    TECHNIQUE: Sonography of the kidneys and bladder.    FINDINGS:    Right kidney: 11.2 cm. No hydronephrosis or calculi. 2.5 cm simple cyst. Parapelvic cysts.    Left kidney:  15.1 cm. Slightly degraded imaging of the left kidney. No hydronephrosis. Subcentimeter left lower pole renal cyst with adjacent 1.2 cm calcification. 5 cm left lower pole septated cyst.    Urinary bladder: No debris or calculus. Bilateral ureteral jets are visualized. Prevoid volume of approximately 336 (ml). Patient did not void at time of exam.    IMPRESSION:    No definite hydronephrosis is identified.  Bilateral ureteral jets are visualized.  Renal cysts as above    --- End of Report ---  CATRACHITA DORADO MD; Attending Radiologist  This document has been electronically signed. Dec  1 2021  3:21PM    < end of copied text >            < from: CT Abdomen and Pelvis No Cont (21 @ 08:04) >    EXAM:  CT ABDOMEN AND PELVIS            PROCEDURE DATE:  2021    INTERPRETATION:  CLINICAL STATEMENT: Diffuse abdominal pain.    TECHNIQUE: Contiguous axial CT images were obtained from the lower chest to the pubic symphysis without intravenous contrast.  Oral contrast was not administered.  Reformatted images in the coronal and sagittal planes were acquired.    COMPARISON CT: 2005.    FINDINGS:    LOWER CHEST: Mild bibasilar subsegmental atelectasis.    HEPATOBILIARY: Cholelithiasis.    SPLEEN: Unremarkable.    PANCREAS: Unremarkable.    ADRENAL GLANDS: Unremarkable.    KIDNEYS: Mild left perinephric stranding and proximal hydronephroureter extending to the level of an obstructing 5 x 5 x 5 mm left proximal ureteralcalculus (approximately 1080 Hounsfield units) (601/28.) An additional 2 mm calculus is noted within the left proximal ureter just proximal to the obstructing stone. A 4 mm nonobstructing left renal lower pole calculus is noted. Multiple bilateral renal cortical and parapelvic cysts are noted.    ABDOMINOPELVIC NODES: Unremarkable.    PELVIC ORGANS: Enlarged prostate gland with coarse calcifications.    PERITONEUM/MESENTERY/BOWEL: Sigmoid colonic diverticulosis, without evidence of diverticulitis. No evidence of bowel obstruction. No free intraperitoneal air.    BONES/SOFT TISSUES: Degenerative changes of the spine. No acute osseous abnormality.    OTHER: Atherosclerotic vascular calcification.      IMPRESSION:    1. Mild left perinephric stranding and proximal hydronephroureter extending to the level of an obstructing 5 x 5 x 5 mm left proximal ureteral calculus (approximately 1080 Hounsfield units); and additional 2 mm calculus is noted within the left proximal ureter just proximal to the obstructing stone.    2. Nonobstructing 4 mm left renal lower pole calculus.    3. Cholelithiasis.    4. Sigmoid colonic diverticulosis, without evidence of diverticulitis.    5. Enlarged prostate gland with coarse calcifications.    --- End of Report ---    VICKI GALEANA MD; Attending Radiologist  This document has been electronically signed. 2021  8:13AM    < end of copied text >   Urology Consult Note: 81 year old male with pmh of CKD, HTN, high cholesterol, PPM (on ASA 325mg), BPH presents to the ED with abdominal pain for the past 5 days. Pt follows with Dr. Botello as outpatient for management of his BPH. Patient has presented to the ED multiple times for abdominal pain and was found on 21 to have mild left perinephric stranding and proximal hydronephroureter extending to the level of an obstructing 5 x 5 x 5 mm left proximal ureteral calculus (approximately 1080 Hounsfield units); and additional 2 mm calculus is noted within the left proximal ureter just proximal to the obstructing stone. Nonobstructing 4 mm left renal lower pole calculus. Cholelithiasis, sigmoid colonic diverticulosis, without evidence of diverticulitis and an enlarged prostate gland with coarse calcifications. At that time, patient's pain was controlled and patient was discharged with  follow up on 21. Patient returned to the ED the next day complaining of abdominal pain and RBUS showed resolution of patient's mild left hydronephroureter and patient was told to keep his appointment with Dr. Botello the next day and was subsequently discharged. Patient presented to his outpatient  appointment and was discharged on flomax for continued trial of passage of stone. Patient returned today complaining of abdominal pain and noted that he has not moved his bowels for the past 5 days.  consulted for further evaluation what repeat CTAP on 21 revealed persistent mild left hydronephrosis with interval migration of previously described two left ureteral calculi, now both adjacent within left distal ureter, measuring up to 0.6 x 0.5 x 0.4 cm distally and 0.3 x 0.2 x 0.2 cm proximally. Previously seen nonobstructing left renal calculi not identified. Patient seen and examined with wife at bedside in the ED. Patient reports resolution of his pain with pain medication. Patient denies fever, chills, flank pain, dysuria, hematuria, or difficulty voiding.      PAST MEDICAL & SURGICAL HISTORY:  CKD  HTN  PPM   HLD  BPH    No significant past surgical history    [ x ] A 10 Point Review of Systems was negative except where noted    MEDICATIONS  (STANDING):  ondansetron Injectable 4 milliGRAM(s) IV Push once    Allergies: No Known Allergies    SOCIAL HISTORY: No illicit drug use    Vital Signs Last 24 Hrs  T(C): 36.7 (04 Dec 2021 01:09), Max: 36.7 (04 Dec 2021 01:09)  T(F): 98.1 (04 Dec 2021 01:09), Max: 98.1 (04 Dec 2021 01:09)  HR: 75 (04 Dec 2021 01:09) (75 - 75)  BP: 155/74 (04 Dec 2021 01:09) (155/74 - 155/74)  RR: 19 (04 Dec 2021 01:09) (19 - 19)  SpO2: 97% (04 Dec 2021 01:09) (97% - 97%)    PHYSICAL EXAM:  Constitutional: NAD, well-developed, well nourished, in good spirits  HEENT: NC/AT  Back: No CVA ttp bilaterally  Resp: No accessory respiratory muscle use  Abd: Soft, NT/ND. +mild tenderness to mid/periumbilical region to LLQ of the abdomen  Cardio: +S1/S2  : No suprapubic ttp  Ext: No edema or cyanosis, GARRETT x 4  Neuro: Awake and alert  Psych: Normal mood, normal affect  Skin: Good color, non diaphoretic    LABS:                      12.2   9.78  )-----------( 190      ( 04 Dec 2021 02:15 )             38.6     12-04  136  |  99  |  34<H>  ----------------------------<  134<H>  4.8   |  21  |  2.0<H>    Ca    9.5      04 Dec 2021 02:15  TPro  6.7  /  Alb  4.1  /  TBili  0.6  /  DBili  x   /  AST  16  /  ALT  15  /  AlkPhos  54  12-04    Urinalysis Basic - ( 04 Dec 2021 01:55 )  Color: Light Yellow / Appearance: Clear / S.011 / pH: x  Gluc: x / Ketone: Negative  / Bili: Negative / Urobili: <2 mg/dL   Blood: x / Protein: Negative / Nitrite: Negative   Leuk Esterase: Small / RBC: 6 /HPF / WBC 4 /HPF   Sq Epi: x / Non Sq Epi: 1 /HPF / Bacteria: Negative    RADIOLOGY & ADDITIONAL STUDIES:  < from: CT Abdomen and Pelvis No Cont (21 @ 04:36) >    EXAM:  CT ABDOMEN AND PELVIS            PROCEDURE DATE:  2021      INTERPRETATION:  CLINICAL STATEMENT: Left flank pain.    TECHNIQUE: Contiguous axial CT images were obtained from the lower chest to the pubic symphysis without intravenous contrast.  Oral contrast was not administered.  Reformatted images in the coronal and sagittal planes were acquired.    Comparison made with CT abdomen and pelvis 2021.    FINDINGS:    LOWER CHEST: Dependent atelectasis. Cardiac pacing device.    HEPATOBILIARY: Cholelithiasis.. Unenhanced liver unremarkable. No biliary dilation.    SPLEEN: Unremarkable.    PANCREAS: Unremarkable.    ADRENAL GLANDS: Unremarkable.    KIDNEYS: Persistent mild left hydronephrosis with interval migration of previously described two left ureteral calculi, now both adjacent within left distal ureter, measuring up to 0.6 x 0.5 x 0.4 cm distally (average Hounsfield units 1400) and 0.3 x 0.2 x 0.2 cm proximally. Bilateral renal cysts. No right hydronephrosis. Previously seen nonobstructing left renal calculi not identified.    ABDOMINOPELVIC NODES: Unremarkable.    PELVIC ORGANS: BPH. Urinary bladder unremarkable.    PERITONEUM/MESENTERY/BOWEL: Colonic diverticulosis. No bowel obstruction. No ascites.    BONES/SOFT TISSUES: Degenerative change, lumbar spine.    OTHER: Vascular calcifications. Fat-containing umbilical hernia.      IMPRESSION:  Since 2021:    1. Persistent mild left hydronephrosis with interval migration of previously described two left ureteral calculi, now both adjacent within left distal ureter, measuring up to 0.6 x 0.5 x 0.4 cm distally and 0.3 x 0.2 x 0.2 cm proximally.    2. Previously seen nonobstructing left renal calculi not identified.    --- End of Report ---  MARBELLA LUIS MD; Attending Radiologist  This document has been electronically signed. Dec  4 2021  4:56AM    < end of copied text >                    < from: US Kidney and Bladder (21 @ 15:10) >    EXAM:  US KIDNEYS AND BLADDER            PROCEDURE DATE:  2021    INTERPRETATION:  CLINICAL INFORMATION: Hydronephrosis    COMPARISON: Renal ultrasound dated 2019    TECHNIQUE: Sonography of the kidneys and bladder.    FINDINGS:    Right kidney: 11.2 cm. No hydronephrosis or calculi. 2.5 cm simple cyst. Parapelvic cysts.    Left kidney:  15.1 cm. Slightly degraded imaging of the left kidney. No hydronephrosis. Subcentimeter left lower pole renal cyst with adjacent 1.2 cm calcification. 5 cm left lower pole septated cyst.    Urinary bladder: No debris or calculus. Bilateral ureteral jets are visualized. Prevoid volume of approximately 336 (ml). Patient did not void at time of exam.    IMPRESSION:    No definite hydronephrosis is identified.  Bilateral ureteral jets are visualized.  Renal cysts as above    --- End of Report ---  CATRACHITA DORADO MD; Attending Radiologist  This document has been electronically signed. Dec  1 2021  3:21PM    < end of copied text >            < from: CT Abdomen and Pelvis No Cont (21 @ 08:04) >    EXAM:  CT ABDOMEN AND PELVIS            PROCEDURE DATE:  2021    INTERPRETATION:  CLINICAL STATEMENT: Diffuse abdominal pain.    TECHNIQUE: Contiguous axial CT images were obtained from the lower chest to the pubic symphysis without intravenous contrast.  Oral contrast was not administered.  Reformatted images in the coronal and sagittal planes were acquired.    COMPARISON CT: 2005.    FINDINGS:    LOWER CHEST: Mild bibasilar subsegmental atelectasis.    HEPATOBILIARY: Cholelithiasis.    SPLEEN: Unremarkable.    PANCREAS: Unremarkable.    ADRENAL GLANDS: Unremarkable.    KIDNEYS: Mild left perinephric stranding and proximal hydronephroureter extending to the level of an obstructing 5 x 5 x 5 mm left proximal ureteralcalculus (approximately 1080 Hounsfield units) (601/28.) An additional 2 mm calculus is noted within the left proximal ureter just proximal to the obstructing stone. A 4 mm nonobstructing left renal lower pole calculus is noted. Multiple bilateral renal cortical and parapelvic cysts are noted.    ABDOMINOPELVIC NODES: Unremarkable.    PELVIC ORGANS: Enlarged prostate gland with coarse calcifications.    PERITONEUM/MESENTERY/BOWEL: Sigmoid colonic diverticulosis, without evidence of diverticulitis. No evidence of bowel obstruction. No free intraperitoneal air.    BONES/SOFT TISSUES: Degenerative changes of the spine. No acute osseous abnormality.    OTHER: Atherosclerotic vascular calcification.      IMPRESSION:    1. Mild left perinephric stranding and proximal hydronephroureter extending to the level of an obstructing 5 x 5 x 5 mm left proximal ureteral calculus (approximately 1080 Hounsfield units); and additional 2 mm calculus is noted within the left proximal ureter just proximal to the obstructing stone.    2. Nonobstructing 4 mm left renal lower pole calculus.    3. Cholelithiasis.    4. Sigmoid colonic diverticulosis, without evidence of diverticulitis.    5. Enlarged prostate gland with coarse calcifications.    --- End of Report ---    VICKI GALEANA MD; Attending Radiologist  This document has been electronically signed. 2021  8:13AM    < end of copied text >

## 2021-12-04 NOTE — ED PROVIDER NOTE - ATTENDING CONTRIBUTION TO CARE
Patient is c/o abdominal pain x 5 days, Lt flank and generalized periumbilical abd pain, associated with nausea. Patient was in ED, was diagnosed with kidney stones and was sent home to follow up as outpatient. Patient had been to urologist, here c/o continued abdominal pain with nausea, also has not been eating well due to the pain and nausea. Patient has been feeling constipated, had been straining to move bowels, had only small amount of the stool came. patient denies rectal pain, denies cp/sob. Denies f/c/rash. Patient feels his abdomen is distended.   Vitals reviewed.   Patient is awake, alert, answering questions appropriately, appears uncomfortable, but not in distress.  Lungs: CTA, no wheezing, no crackles.  Abd: +BS, +generalized periumbilical tenderness, mild Patient is c/o abdominal pain x 5 days, Lt flank and generalized periumbilical abd pain, associated with nausea. Patient was in ED, was diagnosed with kidney stones and was sent home to follow up as outpatient. Patient had been to urologist, here c/o continued abdominal pain with nausea, also has not been eating well due to the pain and nausea. Patient has been feeling constipated, had been straining to move bowels, had only small amount of the stool came. patient denies rectal pain, denies cp/sob. Denies f/c/rash. Patient feels his abdomen is distended.   Vitals reviewed.   Patient is awake, alert, answering questions appropriately, appears uncomfortable, but not in distress.  Lungs: CTA, no wheezing, no crackles.  Abd: +BS, +generalized periumbilical tenderness, mild distension, soft, no rebound, no guarding.  CNS: awake, alert, answering questions appropriately, moving all 4 ext.   A/P: Abdominal pain/nausea/constipation,   decreased PO intake,   labs, symptomatic treatment,   imaging, reevaluation.

## 2021-12-04 NOTE — H&P ADULT - NSICDXPASTMEDICALHX_GEN_ALL_CORE_FT
PAST MEDICAL HISTORY:  CAD (coronary artery disease)     History of BPH     HTN (hypertension)     Pacemaker     Stage 3 chronic kidney disease

## 2021-12-04 NOTE — CONSULT NOTE ADULT - ASSESSMENT
81 year old male with pmh of CKD, HTN, high cholesterol, PPM (on ASA 325mg), BPH presents to the ED with abdominal pain for the past 5 days - s/p multiple ED visits for abdominal pain, was found on 11/30/21 to have mild left perinephric stranding and proximal hydronephroureter extending to the level of an obstructing 5 x 5 x 5 mm left proximal ureteral calculus. RBUS on 12/1/21 showing b/l jets with no hydronephrosis b/l. Patient s/p outpatient  appointment on 12/2/21 and was discharged on flomax for continued trial of passage of stone - returns today complaining of abdominal pain and noted that he has not moved his bowels for the past 5 days. Repeat CTAP on 12/4/21 revealed persistent mild left hydronephrosis with interval migration of previously described two left ureteral calculi, now both adjacent within left distal ureter, measuring up to 0.6 x 0.5 x 0.4 cm distally and 0.3 x 0.2 x 0.2 cm proximally. Previously seen nonobstructing left renal calculi not identified. Patient reports resolution of his pain with pain medication.    Assessment:  Kidney stone, mild hydronephrosis on CTAP  MANUEL 2.2>2.1>2.0  Constipation  Abdominal pain    Plan:  - No acute surgical/ intervention indicated at this time - patient not amenable to stent placement at this time, wishes to try to pass stone on own  - Recommend admit for pain control and trial of passage and for further workup of abdominal pain/constipation.   - Patient also stating he wishes to have lithotripsy done for stone if possible as opposed to stent placement.  - Continue flomax  - Continue pain control prn  - Continue antiemetics prn  - Encourage increased hydration  - Encourage increased ambulation  - UA negative  - Recommend strain all urine to catch the stone if it passes so that stone may be sent for analysis   - Will discuss with attending  81 year old male with pmh of CKD, HTN, high cholesterol, PPM (on ASA 325mg), BPH presents to the ED with abdominal pain for the past 5 days - s/p multiple ED visits for abdominal pain, was found on 11/30/21 to have mild left perinephric stranding and proximal hydronephroureter extending to the level of an obstructing 5 x 5 x 5 mm left proximal ureteral calculus. RBUS on 12/1/21 showing b/l jets with no hydronephrosis b/l. Patient s/p outpatient  appointment on 12/2/21 and was discharged on flomax for continued trial of passage of stone - returns today complaining of abdominal pain and noted that he has not moved his bowels for the past 5 days. Repeat CTAP on 12/4/21 revealed persistent mild left hydronephrosis with interval migration of previously described two left ureteral calculi, now both adjacent within left distal ureter, measuring up to 0.6 x 0.5 x 0.4 cm distally and 0.3 x 0.2 x 0.2 cm proximally. Previously seen nonobstructing left renal calculi not identified. Patient reports resolution of his pain with pain medication.    Assessment:  Kidney stone, mild hydronephrosis on CTAP  MANUEL 2.2>2.1>2.0  Constipation  Abdominal pain    Plan:  - No acute surgical/ intervention indicated at this time - patient not amenable to stent placement at this time, wishes to try to pass stone on own  - Recommend admit for pain control and trial of passage and for further workup of abdominal pain/constipation.   - Patient also stating he wishes to have lithotripsy done for stone as outpatient if possible as opposed to stent placement.  - Continue flomax  - Continue pain control prn  - Continue antiemetics prn  - Encourage increased hydration  - Encourage increased ambulation  - UA negative  - Recommend strain all urine to catch the stone if it passes so that stone may be sent for analysis   - Will discuss with attending  81 year old male with pmh of CKD, HTN, high cholesterol, PPM (on ASA 325mg), BPH presents to the ED with abdominal pain for the past 5 days - s/p multiple ED visits for abdominal pain, was found on 11/30/21 to have mild left perinephric stranding and proximal hydronephroureter extending to the level of an obstructing 5 x 5 x 5 mm left proximal ureteral calculus. RBUS on 12/1/21 showing b/l jets with no hydronephrosis b/l. Patient s/p outpatient  appointment on 12/2/21 and was discharged on flomax for continued trial of passage of stone - returns today complaining of abdominal pain and noted that he has not moved his bowels for the past 5 days. Repeat CTAP on 12/4/21 revealed persistent mild left hydronephrosis with interval migration of previously described two left ureteral calculi, now both adjacent within left distal ureter, measuring up to 0.6 x 0.5 x 0.4 cm distally and 0.3 x 0.2 x 0.2 cm proximally. Previously seen nonobstructing left renal calculi not identified. Patient reports resolution of his pain with pain medication.    Assessment:  Kidney stone, mild hydronephrosis on CTAP  MANUEL 2.2>2.1>2.0  Constipation  Abdominal pain    Plan:  - No acute surgical/ intervention indicated at this time - patient not amenable to stent placement at this time, wishes to try to pass stone on own  - Recommend admit for pain control and trial of passage and for further workup of abdominal pain/constipation.   - Patient also stating he wishes to have lithotripsy done for stone as outpatient if possible as opposed to stent placement.  - Continue flomax  - Continue pain control prn  - Continue antiemetics prn  - Encourage increased hydration  - Encourage increased ambulation  - UA negative  - Recommend strain all urine to catch the stone if it passes so that stone may be sent for analysis   - Will discuss with attending       ADDENDUM:  - pt seen and examined with Dr. Oro during morning rounds   - pt cleared from urologic standpoint to d/c home for trial of passage once constipation addressed, pt agrees with plan  - obtain KUB prior to discharge home   - pt is to follow-up with Dr. Botello as outpatient for further stone management   - no further acute urologic intervention at this time

## 2021-12-04 NOTE — H&P ADULT - NSHPLABSRESULTS_GEN_ALL_CORE
( @ 02:15)                      12.2  9.78 )-----------( 190                 38.6    Neutrophils = 8.15 (83.4%)  Lymphocytes = 0.92 (9.4%)  Eosinophils = 0.01 (0.1%)  Basophils = 0.01 (0.1%)  Monocytes = 0.66 (6.7%)  Bands = --%        136  |  99  |  34<H>  ----------------------------<  134<H>  4.8   |  21  |  2.0<H>    Ca    9.5      04 Dec 2021 02:15    TPro  6.7  /  Alb  4.1  /  TBili  0.6  /  DBili  x   /  AST  16  /  ALT  15  /  AlkPhos  54            RVP:          Tox:         Urinalysis Basic - ( 04 Dec 2021 01:55 )    Color: Light Yellow / Appearance: Clear / S.011 / pH: x  Gluc: x / Ketone: Negative  / Bili: Negative / Urobili: <2 mg/dL   Blood: x / Protein: Negative / Nitrite: Negative   Leuk Esterase: Small / RBC: 6 /HPF / WBC 4 /HPF   Sq Epi: x / Non Sq Epi: 1 /HPF / Bacteria: Negative

## 2021-12-04 NOTE — ED PROVIDER NOTE - CARE PLAN
1 Principal Discharge DX:	Flank pain   Principal Discharge DX:	Flank pain  Secondary Diagnosis:	Kidney stones

## 2021-12-04 NOTE — CONSULT NOTE ADULT - ATTENDING COMMENTS
I was physically present for the key portions of the evaluation and management [ E/M] service provided and agree with the plan which i have reviewed and edited where appropriate     A= left distal ureteral stone -- asymptomatic     =mild hydronephrosis , left

## 2021-12-04 NOTE — H&P ADULT - ASSESSMENT
81 year old male with pmh of CKD, CAD s/p PCI, HTN, high cholesterol, PPM for unknown reason (on ASA 325mg. Patient said he was on eliquis in the past but he had bleeding and was switched to aspiring high dose), BPH, presents to the ED with abdominal pain for the past 5 days, found to have kidney stone.    # Kidney stone, mild hydronephrosis on CTAP  MANUEL 2.2>2.1>2.0  Constipation  Abdominal pain    Plan:  - No acute surgical/ intervention indicated at this time - patient not amenable to stent placement at this time, wishes to try to pass stone on own  - Recommend admit for pain control and trial of passage and for further workup of abdominal pain/constipation.   - Patient also stating he wishes to have lithotripsy done for stone as outpatient if possible as opposed to stent placement.  - Continue flomax  - Continue pain control prn  - Continue antiemetics prn  - Encourage increased hydration  - Encourage increased ambulation  - UA negative  - Recommend strain all urine to catch the stone if it passes so that stone may be sent for analysis   - Will discuss with attending  81 year old male with pmh of CKD, CAD s/p PCI, HTN, high cholesterol, PPM for unknown reason (on ASA 325mg. Patient said he was on eliquis in the past but he had bleeding and was switched to aspiring high dose), BPH, presents to the ED with abdominal pain for the past 5 days, found to have kidney stone.    # Kidney stone, mild hydronephrosis on CTAP  - CT abdomen: Persistent mild left hydronephrosis with interval migration of previously described two left ureteral calculi, now both adjacent within left distal ureter, measuring up to 0.6 x 0.5 x 0.4 cm distally and 0.3 x 0.2 x 0.2 cm proximally.  - Urology on board. Plan as below:  - No acute surgical/ intervention indicated at this time - patient not amenable to stent placement at this time, wishes to try to pass stone on own  - Recommend admit for pain control and trial of passage and for further workup of abdominal pain/constipation.   - Patient also stating he wishes to have lithotripsy done for stone as outpatient if possible as opposed to stent placement.  - Continue flomax and finasteride  - Continue pain control prn  - Continue antiemetics prn  - Encourage increased hydration  - Encourage increased ambulation  - UA negative  - Recommend strain all urine to catch the stone if it passes so that stone may be sent for analysis     # Constipation  - likely opoid induced  - miralax and senna    # CKD 3  - Cr at baseline with mild MANUEL  - continue hydration    # CAD s/p PCI  - on aspirin and BB    # Pacemaker  - unknown reason    # DL  - continue atorva and fenofibrate    # DVT proph: heparin  # GI proph: not needed  # Activity: as tolerated  # Diet: dash  # Dispo: acute. admit to floor

## 2021-12-04 NOTE — ED PROVIDER NOTE - DATE/TIME 3
What Type Of Note Output Would You Prefer (Optional)?: Standard Output How Severe Is Your Skin Lesion?: moderate Has Your Skin Lesion Been Treated?: not been treated Is This A New Presentation, Or A Follow-Up?: Growth 04-Dec-2021 05:50

## 2021-12-05 LAB
ALBUMIN SERPL ELPH-MCNC: 3.3 G/DL — LOW (ref 3.5–5.2)
ALP SERPL-CCNC: 51 U/L — SIGNIFICANT CHANGE UP (ref 30–115)
ALT FLD-CCNC: 13 U/L — SIGNIFICANT CHANGE UP (ref 0–41)
ANION GAP SERPL CALC-SCNC: 15 MMOL/L — HIGH (ref 7–14)
AST SERPL-CCNC: 14 U/L — SIGNIFICANT CHANGE UP (ref 0–41)
BASOPHILS # BLD AUTO: 0.01 K/UL — SIGNIFICANT CHANGE UP (ref 0–0.2)
BASOPHILS NFR BLD AUTO: 0.2 % — SIGNIFICANT CHANGE UP (ref 0–1)
BILIRUB SERPL-MCNC: 0.6 MG/DL — SIGNIFICANT CHANGE UP (ref 0.2–1.2)
BUN SERPL-MCNC: 37 MG/DL — HIGH (ref 10–20)
CALCIUM SERPL-MCNC: 8.6 MG/DL — SIGNIFICANT CHANGE UP (ref 8.5–10.1)
CHLORIDE SERPL-SCNC: 101 MMOL/L — SIGNIFICANT CHANGE UP (ref 98–110)
CO2 SERPL-SCNC: 19 MMOL/L — SIGNIFICANT CHANGE UP (ref 17–32)
CREAT SERPL-MCNC: 2.2 MG/DL — HIGH (ref 0.7–1.5)
EOSINOPHIL # BLD AUTO: 0.04 K/UL — SIGNIFICANT CHANGE UP (ref 0–0.7)
EOSINOPHIL NFR BLD AUTO: 0.6 % — SIGNIFICANT CHANGE UP (ref 0–8)
GLUCOSE SERPL-MCNC: 108 MG/DL — HIGH (ref 70–99)
HCT VFR BLD CALC: 33.5 % — LOW (ref 42–52)
HGB BLD-MCNC: 10.7 G/DL — LOW (ref 14–18)
IMM GRANULOCYTES NFR BLD AUTO: 0.2 % — SIGNIFICANT CHANGE UP (ref 0.1–0.3)
LYMPHOCYTES # BLD AUTO: 0.9 K/UL — LOW (ref 1.2–3.4)
LYMPHOCYTES # BLD AUTO: 14 % — LOW (ref 20.5–51.1)
MAGNESIUM SERPL-MCNC: 1.8 MG/DL — SIGNIFICANT CHANGE UP (ref 1.8–2.4)
MCHC RBC-ENTMCNC: 27 PG — SIGNIFICANT CHANGE UP (ref 27–31)
MCHC RBC-ENTMCNC: 31.9 G/DL — LOW (ref 32–37)
MCV RBC AUTO: 84.6 FL — SIGNIFICANT CHANGE UP (ref 80–94)
MONOCYTES # BLD AUTO: 0.7 K/UL — HIGH (ref 0.1–0.6)
MONOCYTES NFR BLD AUTO: 10.9 % — HIGH (ref 1.7–9.3)
NEUTROPHILS # BLD AUTO: 4.78 K/UL — SIGNIFICANT CHANGE UP (ref 1.4–6.5)
NEUTROPHILS NFR BLD AUTO: 74.1 % — SIGNIFICANT CHANGE UP (ref 42.2–75.2)
NRBC # BLD: 0 /100 WBCS — SIGNIFICANT CHANGE UP (ref 0–0)
PHOSPHATE SERPL-MCNC: 3.4 MG/DL — SIGNIFICANT CHANGE UP (ref 2.1–4.9)
PLATELET # BLD AUTO: 158 K/UL — SIGNIFICANT CHANGE UP (ref 130–400)
POTASSIUM SERPL-MCNC: 4.7 MMOL/L — SIGNIFICANT CHANGE UP (ref 3.5–5)
POTASSIUM SERPL-SCNC: 4.7 MMOL/L — SIGNIFICANT CHANGE UP (ref 3.5–5)
PROT SERPL-MCNC: 5.5 G/DL — LOW (ref 6–8)
RBC # BLD: 3.96 M/UL — LOW (ref 4.7–6.1)
RBC # FLD: 13.2 % — SIGNIFICANT CHANGE UP (ref 11.5–14.5)
SODIUM SERPL-SCNC: 135 MMOL/L — SIGNIFICANT CHANGE UP (ref 135–146)
WBC # BLD: 6.44 K/UL — SIGNIFICANT CHANGE UP (ref 4.8–10.8)
WBC # FLD AUTO: 6.44 K/UL — SIGNIFICANT CHANGE UP (ref 4.8–10.8)

## 2021-12-05 PROCEDURE — 99232 SBSQ HOSP IP/OBS MODERATE 35: CPT

## 2021-12-05 RX ORDER — HYDROMORPHONE HYDROCHLORIDE 2 MG/ML
0.5 INJECTION INTRAMUSCULAR; INTRAVENOUS; SUBCUTANEOUS EVERY 4 HOURS
Refills: 0 | Status: DISCONTINUED | OUTPATIENT
Start: 2021-12-05 | End: 2021-12-06

## 2021-12-05 RX ORDER — HYDROMORPHONE HYDROCHLORIDE 2 MG/ML
2 INJECTION INTRAMUSCULAR; INTRAVENOUS; SUBCUTANEOUS EVERY 6 HOURS
Refills: 0 | Status: DISCONTINUED | OUTPATIENT
Start: 2021-12-05 | End: 2021-12-05

## 2021-12-05 RX ORDER — HYDROMORPHONE HYDROCHLORIDE 2 MG/ML
2 INJECTION INTRAMUSCULAR; INTRAVENOUS; SUBCUTANEOUS EVERY 6 HOURS
Refills: 0 | Status: DISCONTINUED | OUTPATIENT
Start: 2021-12-05 | End: 2021-12-06

## 2021-12-05 RX ADMIN — HEPARIN SODIUM 5000 UNIT(S): 5000 INJECTION INTRAVENOUS; SUBCUTANEOUS at 05:21

## 2021-12-05 RX ADMIN — SENNA PLUS 2 TABLET(S): 8.6 TABLET ORAL at 21:50

## 2021-12-05 RX ADMIN — FINASTERIDE 5 MILLIGRAM(S): 5 TABLET, FILM COATED ORAL at 12:32

## 2021-12-05 RX ADMIN — ONDANSETRON 4 MILLIGRAM(S): 8 TABLET, FILM COATED ORAL at 08:53

## 2021-12-05 RX ADMIN — HEPARIN SODIUM 5000 UNIT(S): 5000 INJECTION INTRAVENOUS; SUBCUTANEOUS at 17:24

## 2021-12-05 RX ADMIN — ATORVASTATIN CALCIUM 40 MILLIGRAM(S): 80 TABLET, FILM COATED ORAL at 21:50

## 2021-12-05 RX ADMIN — Medication 325 MILLIGRAM(S): at 12:32

## 2021-12-05 RX ADMIN — HYDROMORPHONE HYDROCHLORIDE 0.5 MILLIGRAM(S): 2 INJECTION INTRAMUSCULAR; INTRAVENOUS; SUBCUTANEOUS at 19:38

## 2021-12-05 RX ADMIN — Medication 48 MILLIGRAM(S): at 13:30

## 2021-12-05 RX ADMIN — Medication 10 MILLIGRAM(S): at 12:33

## 2021-12-05 RX ADMIN — TAMSULOSIN HYDROCHLORIDE 0.4 MILLIGRAM(S): 0.4 CAPSULE ORAL at 21:51

## 2021-12-05 RX ADMIN — HYDROMORPHONE HYDROCHLORIDE 0.5 MILLIGRAM(S): 2 INJECTION INTRAMUSCULAR; INTRAVENOUS; SUBCUTANEOUS at 20:08

## 2021-12-05 RX ADMIN — CARVEDILOL PHOSPHATE 3.12 MILLIGRAM(S): 80 CAPSULE, EXTENDED RELEASE ORAL at 05:21

## 2021-12-05 RX ADMIN — POLYETHYLENE GLYCOL 3350 17 GRAM(S): 17 POWDER, FOR SOLUTION ORAL at 12:32

## 2021-12-05 RX ADMIN — HYDROMORPHONE HYDROCHLORIDE 0.5 MILLIGRAM(S): 2 INJECTION INTRAMUSCULAR; INTRAVENOUS; SUBCUTANEOUS at 00:07

## 2021-12-05 NOTE — PROGRESS NOTE ADULT - ASSESSMENT
81 year old male with PMH of HTN, CAD s/p PCI, ICD and CKD stage 3-4 and BPH came to ED for abdominal pain for the last 5 days, this is his 3rd visit since 11/30, when his pain started, he was found with left proximal ureteral stone and recommended with hydration and pain control, he denies fever, chills or vomiting, still unable to pass the urine. In the ED, patient was HD stable. labs showed no new abnormalities.  consulted for further evaluation what repeat CTAP on 12/4/21 revealed persistent mild left hydronephrosis with interval migration of previously described two left ureteral calculi, now both adjacent within left distal ureter, measuring up to 0.6 x 0.5 x 0.4 cm distally and 0.3 x 0.2 x 0.2 cm proximally.      A/P:   Left nephrolithiasis with mild hydronephrosis:   CT abdomen and Pelvis: showed  revealed persistent mild left hydronephrosis with interval migration of previously described two left ureteral calculi, now both adjacent within left distal ureter, measuring up to 0.6 x 0.5 x 0.4 cm distally and 0.3 x 0.2 x 0.2 cm proximally.  Urology recommended iV fluid, pain management, urine strain.   UA negative for UTI  Pain control. Started on Flomax.     CKD stage 4  Mild Left hydronephrosis.   Cr 2.0 stable.     Chronic HfrEF: s/p AICD  stable  Continue Metoprolol.     CAD s/p PCI  Continue ASA, Lipitor and Metoprolol .    #Progress Note Handoff:  Pending (specify):    Family discussion:  Disposition: Home. 81 year old male with PMH of HTN, CAD s/p PCI, ICD and CKD stage 3-4 and BPH came to ED for abdominal pain for the last 5 days, this is his 3rd visit since 11/30, when his pain started, he was found with left proximal ureteral stone and recommended with hydration and pain control, he denies fever, chills or vomiting, still unable to pass the urine. In the ED, patient was HD stable. labs showed no new abnormalities.  consulted for further evaluation what repeat CTAP on 12/4/21 revealed persistent mild left hydronephrosis with interval migration of previously described two left ureteral calculi, now both adjacent within left distal ureter, measuring up to 0.6 x 0.5 x 0.4 cm distally and 0.3 x 0.2 x 0.2 cm proximally.    A/P:   Left nephrolithiasis with mild hydronephrosis:   CT abdomen and Pelvis: showed  revealed persistent mild left hydronephrosis with interval migration of previously described two left ureteral calculi, now both adjacent within left distal ureter, measuring up to 0.6 x 0.5 x 0.4 cm distally and 0.3 x 0.2 x 0.2 cm proximally.  Urology recommended iV fluid, pain management, urine strain.   UA negative for UTI  Pain control. Started on Flomax.     Constipation:   Continue bowel regimen, add Dulcolax suppositories, if no improvement will give   CKD stage 4  Mild Left hydronephrosis.   Cr 2.0 stable.     Chronic HFrEF: s/p AICD  stable  Continue Metoprolol.     CAD s/p PCI  Continue ASA, Lipitor and Metoprolol .    #Progress Note Handoff:  Pending (specify):  improving abdominal pain, urine strain for kidney stone.   Family discussion:  Disposition: Home. 81 year old male with PMH of HTN, CAD s/p PCI, ICD and CKD stage 3-4 and BPH came to ED for abdominal pain for the last 5 days, this is his 3rd visit since 11/30, when his pain started, he was found with left proximal ureteral stone and recommended with hydration and pain control, he denies fever, chills or vomiting, still unable to pass the urine. In the ED, patient was HD stable. labs showed no new abnormalities.  consulted for further evaluation what repeat CTAP on 12/4/21 revealed persistent mild left hydronephrosis with interval migration of previously described two left ureteral calculi, now both adjacent within left distal ureter, measuring up to 0.6 x 0.5 x 0.4 cm distally and 0.3 x 0.2 x 0.2 cm proximally.    A/P:   Left nephrolithiasis with mild hydronephrosis:   CT abdomen and Pelvis: showed  revealed persistent mild left hydronephrosis with interval migration of previously described two left ureteral calculi, now both adjacent within left distal ureter, measuring up to 0.6 x 0.5 x 0.4 cm distally and 0.3 x 0.2 x 0.2 cm proximally.  Urology recommended iV fluid, pain management, urine strain.   UA negative for UTI  Pain control. Started on Flomax.     Constipation:   Continue bowel regimen, add Dulcolax suppositories, if no improvement will give   CKD stage 4  Mild Left hydronephrosis.   Cr 2.0 stable.     s/p AICD, patient doesn't know the reason, possible primary prevention for HFrEF vs arrhythmia,   stable, follow up with EP.   Continue Metoprolol.     CAD s/p PCI  Continue ASA, Lipitor and Metoprolol .    #Progress Note Handoff:  Pending (specify):  improving abdominal pain, urine strain for kidney stone.   Family discussion:  Disposition: Home.

## 2021-12-05 NOTE — PROGRESS NOTE ADULT - SUBJECTIVE AND OBJECTIVE BOX
JANETTE SAUL  81y  Male      Patient is a 81y old  Male who presents with a chief complaint of abd pain (04 Dec 2021 11:03)      INTERVAL HPI/OVERNIGHT EVENTS:  He feels better.   Vital Signs Last 24 Hrs  T(C): 37.4 (05 Dec 2021 08:00), Max: 37.7 (05 Dec 2021 00:00)  T(F): 99.4 (05 Dec 2021 08:00), Max: 99.9 (05 Dec 2021 00:00)  HR: 76 (05 Dec 2021 08:00) (72 - 83)  BP: 149/77 (05 Dec 2021 08:00) (147/71 - 160/78)  BP(mean): --  RR: 19 (05 Dec 2021 08:00) (18 - 19)  SpO2: --      21 @ 07:01  -  21 @ 07:00  --------------------------------------------------------  IN: 1350 mL / OUT: 300 mL / NET: 1050 mL    21 @ 07:01  -  21 @ 09:39  --------------------------------------------------------  IN: 0 mL / OUT: 400 mL / NET: -400 mL            Consultant(s) Notes Reviewed:  [x ] YES  [ ] NO          MEDICATIONS  (STANDING):  aspirin enteric coated 325 milliGRAM(s) Oral daily  atorvastatin 40 milliGRAM(s) Oral at bedtime  carvedilol 3.125 milliGRAM(s) Oral daily  fenofibrate Tablet 48 milliGRAM(s) Oral daily  finasteride 5 milliGRAM(s) Oral daily  heparin   Injectable 5000 Unit(s) SubCutaneous every 12 hours  lactated ringers. 1000 milliLiter(s) (75 mL/Hr) IV Continuous <Continuous>  ondansetron Injectable 4 milliGRAM(s) IV Push once  polyethylene glycol 3350 17 Gram(s) Oral daily  senna 2 Tablet(s) Oral at bedtime  tamsulosin 0.4 milliGRAM(s) Oral at bedtime    MEDICATIONS  (PRN):  acetaminophen     Tablet .. 650 milliGRAM(s) Oral every 6 hours PRN Mild Pain (1 - 3)  HYDROmorphone   Tablet 2 milliGRAM(s) Oral every 6 hours PRN Moderate Pain (4 - 6)  ondansetron Injectable 4 milliGRAM(s) IV Push every 8 hours PRN Nausea and/or Vomiting      LABS                          10.7   6.44  )-----------( 158      ( 05 Dec 2021 05:30 )             33.5     12-    135  |  101  |  37<H>  ----------------------------<  108<H>  4.7   |  19  |  2.2<H>    Ca    8.6      05 Dec 2021 05:30  Phos  3.4     12-  Mg     1.8         TPro  5.5<L>  /  Alb  3.3<L>  /  TBili  0.6  /  DBili  x   /  AST  14  /  ALT  13  /  AlkPhos  51  12-      Urinalysis Basic - ( 04 Dec 2021 01:55 )    Color: Light Yellow / Appearance: Clear / S.011 / pH: x  Gluc: x / Ketone: Negative  / Bili: Negative / Urobili: <2 mg/dL   Blood: x / Protein: Negative / Nitrite: Negative   Leuk Esterase: Small / RBC: 6 /HPF / WBC 4 /HPF   Sq Epi: x / Non Sq Epi: 1 /HPF / Bacteria: Negative      PT/INR - ( 04 Dec 2021 18:21 )   PT: 12.50 sec;   INR: 1.09 ratio         PTT - ( 04 Dec 2021 18:21 )  PTT:25.7 sec  Lactate Trend   @ 02:15 Lactate:1.2    @ 16:34 Lactate:1.5         CAPILLARY BLOOD GLUCOSE          Culture - Urine (collected 21 @ 13:51)  Source: Clean Catch Clean Catch (Midstream)  Final Report (21 @ 07:37):    <10,000 CFU/mL Normal Urogenital Gabriella    Culture - Urine (collected 21 @ 08:50)  Source: Clean Catch Clean Catch (Midstream)  Final Report (21 @ 16:08):    <10,000 CFU/mL Normal Urogenital Gabriella        RADIOLOGY & ADDITIONAL TESTS:    Imaging Personally Reviewed:  [ ] YES  [ ] NO    HEALTH ISSUES - PROBLEM Dx:          PHYSICAL EXAM:  GENERAL: NAD, well-developed.  HEAD:  Atraumatic, Normocephalic.  EYES: EOMI, PERRLA, conjunctiva and sclera clear.  NECK: Supple, No JVD.  CHEST/LUNG: Clear to auscultation bilaterally; No wheeze.  HEART: Regular rate and rhythm; S1 S2.   ABDOMEN: Soft, Nontender, Nondistended; Bowel sounds present.  EXTREMITIES:  2+ Peripheral Pulses, No clubbing, cyanosis, or edema.  PSYCH: AAOx3.  NEUROLOGY: non-focal.  SKIN: No rashes or lesions.   JANETTE SAUL  81y  Male      Patient is a 81y old  Male who presents with a chief complaint of abd pain (04 Dec 2021 11:03)      INTERVAL HPI/OVERNIGHT EVENTS:  He is still with abdominal pain, also he is constipated, no bowel movement for 7 days.   Vital Signs Last 24 Hrs  T(C): 37.4 (05 Dec 2021 08:00), Max: 37.7 (05 Dec 2021 00:00)  T(F): 99.4 (05 Dec 2021 08:00), Max: 99.9 (05 Dec 2021 00:00)  HR: 76 (05 Dec 2021 08:00) (72 - 83)  BP: 149/77 (05 Dec 2021 08:00) (147/71 - 160/78)  BP(mean): --  RR: 19 (05 Dec 2021 08:00) (18 - 19)  SpO2: --      21 @ 07:01  -  21 @ 07:00  --------------------------------------------------------  IN: 1350 mL / OUT: 300 mL / NET: 1050 mL    21 @ 07:01  -  21 @ 09:39  --------------------------------------------------------  IN: 0 mL / OUT: 400 mL / NET: -400 mL            Consultant(s) Notes Reviewed:  [x ] YES  [ ] NO          MEDICATIONS  (STANDING):  aspirin enteric coated 325 milliGRAM(s) Oral daily  atorvastatin 40 milliGRAM(s) Oral at bedtime  carvedilol 3.125 milliGRAM(s) Oral daily  fenofibrate Tablet 48 milliGRAM(s) Oral daily  finasteride 5 milliGRAM(s) Oral daily  heparin   Injectable 5000 Unit(s) SubCutaneous every 12 hours  lactated ringers. 1000 milliLiter(s) (75 mL/Hr) IV Continuous <Continuous>  ondansetron Injectable 4 milliGRAM(s) IV Push once  polyethylene glycol 3350 17 Gram(s) Oral daily  senna 2 Tablet(s) Oral at bedtime  tamsulosin 0.4 milliGRAM(s) Oral at bedtime    MEDICATIONS  (PRN):  acetaminophen     Tablet .. 650 milliGRAM(s) Oral every 6 hours PRN Mild Pain (1 - 3)  HYDROmorphone   Tablet 2 milliGRAM(s) Oral every 6 hours PRN Moderate Pain (4 - 6)  ondansetron Injectable 4 milliGRAM(s) IV Push every 8 hours PRN Nausea and/or Vomiting      LABS                          10.7   6.44  )-----------( 158      ( 05 Dec 2021 05:30 )             33.5     12    135  |  101  |  37<H>  ----------------------------<  108<H>  4.7   |  19  |  2.2<H>    Ca    8.6      05 Dec 2021 05:30  Phos  3.4       Mg     1.8         TPro  5.5<L>  /  Alb  3.3<L>  /  TBili  0.6  /  DBili  x   /  AST  14  /  ALT  13  /  AlkPhos  51  12      Urinalysis Basic - ( 04 Dec 2021 01:55 )    Color: Light Yellow / Appearance: Clear / S.011 / pH: x  Gluc: x / Ketone: Negative  / Bili: Negative / Urobili: <2 mg/dL   Blood: x / Protein: Negative / Nitrite: Negative   Leuk Esterase: Small / RBC: 6 /HPF / WBC 4 /HPF   Sq Epi: x / Non Sq Epi: 1 /HPF / Bacteria: Negative      PT/INR - ( 04 Dec 2021 18:21 )   PT: 12.50 sec;   INR: 1.09 ratio         PTT - ( 04 Dec 2021 18:21 )  PTT:25.7 sec  Lactate Trend   @ 02:15 Lactate:1.2    @ 16:34 Lactate:1.5         CAPILLARY BLOOD GLUCOSE          Culture - Urine (collected 21 @ 13:51)  Source: Clean Catch Clean Catch (Midstream)  Final Report (21 @ 07:37):    <10,000 CFU/mL Normal Urogenital Gabriella    Culture - Urine (collected 21 @ 08:50)  Source: Clean Catch Clean Catch (Midstream)  Final Report (21 @ 16:08):    <10,000 CFU/mL Normal Urogenital Gabriella        RADIOLOGY & ADDITIONAL TESTS:    Imaging Personally Reviewed:  [ ] YES  [ ] NO    HEALTH ISSUES - PROBLEM Dx:          PHYSICAL EXAM:  GENERAL: NAD, well-developed.  HEAD:  Atraumatic, Normocephalic.  EYES: EOMI, PERRLA, conjunctiva and sclera clear.  NECK: Supple, No JVD.  CHEST/LUNG: Clear to auscultation bilaterally; No wheeze.  HEART: Regular rate and rhythm; S1 S2.   ABDOMEN: Soft, Nontender, Nondistended; Bowel sounds present.  EXTREMITIES:  2+ Peripheral Pulses, No clubbing, cyanosis, or edema.  PSYCH: AAOx3.  NEUROLOGY: non-focal.  SKIN: No rashes or lesions.

## 2021-12-06 LAB
ALBUMIN SERPL ELPH-MCNC: 3.7 G/DL — SIGNIFICANT CHANGE UP (ref 3.5–5.2)
ALP SERPL-CCNC: 62 U/L — SIGNIFICANT CHANGE UP (ref 30–115)
ALT FLD-CCNC: 13 U/L — SIGNIFICANT CHANGE UP (ref 0–41)
ANION GAP SERPL CALC-SCNC: 18 MMOL/L — HIGH (ref 7–14)
AST SERPL-CCNC: 14 U/L — SIGNIFICANT CHANGE UP (ref 0–41)
BASOPHILS # BLD AUTO: 0.02 K/UL — SIGNIFICANT CHANGE UP (ref 0–0.2)
BASOPHILS NFR BLD AUTO: 0.2 % — SIGNIFICANT CHANGE UP (ref 0–1)
BILIRUB SERPL-MCNC: 0.7 MG/DL — SIGNIFICANT CHANGE UP (ref 0.2–1.2)
BUN SERPL-MCNC: 34 MG/DL — HIGH (ref 10–20)
CALCIUM SERPL-MCNC: 9.3 MG/DL — SIGNIFICANT CHANGE UP (ref 8.5–10.1)
CHLORIDE SERPL-SCNC: 96 MMOL/L — LOW (ref 98–110)
CO2 SERPL-SCNC: 21 MMOL/L — SIGNIFICANT CHANGE UP (ref 17–32)
CREAT SERPL-MCNC: 2 MG/DL — HIGH (ref 0.7–1.5)
EOSINOPHIL # BLD AUTO: 0.03 K/UL — SIGNIFICANT CHANGE UP (ref 0–0.7)
EOSINOPHIL NFR BLD AUTO: 0.4 % — SIGNIFICANT CHANGE UP (ref 0–8)
GLUCOSE SERPL-MCNC: 106 MG/DL — HIGH (ref 70–99)
HCT VFR BLD CALC: 35.9 % — LOW (ref 42–52)
HGB BLD-MCNC: 11.5 G/DL — LOW (ref 14–18)
IMM GRANULOCYTES NFR BLD AUTO: 0.2 % — SIGNIFICANT CHANGE UP (ref 0.1–0.3)
LYMPHOCYTES # BLD AUTO: 0.84 K/UL — LOW (ref 1.2–3.4)
LYMPHOCYTES # BLD AUTO: 10 % — LOW (ref 20.5–51.1)
MAGNESIUM SERPL-MCNC: 1.9 MG/DL — SIGNIFICANT CHANGE UP (ref 1.8–2.4)
MCHC RBC-ENTMCNC: 27.1 PG — SIGNIFICANT CHANGE UP (ref 27–31)
MCHC RBC-ENTMCNC: 32 G/DL — SIGNIFICANT CHANGE UP (ref 32–37)
MCV RBC AUTO: 84.5 FL — SIGNIFICANT CHANGE UP (ref 80–94)
MONOCYTES # BLD AUTO: 0.84 K/UL — HIGH (ref 0.1–0.6)
MONOCYTES NFR BLD AUTO: 10 % — HIGH (ref 1.7–9.3)
NEUTROPHILS # BLD AUTO: 6.66 K/UL — HIGH (ref 1.4–6.5)
NEUTROPHILS NFR BLD AUTO: 79.2 % — HIGH (ref 42.2–75.2)
NRBC # BLD: 0 /100 WBCS — SIGNIFICANT CHANGE UP (ref 0–0)
PLATELET # BLD AUTO: 220 K/UL — SIGNIFICANT CHANGE UP (ref 130–400)
POTASSIUM SERPL-MCNC: 4.8 MMOL/L — SIGNIFICANT CHANGE UP (ref 3.5–5)
POTASSIUM SERPL-SCNC: 4.8 MMOL/L — SIGNIFICANT CHANGE UP (ref 3.5–5)
PROT SERPL-MCNC: 6.3 G/DL — SIGNIFICANT CHANGE UP (ref 6–8)
RBC # BLD: 4.25 M/UL — LOW (ref 4.7–6.1)
RBC # FLD: 13.1 % — SIGNIFICANT CHANGE UP (ref 11.5–14.5)
SODIUM SERPL-SCNC: 135 MMOL/L — SIGNIFICANT CHANGE UP (ref 135–146)
WBC # BLD: 8.41 K/UL — SIGNIFICANT CHANGE UP (ref 4.8–10.8)
WBC # FLD AUTO: 8.41 K/UL — SIGNIFICANT CHANGE UP (ref 4.8–10.8)

## 2021-12-06 PROCEDURE — 99232 SBSQ HOSP IP/OBS MODERATE 35: CPT

## 2021-12-06 RX ORDER — LACTULOSE 10 G/15ML
10 SOLUTION ORAL THREE TIMES A DAY
Refills: 0 | Status: DISCONTINUED | OUTPATIENT
Start: 2021-12-06 | End: 2021-12-07

## 2021-12-06 RX ORDER — HYDROMORPHONE HYDROCHLORIDE 2 MG/ML
2 INJECTION INTRAMUSCULAR; INTRAVENOUS; SUBCUTANEOUS EVERY 4 HOURS
Refills: 0 | Status: DISCONTINUED | OUTPATIENT
Start: 2021-12-06 | End: 2021-12-09

## 2021-12-06 RX ORDER — ASPIRIN/CALCIUM CARB/MAGNESIUM 324 MG
81 TABLET ORAL DAILY
Refills: 0 | Status: DISCONTINUED | OUTPATIENT
Start: 2021-12-06 | End: 2021-12-09

## 2021-12-06 RX ADMIN — TAMSULOSIN HYDROCHLORIDE 0.4 MILLIGRAM(S): 0.4 CAPSULE ORAL at 22:07

## 2021-12-06 RX ADMIN — HEPARIN SODIUM 5000 UNIT(S): 5000 INJECTION INTRAVENOUS; SUBCUTANEOUS at 05:40

## 2021-12-06 RX ADMIN — CARVEDILOL PHOSPHATE 3.12 MILLIGRAM(S): 80 CAPSULE, EXTENDED RELEASE ORAL at 05:40

## 2021-12-06 RX ADMIN — HYDROMORPHONE HYDROCHLORIDE 2 MILLIGRAM(S): 2 INJECTION INTRAMUSCULAR; INTRAVENOUS; SUBCUTANEOUS at 08:52

## 2021-12-06 RX ADMIN — Medication 81 MILLIGRAM(S): at 13:05

## 2021-12-06 RX ADMIN — ATORVASTATIN CALCIUM 40 MILLIGRAM(S): 80 TABLET, FILM COATED ORAL at 22:06

## 2021-12-06 RX ADMIN — HEPARIN SODIUM 5000 UNIT(S): 5000 INJECTION INTRAVENOUS; SUBCUTANEOUS at 17:45

## 2021-12-06 RX ADMIN — LACTULOSE 10 GRAM(S): 10 SOLUTION ORAL at 16:23

## 2021-12-06 RX ADMIN — POLYETHYLENE GLYCOL 3350 17 GRAM(S): 17 POWDER, FOR SOLUTION ORAL at 13:06

## 2021-12-06 RX ADMIN — FINASTERIDE 5 MILLIGRAM(S): 5 TABLET, FILM COATED ORAL at 13:05

## 2021-12-06 RX ADMIN — SENNA PLUS 2 TABLET(S): 8.6 TABLET ORAL at 22:06

## 2021-12-06 RX ADMIN — Medication 48 MILLIGRAM(S): at 13:05

## 2021-12-06 NOTE — PROGRESS NOTE ADULT - SUBJECTIVE AND OBJECTIVE BOX
Patient is a 81y old  Male who presents with a chief complaint of abd pain (06 Dec 2021 11:00)      Patient seen and examined at bedside.    ALLERGIES:  No Known Allergies    MEDICATIONS:  acetaminophen     Tablet .. 650 milliGRAM(s) Oral every 6 hours PRN  aspirin enteric coated 81 milliGRAM(s) Oral daily  atorvastatin 40 milliGRAM(s) Oral at bedtime  carvedilol 3.125 milliGRAM(s) Oral daily  fenofibrate Tablet 48 milliGRAM(s) Oral daily  finasteride 5 milliGRAM(s) Oral daily  heparin   Injectable 5000 Unit(s) SubCutaneous every 12 hours  HYDROmorphone   Tablet 2 milliGRAM(s) Oral every 4 hours PRN  lactated ringers. 1000 milliLiter(s) IV Continuous <Continuous>  ondansetron Injectable 4 milliGRAM(s) IV Push every 8 hours PRN  polyethylene glycol 3350 17 Gram(s) Oral daily  senna 2 Tablet(s) Oral at bedtime  tamsulosin 0.4 milliGRAM(s) Oral at bedtime    Vital Signs Last 24 Hrs  T(F): 99.8 (06 Dec 2021 08:00), Max: 99.8 (06 Dec 2021 08:00)  HR: 78 (06 Dec 2021 04:49) (69 - 78)  BP: 160/77 (06 Dec 2021 08:00) (150/71 - 162/84)  RR: 18 (06 Dec 2021 08:00) (18 - 18)  SpO2: 97% (05 Dec 2021 19:21) (97% - 97%)  I&O's Summary    05 Dec 2021 07:01  -  06 Dec 2021 07:00  --------------------------------------------------------  IN: 0 mL / OUT: 400 mL / NET: -400 mL        PHYSICAL EXAM:  General: NAD, A/O x 3  ENT: MMM  Neck: Supple, No JVD  Lungs: Clear to auscultation bilaterally  Cardio: RRR, S1/S2, 2/6 systolic murmur   Abdomen: Soft, Nontender, Nondistended; Bowel sounds present  Extremities: No cyanosis, No edema    LABS:                        11.5   8.41  )-----------( 220      ( 06 Dec 2021 06:38 )             35.9         135  |  96  |  34  ----------------------------<  106  4.8   |  21  |  2.0    Ca    9.3      06 Dec 2021 06:38  Phos  3.4       Mg     1.9         TPro  6.3  /  Alb  3.7  /  TBili  0.7  /  DBili  x   /  AST  14  /  ALT  13  /  AlkPhos  62      eGFR if Non African American: 30 mL/min/1.73M2 (21 @ 06:38)  eGFR if African American: 35 mL/min/1.73M2 (21 @ 06:38)    PT/INR - ( 04 Dec 2021 18:21 )   PT: 12.50 sec;   INR: 1.09 ratio         PTT - ( 04 Dec 2021 18:21 )  PTT:25.7 sec  Lactate, Blood: 1.2 mmol/L ( @ 02:15)                          Urinalysis Basic - ( 04 Dec 2021 01:55 )    Color: Light Yellow / Appearance: Clear / S.011 / pH: x  Gluc: x / Ketone: Negative  / Bili: Negative / Urobili: <2 mg/dL   Blood: x / Protein: Negative / Nitrite: Negative   Leuk Esterase: Small / RBC: 6 /HPF / WBC 4 /HPF   Sq Epi: x / Non Sq Epi: 1 /HPF / Bacteria: Negative        Culture - Urine (collected 01 Dec 2021 13:51)  Source: Clean Catch Clean Catch (Midstream)  Final Report (03 Dec 2021 07:37):    <10,000 CFU/mL Normal Urogenital Gabriella    Culture - Urine (collected 2021 08:50)  Source: Clean Catch Clean Catch (Midstream)  Final Report (01 Dec 2021 16:08):    <10,000 CFU/mL Normal Urogenital Gabriella      COVID-19 PCR: NotDetec (21 @ 05:35)      RADIOLOGY & ADDITIONAL TESTS:    Care Discussed with Consultants/Other Providers:

## 2021-12-06 NOTE — PROGRESS NOTE ADULT - SUBJECTIVE AND OBJECTIVE BOX
SUBJECTIVE:    Patient is a 81y old Male who presents with a chief complaint of abd pain (05 Dec 2021 09:36)    Currently admitted to medicine with the primary diagnosis of: abdominal pain    Today is hospital day 2d.     Overnight Events:     Pain is still present. Patient is not having an appetite due to nausea and pain. Patient has been straining his urine and has not found a stone yet. Urinating well.     PAST MEDICAL & SURGICAL HISTORY  Stage 3 chronic kidney disease    HTN (hypertension)    CAD (coronary artery disease)    History of BPH    Pacemaker    No significant past surgical history      ALLERGIES:  No Known Allergies    MEDICATIONS:  STANDING MEDICATIONS  aspirin enteric coated 325 milliGRAM(s) Oral daily  atorvastatin 40 milliGRAM(s) Oral at bedtime  carvedilol 3.125 milliGRAM(s) Oral daily  fenofibrate Tablet 48 milliGRAM(s) Oral daily  finasteride 5 milliGRAM(s) Oral daily  heparin   Injectable 5000 Unit(s) SubCutaneous every 12 hours  lactated ringers. 1000 milliLiter(s) IV Continuous <Continuous>  ondansetron Injectable 4 milliGRAM(s) IV Push once  polyethylene glycol 3350 17 Gram(s) Oral daily  senna 2 Tablet(s) Oral at bedtime  tamsulosin 0.4 milliGRAM(s) Oral at bedtime    PRN MEDICATIONS  acetaminophen     Tablet .. 650 milliGRAM(s) Oral every 6 hours PRN  HYDROmorphone   Tablet 2 milliGRAM(s) Oral every 6 hours PRN  HYDROmorphone  Injectable 0.5 milliGRAM(s) IV Push every 4 hours PRN  ondansetron Injectable 4 milliGRAM(s) IV Push every 8 hours PRN    VITALS:   ICU Vital Signs Last 24 Hrs  T(C): 37.7 (06 Dec 2021 08:00), Max: 37.7 (06 Dec 2021 08:00)  T(F): 99.8 (06 Dec 2021 08:00), Max: 99.8 (06 Dec 2021 08:00)  HR: 78 (06 Dec 2021 04:49) (69 - 78)  BP: 160/77 (06 Dec 2021 08:00) (150/71 - 162/84)  RR: 18 (06 Dec 2021 08:00) (18 - 18)  SpO2: 97% (05 Dec 2021 19:21) (97% - 97%)      LABS:                        11.5   8.41  )-----------( 220      ( 06 Dec 2021 06:38 )             35.9     12-06    135  |  96<L>  |  34<H>  ----------------------------<  106<H>  4.8   |  21  |  2.0<H>    Ca    9.3      06 Dec 2021 06:38  Phos  3.4     12-05  Mg     1.9     12-06    TPro  6.3  /  Alb  3.7  /  TBili  0.7  /  DBili  x   /  AST  14  /  ALT  13  /  AlkPhos  62  12-06    PT/INR - ( 04 Dec 2021 18:21 )   PT: 12.50 sec;   INR: 1.09 ratio         PTT - ( 04 Dec 2021 18:21 )  PTT:25.7 sec    RADIOLOGY:    < from: CT Abdomen and Pelvis No Cont (12.04.21 @ 04:36) >  IMPRESSION:  Since November 30, 2021:    1. Persistent mild left hydronephrosis with interval migration of previouslydescribed two left ureteral calculi, now both adjacent within left distal ureter, measuring up to 0.6 x 0.5 x 0.4 cm distally and 0.3 x 0.2 x 0.2 cm proximally.    2. Previously seen nonobstructing left renal calculi not identified.    --- End of Report ---    < end of copied text >      PHYSICAL EXAM:    GENERAL: anxious   CHEST/LUNG: Clear to auscultation bilaterally; No rales, rhonchi, wheezing, or rubs. Unlabored respirations  HEART: Regular rate and rhythm; No murmurs, rubs, or gallops  ABDOMEN: Bowel sounds present; Soft, Tender to palpation of the Left side., Nondistended.  EXTREMITIES:  2+ Peripheral Pulses, brisk capillary refill. No clubbing, cyanosis, or edema  NERVOUS SYSTEM:  Alert & Oriented X3, speech clear. No deficits
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
## 2021-12-06 NOTE — PROGRESS NOTE ADULT - ASSESSMENT
81 year old male with PMH of HTN, CAD s/p PCI, ICD and CKD stage 3-4 and BPH came to ED for abdominal pain for the last 5 days, this is his 3rd visit since 11/30, when his pain started, he was found with left proximal ureteral stone and recommended with hydration and pain control, he denies fever, chills or vomiting, still unable to pass the urine. In the ED, patient was HD stable. labs showed no new abnormalities.  consulted for further evaluation what repeat CTAP on 12/4/21 revealed persistent mild left hydronephrosis with interval migration of previously described two left ureteral calculi, now both adjacent within left distal ureter, measuring up to 0.6 x 0.5 x 0.4 cm distally and 0.3 x 0.2 x 0.2 cm proximally.    A/P:   Left nephrolithiasis with mild hydronephrosis:   CT abdomen and Pelvis: showed  revealed persistent mild left hydronephrosis with interval migration of previously described two left ureteral calculi, now both adjacent within left distal ureter, measuring up to 0.6 x 0.5 x 0.4 cm distally and 0.3 x 0.2 x 0.2 cm proximally.  Urology recommended iV fluid, pain management, urine strain.   UA negative for UTI  Pain control. Started on Flomax.     Constipation:   Continue bowel regimen  No significant stool burden on Ct     CKD stage 4  Mild Left hydronephrosis.   Cr 2.0 stable.     s/p AICD, patient doesn't know the reason, possible primary prevention for HFrEF vs arrhythmia,   stable, follow up with EP.   Continue Metoprolol.     CAD s/p PCI  Continue ASA, Lipitor and Metoprolol .    #Progress Note Handoff:  Pending (specify):  improving abdominal pain, urine strain for kidney stone.   Family discussion:  Disposition: Home.

## 2021-12-06 NOTE — PHYSICAL THERAPY INITIAL EVALUATION ADULT - PERTINENT HX OF CURRENT PROBLEM, REHAB EVAL
81 year old male with pmh of CKD, CAD s/p PCI, HTN, high cholesterol, PPM for unknown reason,  BPH, presents to the ED with abdominal pain for the past 5 days, found to have kidney stone. Admitted for pain control and trial of passage and for further workup of abdominal pain/constipation.

## 2021-12-06 NOTE — PROGRESS NOTE ADULT - ASSESSMENT
81 year old male with pmh of CKD, CAD s/p PCI, HTN, high cholesterol, PPM for unknown reason (on ASA 325mg. Patient said he was on eliquis in the past but he had bleeding and was switched to aspiring high dose), BPH, presents to the ED with abdominal pain for the past 5 days, found to have kidney stone.    # Kidney stone, mild hydronephrosis on CTAP  - CT abdomen: Persistent mild left hydronephrosis with interval migration of previously described two left ureteral calculi, now both adjacent within left distal ureter, measuring up to 0.6 x 0.5 x 0.4 cm distally and 0.3 x 0.2 x 0.2 cm proximally.  - Urology on board. Plan as below:  - No acute surgical/ intervention indicated at this time - patient not amenable to stent placement at this time, wishes to try to pass stone on own  - Recommend admit for pain control and trial of passage and for further workup of abdominal pain/constipation.   - Patient also stating he wishes to have lithotripsy done for stone as outpatient if possible as opposed to stent placement.  - Continue flomax and finasteride  - Continue pain control prn  - Continue antiemetics prn  - Encourage increased hydration  - Encourage increased ambulation  - UA negative  - Recommend strain all urine to catch the stone if it passes so that stone may be sent for analysis     # Constipation  - miralax and senna  -add Dulcolax suppositories,    # CKD 3  - Cr at baseline with mild MANUEL  - continue hydration    # CAD s/p PCI  - on aspirin and BB    # Pacemaker  - unknown reason    # DL  - continue atorva and fenofibrate    # DVT proph: heparin  # GI proph: not needed  # Activity: as tolerated  # Diet: dash  # Dispo: anticipate for tomorrow. FU urology. PT.

## 2021-12-06 NOTE — PHYSICAL THERAPY INITIAL EVALUATION ADULT - GENERAL OBSERVATIONS, REHAB EVAL
Chart reviewed, pt encountered seated at eob, NAD, agreeable, son present b/s, RN locked IV for session. IE completed 13:20-13:50. Supervision for bed mobility, transfers & amb, does not require Acute Rehab services at this time, recommend unit amb as tj, reconsult if status changes.

## 2021-12-07 LAB
ALBUMIN SERPL ELPH-MCNC: 3.8 G/DL — SIGNIFICANT CHANGE UP (ref 3.5–5.2)
ALP SERPL-CCNC: 69 U/L — SIGNIFICANT CHANGE UP (ref 30–115)
ALT FLD-CCNC: 15 U/L — SIGNIFICANT CHANGE UP (ref 0–41)
ANION GAP SERPL CALC-SCNC: 18 MMOL/L — HIGH (ref 7–14)
APPEARANCE UR: CLEAR — SIGNIFICANT CHANGE UP
AST SERPL-CCNC: 17 U/L — SIGNIFICANT CHANGE UP (ref 0–41)
BASOPHILS # BLD AUTO: 0.03 K/UL — SIGNIFICANT CHANGE UP (ref 0–0.2)
BASOPHILS NFR BLD AUTO: 0.4 % — SIGNIFICANT CHANGE UP (ref 0–1)
BILIRUB SERPL-MCNC: 0.7 MG/DL — SIGNIFICANT CHANGE UP (ref 0.2–1.2)
BILIRUB UR-MCNC: NEGATIVE — SIGNIFICANT CHANGE UP
BUN SERPL-MCNC: 31 MG/DL — HIGH (ref 10–20)
CALCIUM SERPL-MCNC: 9.7 MG/DL — SIGNIFICANT CHANGE UP (ref 8.5–10.1)
CHLORIDE SERPL-SCNC: 98 MMOL/L — SIGNIFICANT CHANGE UP (ref 98–110)
CO2 SERPL-SCNC: 23 MMOL/L — SIGNIFICANT CHANGE UP (ref 17–32)
COLOR SPEC: SIGNIFICANT CHANGE UP
CREAT SERPL-MCNC: 2 MG/DL — HIGH (ref 0.7–1.5)
DIFF PNL FLD: SIGNIFICANT CHANGE UP
EOSINOPHIL # BLD AUTO: 0.07 K/UL — SIGNIFICANT CHANGE UP (ref 0–0.7)
EOSINOPHIL NFR BLD AUTO: 0.9 % — SIGNIFICANT CHANGE UP (ref 0–8)
GLUCOSE SERPL-MCNC: 107 MG/DL — HIGH (ref 70–99)
GLUCOSE UR QL: NEGATIVE — SIGNIFICANT CHANGE UP
HCT VFR BLD CALC: 37.5 % — LOW (ref 42–52)
HGB BLD-MCNC: 12 G/DL — LOW (ref 14–18)
IMM GRANULOCYTES NFR BLD AUTO: 0.1 % — SIGNIFICANT CHANGE UP (ref 0.1–0.3)
KETONES UR-MCNC: NEGATIVE — SIGNIFICANT CHANGE UP
LEUKOCYTE ESTERASE UR-ACNC: NEGATIVE — SIGNIFICANT CHANGE UP
LYMPHOCYTES # BLD AUTO: 0.98 K/UL — LOW (ref 1.2–3.4)
LYMPHOCYTES # BLD AUTO: 13 % — LOW (ref 20.5–51.1)
MAGNESIUM SERPL-MCNC: 1.9 MG/DL — SIGNIFICANT CHANGE UP (ref 1.8–2.4)
MCHC RBC-ENTMCNC: 27.2 PG — SIGNIFICANT CHANGE UP (ref 27–31)
MCHC RBC-ENTMCNC: 32 G/DL — SIGNIFICANT CHANGE UP (ref 32–37)
MCV RBC AUTO: 85 FL — SIGNIFICANT CHANGE UP (ref 80–94)
MONOCYTES # BLD AUTO: 0.73 K/UL — HIGH (ref 0.1–0.6)
MONOCYTES NFR BLD AUTO: 9.7 % — HIGH (ref 1.7–9.3)
NEUTROPHILS # BLD AUTO: 5.72 K/UL — SIGNIFICANT CHANGE UP (ref 1.4–6.5)
NEUTROPHILS NFR BLD AUTO: 75.9 % — HIGH (ref 42.2–75.2)
NITRITE UR-MCNC: NEGATIVE — SIGNIFICANT CHANGE UP
NRBC # BLD: 0 /100 WBCS — SIGNIFICANT CHANGE UP (ref 0–0)
PH UR: 7 — SIGNIFICANT CHANGE UP (ref 5–8)
PLATELET # BLD AUTO: 267 K/UL — SIGNIFICANT CHANGE UP (ref 130–400)
POTASSIUM SERPL-MCNC: 4.8 MMOL/L — SIGNIFICANT CHANGE UP (ref 3.5–5)
POTASSIUM SERPL-SCNC: 4.8 MMOL/L — SIGNIFICANT CHANGE UP (ref 3.5–5)
PROT SERPL-MCNC: 6.6 G/DL — SIGNIFICANT CHANGE UP (ref 6–8)
PROT UR-MCNC: NEGATIVE — SIGNIFICANT CHANGE UP
RBC # BLD: 4.41 M/UL — LOW (ref 4.7–6.1)
RBC # FLD: 12.9 % — SIGNIFICANT CHANGE UP (ref 11.5–14.5)
SODIUM SERPL-SCNC: 139 MMOL/L — SIGNIFICANT CHANGE UP (ref 135–146)
SP GR SPEC: 1.01 — SIGNIFICANT CHANGE UP (ref 1.01–1.03)
UROBILINOGEN FLD QL: ABNORMAL
WBC # BLD: 7.54 K/UL — SIGNIFICANT CHANGE UP (ref 4.8–10.8)
WBC # FLD AUTO: 7.54 K/UL — SIGNIFICANT CHANGE UP (ref 4.8–10.8)

## 2021-12-07 PROCEDURE — 99232 SBSQ HOSP IP/OBS MODERATE 35: CPT

## 2021-12-07 RX ORDER — CEFTRIAXONE 500 MG/1
1000 INJECTION, POWDER, FOR SOLUTION INTRAMUSCULAR; INTRAVENOUS EVERY 24 HOURS
Refills: 0 | Status: DISCONTINUED | OUTPATIENT
Start: 2021-12-07 | End: 2021-12-09

## 2021-12-07 RX ADMIN — SODIUM CHLORIDE 75 MILLILITER(S): 9 INJECTION, SOLUTION INTRAVENOUS at 06:13

## 2021-12-07 RX ADMIN — Medication 48 MILLIGRAM(S): at 11:05

## 2021-12-07 RX ADMIN — TAMSULOSIN HYDROCHLORIDE 0.4 MILLIGRAM(S): 0.4 CAPSULE ORAL at 22:03

## 2021-12-07 RX ADMIN — CARVEDILOL PHOSPHATE 3.12 MILLIGRAM(S): 80 CAPSULE, EXTENDED RELEASE ORAL at 06:16

## 2021-12-07 RX ADMIN — HEPARIN SODIUM 5000 UNIT(S): 5000 INJECTION INTRAVENOUS; SUBCUTANEOUS at 17:15

## 2021-12-07 RX ADMIN — HEPARIN SODIUM 5000 UNIT(S): 5000 INJECTION INTRAVENOUS; SUBCUTANEOUS at 06:16

## 2021-12-07 RX ADMIN — HYDROMORPHONE HYDROCHLORIDE 2 MILLIGRAM(S): 2 INJECTION INTRAMUSCULAR; INTRAVENOUS; SUBCUTANEOUS at 17:46

## 2021-12-07 RX ADMIN — Medication 650 MILLIGRAM(S): at 09:00

## 2021-12-07 RX ADMIN — ATORVASTATIN CALCIUM 40 MILLIGRAM(S): 80 TABLET, FILM COATED ORAL at 22:03

## 2021-12-07 RX ADMIN — SENNA PLUS 2 TABLET(S): 8.6 TABLET ORAL at 22:03

## 2021-12-07 RX ADMIN — CEFTRIAXONE 100 MILLIGRAM(S): 500 INJECTION, POWDER, FOR SOLUTION INTRAMUSCULAR; INTRAVENOUS at 18:32

## 2021-12-07 RX ADMIN — Medication 650 MILLIGRAM(S): at 08:10

## 2021-12-07 RX ADMIN — ONDANSETRON 4 MILLIGRAM(S): 8 TABLET, FILM COATED ORAL at 18:56

## 2021-12-07 RX ADMIN — Medication 81 MILLIGRAM(S): at 11:05

## 2021-12-07 RX ADMIN — FINASTERIDE 5 MILLIGRAM(S): 5 TABLET, FILM COATED ORAL at 11:05

## 2021-12-07 RX ADMIN — HYDROMORPHONE HYDROCHLORIDE 2 MILLIGRAM(S): 2 INJECTION INTRAMUSCULAR; INTRAVENOUS; SUBCUTANEOUS at 17:16

## 2021-12-07 NOTE — PROGRESS NOTE ADULT - ASSESSMENT
81 year old male with PMH of HTN, CAD s/p PCI, ICD and CKD stage 3-4 and BPH came to ED for abdominal pain for the last 5 days, this is his 3rd visit since 11/30, when his pain started, he was found with left proximal ureteral stone and recommended with hydration and pain control, he denies fever, chills or vomiting, still unable to pass the urine. In the ED, patient was HD stable. labs showed no new abnormalities.  consulted for further evaluation what repeat CTAP on 12/4/21 revealed persistent mild left hydronephrosis with interval migration of previously described two left ureteral calculi, now both adjacent within left distal ureter, measuring up to 0.6 x 0.5 x 0.4 cm distally and 0.3 x 0.2 x 0.2 cm proximally.    Fever   12/7 developed fever  Procalcitonin, Blood cultures and urine culture resent  Started Ceftriaxone 12/7    Left nephrolithiasis with mild hydronephrosis:   CT abdomen and Pelvis: showed  revealed persistent mild left hydronephrosis with interval migration of previously described two left ureteral calculi, now both adjacent within left distal ureter, measuring up to 0.6 x 0.5 x 0.4 cm distally and 0.3 x 0.2 x 0.2 cm proximally.  Urology recommended iV fluid, pain management, urine strain.   UA negative for UTI  12/4 urine culture +  50,000 - 99,000 CFU/mL Gram positive organisms, speciation is pending   Pain control. Started on Flomax.     Constipation:   Continue bowel regimen  Large BM 12/6    CKD stage 4  Mild Left hydronephrosis.   Cr 2.0 stable.     s/p AICD, patient doesn't know the reason, possible primary prevention for HFrEF vs arrhythmia,   stable, follow up with EP.   Continue Metoprolol.     CAD s/p PCI  Continue ASA, Lipitor and Metoprolol .    #Progress Note Handoff:  Pending (specify):  improving abdominal pain, urine strain for kidney stone, Urine culture speciation, new blood cultures   Family discussion:  Disposition: Home.

## 2021-12-07 NOTE — PROGRESS NOTE ADULT - SUBJECTIVE AND OBJECTIVE BOX
Patient is a 81y old  Male who presents with a chief complaint of abd pain (06 Dec 2021 14:25)      Patient seen and examined at bedside.    ALLERGIES:  No Known Allergies    MEDICATIONS:  acetaminophen     Tablet .. 650 milliGRAM(s) Oral every 6 hours PRN  aspirin enteric coated 81 milliGRAM(s) Oral daily  atorvastatin 40 milliGRAM(s) Oral at bedtime  carvedilol 3.125 milliGRAM(s) Oral daily  cefTRIAXone   IVPB 1000 milliGRAM(s) IV Intermittent every 24 hours  fenofibrate Tablet 48 milliGRAM(s) Oral daily  finasteride 5 milliGRAM(s) Oral daily  heparin   Injectable 5000 Unit(s) SubCutaneous every 12 hours  HYDROmorphone   Tablet 2 milliGRAM(s) Oral every 4 hours PRN  lactated ringers. 1000 milliLiter(s) IV Continuous <Continuous>  ondansetron Injectable 4 milliGRAM(s) IV Push every 8 hours PRN  polyethylene glycol 3350 17 Gram(s) Oral daily  senna 2 Tablet(s) Oral at bedtime  tamsulosin 0.4 milliGRAM(s) Oral at bedtime    Vital Signs Last 24 Hrs  T(F): 96.8 (07 Dec 2021 10:54), Max: 101 (07 Dec 2021 07:26)  HR: 83 (07 Dec 2021 07:26) (76 - 83)  BP: 141/84 (07 Dec 2021 07:26) (141/84 - 153/81)  RR: 18 (07 Dec 2021 07:26) (18 - 18)  SpO2: 94% (07 Dec 2021 07:26) (94% - 94%)  I&O's Summary    06 Dec 2021 07:01  -  07 Dec 2021 07:00  --------------------------------------------------------  IN: 75 mL / OUT: 400 mL / NET: -325 mL    07 Dec 2021 07:01  -  07 Dec 2021 16:52  --------------------------------------------------------  IN: 750 mL / OUT: 500 mL / NET: 250 mL        PHYSICAL EXAM:  General: NAD, A/O x 3  ENT: MMM  Neck: Supple, No JVD  Lungs: Clear to auscultation bilaterally  Cardio: RRR, S1/S2, No murmurs  Abdomen: Soft, Nontender, Nondistended; Bowel sounds present  Extremities: No cyanosis, No edema    LABS:                        12.0   7.54  )-----------( 267      ( 07 Dec 2021 08:00 )             37.5         139  |  98  |  31  ----------------------------<  107  4.8   |  23  |  2.0    Ca    9.7      07 Dec 2021 08:00  Phos  3.4     12  Mg     1.9         TPro  6.6  /  Alb  3.8  /  TBili  0.7  /  DBili  x   /  AST  17  /  ALT  15  /  AlkPhos  69      eGFR if Non African American: 30 mL/min/1.73M2 (21 @ 08:00)  eGFR if : 35 mL/min/1.73M2 (21 @ 08:00)    PT/INR - ( 04 Dec 2021 18:21 )   PT: 12.50 sec;   INR: 1.09 ratio         PTT - ( 04 Dec 2021 18:21 )  PTT:25.7 sec                          Urinalysis Basic - ( 07 Dec 2021 10:54 )    Color: Light Yellow / Appearance: Clear / S.008 / pH: x  Gluc: x / Ketone: Negative  / Bili: Negative / Urobili: 3 mg/dL   Blood: x / Protein: Negative / Nitrite: Negative   Leuk Esterase: Negative / RBC: x / WBC x   Sq Epi: x / Non Sq Epi: x / Bacteria: x        Culture - Urine (collected 04 Dec 2021 12:46)  Source: Clean Catch Clean Catch (Midstream)  Preliminary Report (07 Dec 2021 14:58):    50,000 - 99,000 CFU/mL Gram positive organisms    Normal Urogenital surekha present    Culture - Urine (collected 01 Dec 2021 13:51)  Source: Clean Catch Clean Catch (Midstream)  Final Report (03 Dec 2021 07:37):    <10,000 CFU/mL Normal Urogenital Surekha      COVID-19 PCR: NotDetec (21 @ 05:35)      RADIOLOGY & ADDITIONAL TESTS:    Care Discussed with Consultants/Other Providers:

## 2021-12-08 ENCOUNTER — TRANSCRIPTION ENCOUNTER (OUTPATIENT)
Age: 81
End: 2021-12-08

## 2021-12-08 LAB
-  AMPICILLIN: SIGNIFICANT CHANGE UP
-  CIPROFLOXACIN: SIGNIFICANT CHANGE UP
-  LEVOFLOXACIN: SIGNIFICANT CHANGE UP
-  NITROFURANTOIN: SIGNIFICANT CHANGE UP
-  TETRACYCLINE: SIGNIFICANT CHANGE UP
-  VANCOMYCIN: SIGNIFICANT CHANGE UP
ALBUMIN SERPL ELPH-MCNC: 3.2 G/DL — LOW (ref 3.5–5.2)
ALP SERPL-CCNC: 68 U/L — SIGNIFICANT CHANGE UP (ref 30–115)
ALT FLD-CCNC: 12 U/L — SIGNIFICANT CHANGE UP (ref 0–41)
ANION GAP SERPL CALC-SCNC: 16 MMOL/L — HIGH (ref 7–14)
AST SERPL-CCNC: 13 U/L — SIGNIFICANT CHANGE UP (ref 0–41)
BASOPHILS # BLD AUTO: 0.02 K/UL — SIGNIFICANT CHANGE UP (ref 0–0.2)
BASOPHILS NFR BLD AUTO: 0.3 % — SIGNIFICANT CHANGE UP (ref 0–1)
BILIRUB SERPL-MCNC: 0.4 MG/DL — SIGNIFICANT CHANGE UP (ref 0.2–1.2)
BUN SERPL-MCNC: 30 MG/DL — HIGH (ref 10–20)
CALCIUM SERPL-MCNC: 9 MG/DL — SIGNIFICANT CHANGE UP (ref 8.5–10.1)
CHLORIDE SERPL-SCNC: 99 MMOL/L — SIGNIFICANT CHANGE UP (ref 98–110)
CO2 SERPL-SCNC: 22 MMOL/L — SIGNIFICANT CHANGE UP (ref 17–32)
CREAT SERPL-MCNC: 1.9 MG/DL — HIGH (ref 0.7–1.5)
CULTURE RESULTS: SIGNIFICANT CHANGE UP
EOSINOPHIL # BLD AUTO: 0.17 K/UL — SIGNIFICANT CHANGE UP (ref 0–0.7)
EOSINOPHIL NFR BLD AUTO: 2.3 % — SIGNIFICANT CHANGE UP (ref 0–8)
GLUCOSE SERPL-MCNC: 103 MG/DL — HIGH (ref 70–99)
HCT VFR BLD CALC: 34.1 % — LOW (ref 42–52)
HGB BLD-MCNC: 10.6 G/DL — LOW (ref 14–18)
IMM GRANULOCYTES NFR BLD AUTO: 0.3 % — SIGNIFICANT CHANGE UP (ref 0.1–0.3)
LYMPHOCYTES # BLD AUTO: 1.21 K/UL — SIGNIFICANT CHANGE UP (ref 1.2–3.4)
LYMPHOCYTES # BLD AUTO: 16.5 % — LOW (ref 20.5–51.1)
MAGNESIUM SERPL-MCNC: 1.8 MG/DL — SIGNIFICANT CHANGE UP (ref 1.8–2.4)
MCHC RBC-ENTMCNC: 26.4 PG — LOW (ref 27–31)
MCHC RBC-ENTMCNC: 31.1 G/DL — LOW (ref 32–37)
MCV RBC AUTO: 84.8 FL — SIGNIFICANT CHANGE UP (ref 80–94)
METHOD TYPE: SIGNIFICANT CHANGE UP
MONOCYTES # BLD AUTO: 0.77 K/UL — HIGH (ref 0.1–0.6)
MONOCYTES NFR BLD AUTO: 10.5 % — HIGH (ref 1.7–9.3)
NEUTROPHILS # BLD AUTO: 5.14 K/UL — SIGNIFICANT CHANGE UP (ref 1.4–6.5)
NEUTROPHILS NFR BLD AUTO: 70.1 % — SIGNIFICANT CHANGE UP (ref 42.2–75.2)
NRBC # BLD: 0 /100 WBCS — SIGNIFICANT CHANGE UP (ref 0–0)
ORGANISM # SPEC MICROSCOPIC CNT: SIGNIFICANT CHANGE UP
ORGANISM # SPEC MICROSCOPIC CNT: SIGNIFICANT CHANGE UP
PLATELET # BLD AUTO: 273 K/UL — SIGNIFICANT CHANGE UP (ref 130–400)
POTASSIUM SERPL-MCNC: 4.8 MMOL/L — SIGNIFICANT CHANGE UP (ref 3.5–5)
POTASSIUM SERPL-SCNC: 4.8 MMOL/L — SIGNIFICANT CHANGE UP (ref 3.5–5)
PROCALCITONIN SERPL-MCNC: 0.09 NG/ML — SIGNIFICANT CHANGE UP (ref 0.02–0.1)
PROT SERPL-MCNC: 5.7 G/DL — LOW (ref 6–8)
RBC # BLD: 4.02 M/UL — LOW (ref 4.7–6.1)
RBC # FLD: 12.8 % — SIGNIFICANT CHANGE UP (ref 11.5–14.5)
SODIUM SERPL-SCNC: 137 MMOL/L — SIGNIFICANT CHANGE UP (ref 135–146)
SPECIMEN SOURCE: SIGNIFICANT CHANGE UP
WBC # BLD: 7.33 K/UL — SIGNIFICANT CHANGE UP (ref 4.8–10.8)
WBC # FLD AUTO: 7.33 K/UL — SIGNIFICANT CHANGE UP (ref 4.8–10.8)

## 2021-12-08 PROCEDURE — 99232 SBSQ HOSP IP/OBS MODERATE 35: CPT

## 2021-12-08 RX ORDER — LEVOFLOXACIN 5 MG/ML
1 INJECTION, SOLUTION INTRAVENOUS
Qty: 5 | Refills: 0
Start: 2021-12-08 | End: 2021-12-12

## 2021-12-08 RX ORDER — SENNA PLUS 8.6 MG/1
2 TABLET ORAL
Qty: 60 | Refills: 0
Start: 2021-12-08 | End: 2022-01-06

## 2021-12-08 RX ADMIN — SENNA PLUS 2 TABLET(S): 8.6 TABLET ORAL at 21:13

## 2021-12-08 RX ADMIN — CEFTRIAXONE 100 MILLIGRAM(S): 500 INJECTION, POWDER, FOR SOLUTION INTRAMUSCULAR; INTRAVENOUS at 19:29

## 2021-12-08 RX ADMIN — ONDANSETRON 4 MILLIGRAM(S): 8 TABLET, FILM COATED ORAL at 15:53

## 2021-12-08 RX ADMIN — HYDROMORPHONE HYDROCHLORIDE 2 MILLIGRAM(S): 2 INJECTION INTRAMUSCULAR; INTRAVENOUS; SUBCUTANEOUS at 01:33

## 2021-12-08 RX ADMIN — HYDROMORPHONE HYDROCHLORIDE 2 MILLIGRAM(S): 2 INJECTION INTRAMUSCULAR; INTRAVENOUS; SUBCUTANEOUS at 01:03

## 2021-12-08 RX ADMIN — HEPARIN SODIUM 5000 UNIT(S): 5000 INJECTION INTRAVENOUS; SUBCUTANEOUS at 05:05

## 2021-12-08 RX ADMIN — FINASTERIDE 5 MILLIGRAM(S): 5 TABLET, FILM COATED ORAL at 11:51

## 2021-12-08 RX ADMIN — POLYETHYLENE GLYCOL 3350 17 GRAM(S): 17 POWDER, FOR SOLUTION ORAL at 11:52

## 2021-12-08 RX ADMIN — ATORVASTATIN CALCIUM 40 MILLIGRAM(S): 80 TABLET, FILM COATED ORAL at 21:13

## 2021-12-08 RX ADMIN — Medication 48 MILLIGRAM(S): at 11:51

## 2021-12-08 RX ADMIN — CARVEDILOL PHOSPHATE 3.12 MILLIGRAM(S): 80 CAPSULE, EXTENDED RELEASE ORAL at 05:05

## 2021-12-08 RX ADMIN — Medication 81 MILLIGRAM(S): at 11:52

## 2021-12-08 RX ADMIN — HEPARIN SODIUM 5000 UNIT(S): 5000 INJECTION INTRAVENOUS; SUBCUTANEOUS at 17:04

## 2021-12-08 RX ADMIN — TAMSULOSIN HYDROCHLORIDE 0.4 MILLIGRAM(S): 0.4 CAPSULE ORAL at 21:13

## 2021-12-08 NOTE — DIETITIAN INITIAL EVALUATION ADULT. - OTHER CALCULATIONS
using ABW 70.3kg; Energy: 1338-1606kcal (MSJ 1.0-1.2 AF); protein: 70-77g/day (1-1.1/kg) -- CKD3 considered, fx improving, will adjust PRN; Fluid: 1mL/kcal or LIP

## 2021-12-08 NOTE — DIETITIAN INITIAL EVALUATION ADULT. - ORAL INTAKE PTA/DIET HISTORY
Pt reports poor PO appetite Pt reports poor PO/appetite for past 7-8 days, d/t pain because of the kidney stones. the pain has been really bad, which is causing him to eat less. However, prior to all this, he always eats really well, reports eating 2-3 meals/day. Reports eating a heart-healthy diet and exercising everyday. UBW: 68.1- 70.9kg vs. wt at admit: 70.3kg -- in range. No wt loss reported by pt himself either. NKFA. Takes multivitamins, Vit E and garlic tablets. No cul/rel or personal food rest/prefs.

## 2021-12-08 NOTE — DISCHARGE NOTE PROVIDER - CARE PROVIDER_API CALL
Lupillo Boo)  Internal Medicine  [address] [place] [address]  Phone: [phone]  Fax: [phone]  Follow Up Time: 2 weeks    Aris Botello)  Urology  [address] [place] [address], [address]  [place] [address]  Phone: [phone]  Fax: [phone]  Follow Up Time: 1 week

## 2021-12-08 NOTE — DISCHARGE NOTE PROVIDER - CARE PROVIDERS DIRECT ADDRESSES
[email or web address]

## 2021-12-08 NOTE — DISCHARGE NOTE PROVIDER - HOSPITAL COURSE
81 year old male with pmh of CKD, CAD s/p PCI, HTN, high cholesterol, PPM for unknown reason (on ASA 325mg. Patient said he was on eliquis in the past but he had bleeding and was switched to aspiring high dose), BPH, presents to the ED with abdominal pain for the past 5 days. Pt follows with Dr. Botello as outpatient for management of his BPH. Patient has presented to the ED multiple times for abdominal pain and was found on 11/30/21 to have mild left perinephric stranding and proximal hydronephroureter extending to the level of an obstructing 5 x 5 x 5 mm left proximal ureteral calculus; and additional 2 mm calculus is noted within the left proximal ureter just proximal to the obstructing stone. Nonobstructing 4 mm left renal lower pole calculus. Cholelithiasis, sigmoid colonic diverticulosis, without evidence of diverticulitis and an enlarged prostate gland with coarse calcifications. At that time, patient's pain was controlled and patient was discharged with  follow up on 12/1/21. Patient followed with Dr. Botello the next day and was discharged on flomax for continued trial of passage of stone. Patient returned today complaining of abdominal pain and noted that he has not moved his bowels for the past 5 days. Patient reports pain is left sided and radiates to the mid lower abdomen. Pain is severe and stabbing in nature. he reports no fever or chills. no dysuria, frequency, hematuria, or passage of any stone. no flank pain. Patient also reports some nausea and was about to vomit.    In the ED, patient was HD stable. labs showed no new abnormalities.  consulted for further evaluation what repeat CTAP on 12/4/21 revealed persistent mild left hydronephrosis with interval migration of previously described two left ureteral calculi, now both adjacent within left distal ureter, measuring up to 0.6 x 0.5 x 0.4 cm distally and 0.3 x 0.2 x 0.2 cm proximally. Previously seen non obstructing left renal calculi not identified. Patient seen and examined with wife at bedside in the ED. Patient reports resolution of his pain with pain medication.     Patient treated for UTI and Left nephrolithiasis with mild hydronephrosis. Received fluids and ceftriaxone and Flomax . Will follow up with Urology outpatient. discharge on levofloxacin or culture targeted    81 year old male with pmh of CKD, CAD s/p PCI, HTN, high cholesterol, PPM for unknown reason (on ASA 325mg. Patient said he was on eliquis in the past but he had bleeding and was switched to aspiring high dose), BPH, presents to the ED with abdominal pain for the past 5 days. Pt follows with Dr. Botello as outpatient for management of his BPH. Patient has presented to the ED multiple times for abdominal pain and was found on 11/30/21 to have mild left perinephric stranding and proximal hydronephroureter extending to the level of an obstructing 5 x 5 x 5 mm left proximal ureteral calculus; and additional 2 mm calculus is noted within the left proximal ureter just proximal to the obstructing stone. Nonobstructing 4 mm left renal lower pole calculus. Cholelithiasis, sigmoid colonic diverticulosis, without evidence of diverticulitis and an enlarged prostate gland with coarse calcifications. At that time, patient's pain was controlled and patient was discharged with  follow up on 12/1/21. Patient followed with Dr. Botello the next day and was discharged on flomax for continued trial of passage of stone. Patient returned today complaining of abdominal pain and noted that he has not moved his bowels for the past 5 days. Patient reports pain is left sided and radiates to the mid lower abdomen. Pain is severe and stabbing in nature. he reports no fever or chills. no dysuria, frequency, hematuria, or passage of any stone. no flank pain. Patient also reports some nausea and was about to vomit.    In the ED, patient was HD stable. labs showed no new abnormalities.  consulted for further evaluation what repeat CTAP on 12/4/21 revealed persistent mild left hydronephrosis with interval migration of previously described two left ureteral calculi, now both adjacent within left distal ureter, measuring up to 0.6 x 0.5 x 0.4 cm distally and 0.3 x 0.2 x 0.2 cm proximally. Previously seen non obstructing left renal calculi not identified. Patient seen and examined with wife at bedside in the ED. Patient reports resolution of his pain with pain medication.     Patient treated for UTI and Left nephrolithiasis with mild hydronephrosis. Received fluids and ceftriaxone and Flomax . Will follow up with Urology outpatient. discharge on levofloxacin or culture targeted     Hospitalist addendum: pt does not have complaints except some mild pain in lower and LLQ abdomen. States pain improved a lot after BM.     Physical exam:  NAD,   RESP: CTA b/l, no crackles, rhonchi  CVS: S1S2,RRR  GI: abdomen soft NT, ND  Extremities: no edema  NEURO: AOx3, no focal deficit    chart, labs, images reviewed    Will treat for UTI with Levaquin. continue bowel regimen.     Pt is clinically stable for discharge home today. I discussed with son at bedside, he agreed with the plan.

## 2021-12-08 NOTE — DIETITIAN INITIAL EVALUATION ADULT. - OTHER INFO
pertinent medical information:   --81 year old male with PMH of HTN, CAD s/p PCI, ICD and CKD stage 3-4 and BPH came to ED for abdominal pain for the last 5 days, this is his 3rd visit since 11/30, when his pain started, he was found with left proximal ureteral stone.   #Left nephrolithiasis with mild hydronephrosis  -- consulted for further evaluation what repeat CTAP on 12/4/21 revealed persistent mild left hydronephrosis with interval migration of previously described two left ureteral calculi, now both adjacent within left distal ureter.   --Urology recommended iV fluid, pain management, urine strain.   --UA negative for UTI  #CKD stage 4  #s/p AICD, patient doesn't know the reason, possible primary prevention for HFrEF vs arrhythmia, stable, follow up with EP.     pertinent subjective information: Pt reports suboptimal po intake, however, which is now improving. Eating close to 50% of meals and picking on meals here and there. F this morning. per RD observation, ate almost 50% of BF today. No chewing/swallowing difficulty reported. Discussed nutrition supplements -- pt agreeable to add as appropriate.

## 2021-12-08 NOTE — CHART NOTE - NSCHARTNOTEFT_GEN_A_CORE
<<<RESIDENT DISCHARGE NOTE>>>     JANETTE SAUL  [id number]    VITAL SIGNS:  T(F): 99 (12-08-21 @ 07:24), Max: 99 (12-08-21 @ 07:24)  HR: 72 (12-08-21 @ 07:24)  BP: 136/77 (12-08-21 @ 07:24)  SpO2: 94% (12-08-21 @ 07:24)      PHYSICAL EXAMINATION:  GENERAL: NAD, lying in bed comfortably  CHEST/LUNG: Clear to auscultation bilaterally; No rales, rhonchi, wheezing, or rubs. Unlabored respirations  HEART: Regular rate and rhythm; No murmurs, rubs, or gallops  ABDOMEN: Bowel sounds present; Soft, Nontender, Nondistended. No hepatomegally  EXTREMITIES:  2+ Peripheral Pulses, brisk capillary refill. No clubbing, cyanosis, or edema  NERVOUS SYSTEM:  Alert & Oriented X3, speech clear. No deficits   MSK: FROM all 4 extremities, full and equal strength  SKIN: No rashes or lesions      TEST RESULTS:                        10.6   7.33  )-----------( 273      ( 08 Dec 2021 04:30 )             34.1       12-08    137  |  99  |  30<H>  ----------------------------<  103<H>  4.8   |  22  |  1.9<H>    Ca    9.0      08 Dec 2021 04:30  Mg     1.8     12-08    TPro  5.7<L>  /  Alb  3.2<L>  /  TBili  0.4  /  DBili  x   /  AST  13  /  ALT  12  /  AlkPhos  68  12-08      DISPOSITION:  Home

## 2021-12-08 NOTE — DISCHARGE NOTE PROVIDER - NSDCCPCAREPLAN_GEN_ALL_CORE_FT
PRINCIPAL DISCHARGE DIAGNOSIS  Diagnosis: Flank pain  Assessment and Plan of Treatment: You have two kindey stones that will eventually pass on its own. We also believe that you may have a urinary tract infection because of the stones. Please finish course of antibioitics as precribed. Please follow up with urology as outpatient.      SECONDARY DISCHARGE DIAGNOSES  Diagnosis: Kidney stones  Assessment and Plan of Treatment:      PRINCIPAL DISCHARGE DIAGNOSIS  Diagnosis: Acute UTI  Assessment and Plan of Treatment: You have two kindey stones that will eventually pass on its own. We also believe that you may have a urinary tract infection because of the stones. Please finish course of antibioitics as precribed. Please follow up with urology as outpatient.      SECONDARY DISCHARGE DIAGNOSES  Diagnosis: Kidney stones  Assessment and Plan of Treatment:     Diagnosis: Constipation  Assessment and Plan of Treatment: continue bowel regimen, make sure you have sufficient amount of fiber and water in your diet.

## 2021-12-08 NOTE — DISCHARGE NOTE PROVIDER - PROVIDER TOKENS
PROVIDER:[TOKEN:[[id number]],FOLLOWUP:[2 weeks]],PROVIDER:[TOKEN:[[id number]],FOLLOWUP:[1 week]]

## 2021-12-08 NOTE — DIETITIAN INITIAL EVALUATION ADULT. - PHYSCIAL ASSESSMENT
AAOX4; no edema noted; GI: WDL, LBM 12/6 -- previously constipated, now relieved; Skin: ecchymosis; no other wts in EMR

## 2021-12-08 NOTE — DIETITIAN INITIAL EVALUATION ADULT. - PERTINENT MEDS FT
MEDICATIONS  (STANDING):  aspirin enteric coated 81 milliGRAM(s) Oral daily  atorvastatin 40 milliGRAM(s) Oral at bedtime  carvedilol 3.125 milliGRAM(s) Oral daily  cefTRIAXone   IVPB 1000 milliGRAM(s) IV Intermittent every 24 hours  fenofibrate Tablet 48 milliGRAM(s) Oral daily  finasteride 5 milliGRAM(s) Oral daily  heparin   Injectable 5000 Unit(s) SubCutaneous every 12 hours  lactated ringers. 1000 milliLiter(s) (75 mL/Hr) IV Continuous <Continuous>  polyethylene glycol 3350 17 Gram(s) Oral daily  senna 2 Tablet(s) Oral at bedtime    MEDICATIONS  (PRN):  ondansetron Injectable 4 milliGRAM(s) IV Push every 8 hours PRN Nausea and/or Vomiting

## 2021-12-08 NOTE — DISCHARGE NOTE PROVIDER - NSDCMRMEDTOKEN_GEN_ALL_CORE_FT
aspirin 325 mg oral delayed release tablet: 1 tab(s) orally once a day  atorvastatin 40 mg oral tablet: 1 tab(s) orally once a day  carvedilol 3.125 mg oral tablet: 1 tab(s) orally once a day  fenofibrate 48 mg oral tablet: 1 tab(s) orally once a day  finasteride 5 mg oral tablet: 1 tab(s) orally once a day  Flomax 0.4 mg oral capsule: 1 cap(s) orally once a day   oxycodone-acetaminophen 5 mg-325 mg oral tablet: 1 tab(s) orally 3 times a day MDD:3   aspirin 325 mg oral delayed release tablet: 1 tab(s) orally once a day  atorvastatin 40 mg oral tablet: 1 tab(s) orally once a day  carvedilol 3.125 mg oral tablet: 1 tab(s) orally once a day  fenofibrate 48 mg oral tablet: 1 tab(s) orally once a day  finasteride 5 mg oral tablet: 1 tab(s) orally once a day  Flomax 0.4 mg oral capsule: 1 cap(s) orally once a day   levoFLOXacin 250 mg oral tablet: 1 tab(s) orally once a day   oxycodone-acetaminophen 5 mg-325 mg oral tablet: 1 tab(s) orally 3 times a day MDD:3  senna oral tablet: 2 tab(s) orally once a day (at bedtime)

## 2021-12-09 ENCOUNTER — TRANSCRIPTION ENCOUNTER (OUTPATIENT)
Age: 81
End: 2021-12-09

## 2021-12-09 VITALS
RESPIRATION RATE: 18 BRPM | SYSTOLIC BLOOD PRESSURE: 149 MMHG | TEMPERATURE: 97 F | DIASTOLIC BLOOD PRESSURE: 73 MMHG | HEART RATE: 80 BPM

## 2021-12-09 PROCEDURE — 99239 HOSP IP/OBS DSCHRG MGMT >30: CPT

## 2021-12-09 RX ADMIN — Medication 48 MILLIGRAM(S): at 11:38

## 2021-12-09 RX ADMIN — POLYETHYLENE GLYCOL 3350 17 GRAM(S): 17 POWDER, FOR SOLUTION ORAL at 11:39

## 2021-12-09 RX ADMIN — Medication 81 MILLIGRAM(S): at 11:38

## 2021-12-09 RX ADMIN — CARVEDILOL PHOSPHATE 3.12 MILLIGRAM(S): 80 CAPSULE, EXTENDED RELEASE ORAL at 05:17

## 2021-12-09 RX ADMIN — HEPARIN SODIUM 5000 UNIT(S): 5000 INJECTION INTRAVENOUS; SUBCUTANEOUS at 05:19

## 2021-12-09 RX ADMIN — FINASTERIDE 5 MILLIGRAM(S): 5 TABLET, FILM COATED ORAL at 11:38

## 2021-12-09 NOTE — DISCHARGE NOTE NURSING/CASE MANAGEMENT/SOCIAL WORK - NSDCPEFALRISK_GEN_ALL_CORE
For information on Fall & Injury Prevention, visit: [email or web address] OR  [email or web address] OR  https://www.cdc.gov/steadi/patient.html

## 2021-12-09 NOTE — DISCHARGE NOTE NURSING/CASE MANAGEMENT/SOCIAL WORK - PATIENT PORTAL LINK FT
You can access the FollowMyHealth Patient Portal offered by [facility] by registering at the following website: [email or web address]. By joining [employer]’s FollowMyHealth portal, you will also be able to view your health information using other applications (apps) compatible with our system.

## 2021-12-09 NOTE — PROGRESS NOTE ADULT - ASSESSMENT
81 year old male with PMH of HTN, CAD s/p PCI, ICD and CKD stage 3-4 and BPH came to ED for abdominal pain for the last 5 days, this is his 3rd visit since 11/30, when his pain started, he was found with left proximal ureteral stone and recommended with hydration and pain control, he denies fever, chills or vomiting.  consulted for further evaluation what repeat CTAP on 12/4/21 revealed persistent mild left hydronephrosis with interval migration of previously described two left ureteral calculi, now both adjacent within left distal ureter, measuring up to 0.6 x 0.5 x 0.4 cm distally and 0.3 x 0.2 x 0.2 cm proximally.    # One episode of fever, might be UTI, given pain in lower abdomen, positive Cx. no signs of PNA, phlebitis, cellulitis  - UCx E. Fecalis   - started levaquin , will give for 7 days   - fever resolved    # Left nephrolithiasis with mild hydronephrosis:   CT abdomen and Pelvis: showed  revealed persistent mild left hydronephrosis with interval migration of previously described two left ureteral calculi, now both adjacent within left distal ureter, measuring up to 0.6 x 0.5 x 0.4 cm distally and 0.3 x 0.2 x 0.2 cm proximally.  OP follow up with urology   cont Flomax     # Constipation - resolved, pt had few large BM   Continue bowel regimen    # CKD stage 4  Mild Left hydronephrosis.   Cr 2.0 stable.     # s/p AICD, patient doesn't know the reason, possible primary prevention for HFrEF vs arrhythmia,   stable, follow up with EP.   Continue Metoprolol.     # CAD s/p PCI  Continue ASA, Lipitor and Metoprolol .    Pt is clinically stable for discharge home today.     I spent 35 min total on discharge process including postdischarge instructions, medications review, coordination of care with CM.

## 2021-12-09 NOTE — PROGRESS NOTE ADULT - SUBJECTIVE AND OBJECTIVE BOX
JANETTE SAUL    Patient is a 81y old  Male who presents with a chief complaint of abd pain (08 Dec 2021 13:14)    HPI:  81 year old male with pmh of CKD, CAD s/p PCI, HTN, high cholesterol, PPM for unknown reason (on ASA 325mg. Patient said he was on eliquis in the past but he had bleeding and was switched to aspiring high dose), BPH, presents to the ED with abdominal pain for the past 5 days. Pt follows with Dr. Botello as outpatient for management of his BPH. Patient has presented to the ED multiple times for abdominal pain and was found on 11/30/21 to have mild left perinephric stranding and proximal hydronephroureter extending to the level of an obstructing 5 x 5 x 5 mm left proximal ureteral calculus; and additional 2 mm calculus is noted within the left proximal ureter just proximal to the obstructing stone. Nonobstructing 4 mm left renal lower pole calculus. Cholelithiasis, sigmoid colonic diverticulosis, without evidence of diverticulitis and an enlarged prostate gland with coarse calcifications. At that time, patient's pain was controlled and patient was discharged with  follow up on 12/1/21. Patient followed with Dr. Botello the next day and was discharged on flomax for continued trial of passage of stone. Patient returned today complaining of abdominal pain and noted that he has not moved his bowels for the past 5 days. Patient reports pain is left sided and radiates to the mid lower abdomen. Pain is severe and stabbing in nature. he reports no fever or chills. no dysuria, frequency, hematuria, or passage of any stone. no flank pain. Patient also reports some nausea and was about to vomit.    In the ED, patient was HD stable. labs showed no new abnormalities.  consulted for further evaluation what repeat CTAP on 12/4/21 revealed persistent mild left hydronephrosis with interval migration of previously described two left ureteral calculi, now both adjacent within left distal ureter, measuring up to 0.6 x 0.5 x 0.4 cm distally and 0.3 x 0.2 x 0.2 cm proximally. Previously seen nonobstructing left renal calculi not identified. Patient seen and examined with wife at bedside in the ED. Patient reports resolution of his pain with pain medication.  (04 Dec 2021 11:03)    INTERVAL HPI/OVERNIGHT EVENTS: no eevnts overnight, pt was ready for discharge yesterday, but he refused to leave. Today he does not have abdominal pain, feels fine and ready to leave.     ROS: All ROS negative    PHYSICAL EXAM:  T(C): 35.9, Max: 36.7 (12-08-21 @ 16:19)  HR: 80 (73 - 85)  BP: 149/73 (137/70 - 154/89)  RR: 18 (18 - 18)  SpO2: --    GENERAL: NAD  PULMONARY/CHEST: No rales, rhonchi, or wheezing  CARDIOVASC: Regular rate and rhythm; No murmurs  GI/ABDOMEN: Soft, Nontender, Nondistended; Bowel sounds present  EXTREMITIES:  No clubbing, cyanosis, or edema, no deformity. No calf tenderness b/l.  NERVOUS SYSTEM:  Alert & Oriented X3, no gross neurological  deficit     LABS: no new labs today           Culture - Blood (collected 07 Dec 2021 18:27)  Source: .Blood None  Preliminary Report (09 Dec 2021 01:02):    No growth to date.    Culture - Blood (collected 07 Dec 2021 18:27)  Source: .Blood None  Preliminary Report (09 Dec 2021 01:02):    No growth to date.      MEDICATIONS  (STANDING):  aspirin enteric coated 81 milliGRAM(s) Oral daily  atorvastatin 40 milliGRAM(s) Oral at bedtime  carvedilol 3.125 milliGRAM(s) Oral daily  cefTRIAXone   IVPB 1000 milliGRAM(s) IV Intermittent every 24 hours  fenofibrate Tablet 48 milliGRAM(s) Oral daily  finasteride 5 milliGRAM(s) Oral daily  heparin   Injectable 5000 Unit(s) SubCutaneous every 12 hours  lactated ringers. 1000 milliLiter(s) (75 mL/Hr) IV Continuous <Continuous>  polyethylene glycol 3350 17 Gram(s) Oral daily  senna 2 Tablet(s) Oral at bedtime  tamsulosin 0.4 milliGRAM(s) Oral at bedtime    MEDICATIONS  (PRN):  acetaminophen     Tablet .. 650 milliGRAM(s) Oral every 6 hours PRN Mild Pain (1 - 3)  aluminum hydroxide/magnesium hydroxide/simethicone Suspension 30 milliLiter(s) Oral every 6 hours PRN Dyspepsia  HYDROmorphone   Tablet 2 milliGRAM(s) Oral every 4 hours PRN Severe Pain (7 - 10)  ondansetron Injectable 4 milliGRAM(s) IV Push every 8 hours PRN Nausea and/or Vomiting

## 2021-12-13 PROBLEM — N18.30 CHRONIC KIDNEY DISEASE, STAGE 3 UNSPECIFIED: Chronic | Status: ACTIVE | Noted: 2021-12-04

## 2021-12-13 PROBLEM — I25.10 ATHEROSCLEROTIC HEART DISEASE OF NATIVE CORONARY ARTERY WITHOUT ANGINA PECTORIS: Chronic | Status: ACTIVE | Noted: 2021-12-04

## 2021-12-13 PROBLEM — Z95.0 PRESENCE OF CARDIAC PACEMAKER: Chronic | Status: ACTIVE | Noted: 2021-12-04

## 2021-12-13 PROBLEM — I10 ESSENTIAL (PRIMARY) HYPERTENSION: Chronic | Status: ACTIVE | Noted: 2021-12-04

## 2021-12-13 PROBLEM — Z87.438 PERSONAL HISTORY OF OTHER DISEASES OF MALE GENITAL ORGANS: Chronic | Status: ACTIVE | Noted: 2021-12-04

## 2021-12-13 LAB
CULTURE RESULTS: SIGNIFICANT CHANGE UP
CULTURE RESULTS: SIGNIFICANT CHANGE UP
SPECIMEN SOURCE: SIGNIFICANT CHANGE UP
SPECIMEN SOURCE: SIGNIFICANT CHANGE UP

## 2021-12-14 DIAGNOSIS — Z20.822 CONTACT WITH AND (SUSPECTED) EXPOSURE TO COVID-19: ICD-10-CM

## 2021-12-14 DIAGNOSIS — N18.4 CHRONIC KIDNEY DISEASE, STAGE 4 (SEVERE): ICD-10-CM

## 2021-12-14 DIAGNOSIS — K57.30 DIVERTICULOSIS OF LARGE INTESTINE WITHOUT PERFORATION OR ABSCESS WITHOUT BLEEDING: ICD-10-CM

## 2021-12-14 DIAGNOSIS — I25.10 ATHEROSCLEROTIC HEART DISEASE OF NATIVE CORONARY ARTERY WITHOUT ANGINA PECTORIS: ICD-10-CM

## 2021-12-14 DIAGNOSIS — R10.9 UNSPECIFIED ABDOMINAL PAIN: ICD-10-CM

## 2021-12-14 DIAGNOSIS — N13.2 HYDRONEPHROSIS WITH RENAL AND URETERAL CALCULOUS OBSTRUCTION: ICD-10-CM

## 2021-12-14 DIAGNOSIS — Z95.1 PRESENCE OF AORTOCORONARY BYPASS GRAFT: ICD-10-CM

## 2021-12-14 DIAGNOSIS — K80.20 CALCULUS OF GALLBLADDER WITHOUT CHOLECYSTITIS WITHOUT OBSTRUCTION: ICD-10-CM

## 2021-12-14 DIAGNOSIS — K59.00 CONSTIPATION, UNSPECIFIED: ICD-10-CM

## 2021-12-14 DIAGNOSIS — N40.0 BENIGN PROSTATIC HYPERPLASIA WITHOUT LOWER URINARY TRACT SYMPTOMS: ICD-10-CM

## 2021-12-14 DIAGNOSIS — N17.9 ACUTE KIDNEY FAILURE, UNSPECIFIED: ICD-10-CM

## 2021-12-14 DIAGNOSIS — I13.0 HYPERTENSIVE HEART AND CHRONIC KIDNEY DISEASE WITH HEART FAILURE AND STAGE 1 THROUGH STAGE 4 CHRONIC KIDNEY DISEASE, OR UNSPECIFIED CHRONIC KIDNEY DISEASE: ICD-10-CM

## 2021-12-14 DIAGNOSIS — I50.22 CHRONIC SYSTOLIC (CONGESTIVE) HEART FAILURE: ICD-10-CM

## 2021-12-14 DIAGNOSIS — Z95.5 PRESENCE OF CORONARY ANGIOPLASTY IMPLANT AND GRAFT: ICD-10-CM

## 2021-12-14 DIAGNOSIS — Z95.0 PRESENCE OF CARDIAC PACEMAKER: ICD-10-CM

## 2021-12-28 ENCOUNTER — NON-APPOINTMENT (OUTPATIENT)
Age: 81
End: 2021-12-28

## 2021-12-30 ENCOUNTER — NON-APPOINTMENT (OUTPATIENT)
Age: 81
End: 2021-12-30

## 2022-01-20 ENCOUNTER — OUTPATIENT (OUTPATIENT)
Dept: OUTPATIENT SERVICES | Facility: HOSPITAL | Age: 82
LOS: 1 days | Discharge: HOME | End: 2022-01-20
Payer: MEDICARE

## 2022-01-20 ENCOUNTER — RESULT REVIEW (OUTPATIENT)
Age: 82
End: 2022-01-20

## 2022-01-20 DIAGNOSIS — N40.1 BENIGN PROSTATIC HYPERPLASIA WITH LOWER URINARY TRACT SYMPTOMS: ICD-10-CM

## 2022-01-20 PROCEDURE — 76770 US EXAM ABDO BACK WALL COMP: CPT | Mod: 26

## 2022-01-21 ENCOUNTER — NON-APPOINTMENT (OUTPATIENT)
Age: 82
End: 2022-01-21

## 2022-01-27 ENCOUNTER — APPOINTMENT (OUTPATIENT)
Dept: UROLOGY | Facility: CLINIC | Age: 82
End: 2022-01-27
Payer: MEDICARE

## 2022-01-27 VITALS — WEIGHT: 148 LBS | BODY MASS INDEX: 23.78 KG/M2 | HEIGHT: 66 IN

## 2022-01-27 DIAGNOSIS — N20.1 CALCULUS OF URETER: ICD-10-CM

## 2022-01-27 PROCEDURE — 99214 OFFICE O/P EST MOD 30 MIN: CPT

## 2022-01-27 NOTE — HISTORY OF PRESENT ILLNESS
[FreeTextEntry1] : JANETTE SAUL is a 81 year old male who presented with BPH and intermittent urinary retention in the past- maintained on flomax and finasteride. Recently presented at the hospital as a result of renal colic. CT demonstrating  mild Lt hydronephrosis to the Level of proximal ureteral stone and an additional punctate stone proximal, and nonobstructing LLP stone noted.  \par Patient passed the stones.\par \par Reports resolution of flank pain denies any hematuria.\par Reports he has been taking flomax and finasteride, LUTS stable.\par \par Renal bladder ultrasound images visualized 1/21/22 postvoid residual 140 mL no hydronephrosis bilaterally 65 g prostate bilateral renal cysts including multiple polyps parapelvic cysts and mildly thin septated cyst 5.1 cm.\par \par CT a/p images visualized from 11/30/2021 mild Lt hydronephrosis to the Level of 5 mm proximal ureteral stone and an additional 2 mm stone proximal to it. 4 mm nonobstructing LLP stone noted. \par BL parapelvic cyst. \par \par Cr=2.1 from 12/1/2021 electrolytes ok\par \par UCx= ngtd 11/30/2021\par \par Previously: \par UA w microheme and pyuria 10/2019\par NO UCx\par repeat urine testing neg for blood\par Ua from pcp 10/22/21-neg blood\par bladder scan from today 11/18/21- 41ml\par \par Renal and bladder ultrasound images from 9/2019 shows post void residual 147 g with BPH 71 g.  And moderate intravesical protrusion\par Left renal cyst.\par \par Urinalysis negative for microscopic hematuria\par \par Denies  PMH including kidney stones, recurrent UTIs. \par Family History: unknown as pt adopted\par \par Old records reviewed\par Cr 1.9 similar to prior. States had prior PSA which were normal.\par Prior bladder sono 2016 78g prostate and pt unable to void 450 ml volume\par

## 2022-01-27 NOTE — ASSESSMENT
[FreeTextEntry1] : JANETTE SAUL is a 81 year old male who presented with BPH and intermittent urinary retention in the past- maintained on flomax and finasteride. Recently presented at the hospital as a result of renal colic. CT demonstrating  mild Lt hydronephrosis to the Level of proximal ureteral stone and an additional punctate stone proximal, and nonobstructing LLP stone noted.  \par Patient passed the stones.\par \par Stone analysis\par Patient will discontinue his access multivitamins and vitamin C&D.\par Stone nutrition discussed otherwise\par This is a for stone episode for him\par Continue Flomax and finasteride side effect discussed\par Follow-up in 1 year renal bladder ultrasound prior we will confirm no more stone burden and reassess mildly complex renal cyst

## 2022-01-28 ENCOUNTER — NON-APPOINTMENT (OUTPATIENT)
Age: 82
End: 2022-01-28

## 2022-01-28 ENCOUNTER — APPOINTMENT (OUTPATIENT)
Dept: CARDIOLOGY | Facility: CLINIC | Age: 82
End: 2022-01-28
Payer: MEDICARE

## 2022-01-28 PROCEDURE — 93296 REM INTERROG EVL PM/IDS: CPT

## 2022-01-28 PROCEDURE — 93295 DEV INTERROG REMOTE 1/2/MLT: CPT

## 2022-02-01 ENCOUNTER — NON-APPOINTMENT (OUTPATIENT)
Age: 82
End: 2022-02-01

## 2022-02-01 LAB — NIDUS STONE QN: NORMAL

## 2022-03-25 ENCOUNTER — APPOINTMENT (OUTPATIENT)
Dept: CARDIOLOGY | Facility: CLINIC | Age: 82
End: 2022-03-25
Payer: MEDICARE

## 2022-03-25 VITALS
BODY MASS INDEX: 24.43 KG/M2 | WEIGHT: 152 LBS | DIASTOLIC BLOOD PRESSURE: 75 MMHG | TEMPERATURE: 98.7 F | RESPIRATION RATE: 16 BRPM | HEART RATE: 71 BPM | HEIGHT: 66 IN | SYSTOLIC BLOOD PRESSURE: 132 MMHG

## 2022-03-25 DIAGNOSIS — Z87.442 PERSONAL HISTORY OF URINARY CALCULI: ICD-10-CM

## 2022-03-25 DIAGNOSIS — I50.22 CHRONIC SYSTOLIC (CONGESTIVE) HEART FAILURE: ICD-10-CM

## 2022-03-25 PROCEDURE — 93290 INTERROG DEV EVAL ICPMS IP: CPT

## 2022-03-25 PROCEDURE — 93284 PRGRMG EVAL IMPLANTABLE DFB: CPT

## 2022-03-25 PROCEDURE — 99213 OFFICE O/P EST LOW 20 MIN: CPT | Mod: 25

## 2022-03-25 NOTE — PHYSICAL EXAM
[General Appearance - Well Developed] : well developed [General Appearance - Well Nourished] : well nourished [Heart Rate And Rhythm] : heart rate and rhythm were normal [Heart Sounds] : normal S1 and S2 [Respiration, Rhythm And Depth] : normal respiratory rhythm and effort [Left Infraclavicular] : left infraclavicular area

## 2022-03-27 PROBLEM — I50.22 CHRONIC SYSTOLIC CONGESTIVE HEART FAILURE: Status: ACTIVE | Noted: 2020-07-31

## 2022-04-16 NOTE — ASSESSMENT
[FreeTextEntry1] : s/p CRT -D implant in 2016 for ischemic cardiomyopathy,  CAD with stent, PAF received watchman in 2017 secondary to GI  bleed in 2017.\par Presents for routine device interrogation\par EKG ( 3/25/2022) 70 bpm sinus rhythm , Q wave in the inferior leads\par \par DEvice interrogations showed stable parameter\par V paced 91%\par Optivol fluid accumulation since January but is trending down.\par \par Good exercise tolerance\par He also admits doing some bench pressed which I cautioned him, as it put a stressed on the leads under the clavicle. Patient seemed surprised about this, but demonstrated to him that the wire passes under the clavicle.\par Encouraged him to continue with walking or treadmill exercise.\par \par He is enrolled on remote monitoring and is transmitting\par \par AF\par n-ASA 325mg. Patient said he was on eliquis in the past but he \par had bleeding and was switched to aspiring high dose\par \par \par RTO in 9 months with NP\par \par \par I was physically present for the key portion of the evaluation and management service provided. I agree with the above history, physical and plan which I have reviewed and edited where appropriate.\par \par \par \par \par \par \par

## 2022-04-16 NOTE — HISTORY OF PRESENT ILLNESS
[None] : The patient complains of no symptoms [Palpitations] : no palpitations [SOB] : no dyspnea [Erythema at Site] : no erythema at device site [Swelling at Site] : no swelling at device site [de-identified] : \par 81  years old male with pmh of CAD, MI S/P PTCA with stent, ICM/ HF -NYHA II ,  S/P CRT- D , PAF, GI bleed on Eliquis . He underwent a Watchman procedure on 7/6/2017 , S/P HILARIA , Stopped Warfarin 8/24/2017\par Denies any sob, DONOVAN, PND, orthopnea, no palpitations, no dizziness,lightheadedness, denies chest pains\par \par Good activity and exercise tolerance, jogs on a treadmill 7 days \par \par \par

## 2022-04-16 NOTE — PROCEDURE
[No] : not [NSR] : normal sinus rhythm [See Scanned Paceart Report] : See scanned paceart report [See Device Printout] : See device printout [CRT-D] : Cardiac resynchronization therapy defibrillator [DDDR] : DDDR [Charge Time: ___ sec] : charge time was [unfilled] seconds [Threshold Testing Performed] : Threshold testing was performed [Programmed for Longevity] : output reprogrammed for improved battery longevity [Longevity: ___ months] : The estimated remaining battery life is [unfilled] months [Sensing Amplitude ___mv] : sensing amplitude was [unfilled] mv [Lead Imp:  ___ohms] : lead impedance was [unfilled] ohms [___V @] : [unfilled] V [___ ms] : [unfilled] ms [Asense-Vsense ___ %] : Asense-Vsense [unfilled]% [Asense-Vpace ___ %] : Asense-Vpace [unfilled]% [Apace-Vsense ___ %] : Apace-Vsense [unfilled]% [Apace-Vpace ___ %] : Apace-Vpace [unfilled]% [de-identified] : Medtronic [de-identified] : Amplia MRI Quad [de-identified] : [id number] [de-identified] : 6/30/16 [de-identified] : 60 [de-identified] : total  91.4%

## 2022-06-02 NOTE — ED ADULT NURSE NOTE - CAS EDN DISCHARGE ASSESSMENT
Pt states that a child jumped on his back and when they fell to the ground the child struck the back of his left calf. Pt is having significant pain to his calf now. Pt denies hitting head/LOC.   
Alert and oriented to person, place and time

## 2022-06-27 ENCOUNTER — NON-APPOINTMENT (OUTPATIENT)
Age: 82
End: 2022-06-27

## 2022-06-27 ENCOUNTER — APPOINTMENT (OUTPATIENT)
Dept: CARDIOLOGY | Facility: CLINIC | Age: 82
End: 2022-06-27

## 2022-06-27 PROCEDURE — 93296 REM INTERROG EVL PM/IDS: CPT

## 2022-06-27 PROCEDURE — 93295 DEV INTERROG REMOTE 1/2/MLT: CPT

## 2022-09-26 ENCOUNTER — APPOINTMENT (OUTPATIENT)
Dept: CARDIOLOGY | Facility: CLINIC | Age: 82
End: 2022-09-26

## 2022-09-26 ENCOUNTER — NON-APPOINTMENT (OUTPATIENT)
Age: 82
End: 2022-09-26

## 2022-09-26 PROCEDURE — 93295 DEV INTERROG REMOTE 1/2/MLT: CPT

## 2022-09-26 PROCEDURE — 93296 REM INTERROG EVL PM/IDS: CPT

## 2022-11-11 ENCOUNTER — RESULT REVIEW (OUTPATIENT)
Age: 82
End: 2022-11-11

## 2022-11-11 ENCOUNTER — OUTPATIENT (OUTPATIENT)
Dept: OUTPATIENT SERVICES | Facility: HOSPITAL | Age: 82
LOS: 1 days | Discharge: HOME | End: 2022-11-11

## 2022-11-11 DIAGNOSIS — N20.0 CALCULUS OF KIDNEY: ICD-10-CM

## 2022-11-11 PROCEDURE — 76770 US EXAM ABDO BACK WALL COMP: CPT | Mod: 26

## 2022-11-17 ENCOUNTER — NON-APPOINTMENT (OUTPATIENT)
Age: 82
End: 2022-11-17

## 2022-11-21 ENCOUNTER — APPOINTMENT (OUTPATIENT)
Dept: UROLOGY | Facility: CLINIC | Age: 82
End: 2022-11-21

## 2022-11-21 VITALS
DIASTOLIC BLOOD PRESSURE: 75 MMHG | TEMPERATURE: 97.7 F | HEIGHT: 66 IN | OXYGEN SATURATION: 98 % | BODY MASS INDEX: 24.43 KG/M2 | HEART RATE: 73 BPM | RESPIRATION RATE: 18 BRPM | WEIGHT: 152 LBS | SYSTOLIC BLOOD PRESSURE: 134 MMHG

## 2022-11-21 DIAGNOSIS — R33.9 RETENTION OF URINE, UNSPECIFIED: ICD-10-CM

## 2022-11-21 PROCEDURE — 99214 OFFICE O/P EST MOD 30 MIN: CPT

## 2022-11-21 NOTE — ADDENDUM
[FreeTextEntry1] : Patient's note was transcribed with the assistance of a medical scribe under the supervision of Dr. Botello.\par I, Dr. Botello, have reviewed the patient's chart and agree that it aligns with my medical decisions.\par Aura Chen, our scribe, also served as a chaperone for physical examination purposes.\par \par \par

## 2022-11-21 NOTE — ASSESSMENT
[FreeTextEntry1] : JANETTE SAUL is a 82 year old male who presented with BPH and intermittent urinary retention in the past- maintained on flomax and finasteride. Recently presented at the hospital as a result of renal colic. CT demonstrating  mild Lt hydronephrosis to the Level of proximal ureteral stone and an additional punctate stone proximal, and nonobstructing LLP stone noted.  \par Patient passed the stones.\par Current sono free of stones.\par \par Plan \par - d/c multivitamins and vit c as per stone prevention diet \par -continue Flomax and finasteride \par Follow-up in 1 year renal bladder ultrasound prior we will confirm no more stone burden and reassess mildly complex renal cyst

## 2022-11-21 NOTE — HISTORY OF PRESENT ILLNESS
[FreeTextEntry1] : JANETTE SAUL is a 82 year old male who presented with BPH and intermittent urinary retention in the past- maintained on flomax and finasteride. Recently presented at the hospital as a result of renal colic. CT demonstrating  mild Lt hydronephrosis to the Level of proximal ureteral stone and an additional punctate stone proximal, and nonobstructing LLP stone noted.  \par Patient passed the stones.\par \par Pt is doing well and is taking both medications and prescribed. He denies any gross hematuria,dysuria or associated symptoms . nocturia 0-1x. \par \par RBUS 11/17/22 images visualized - No renal calculi were visualized on this study.Left 4.4 cm midpole renal cyst with stable thinned internal nonvascular septation.Enlarged prostate measuring 52 mL with mild intravesical protrusion \par \par previously \par Renal bladder ultrasound images visualized 1/21/22 postvoid residual 140 mL no hydronephrosis bilaterally 65 g prostate bilateral renal cysts including multiple polyps parapelvic cysts and mildly thin septated cyst 5.1 cm.\par \par CT a/p images visualized from 11/30/2021 mild Lt hydronephrosis to the Level of 5 mm proximal ureteral stone and an additional 2 mm stone proximal to it. 4 mm nonobstructing LLP stone noted. \par BL parapelvic cyst. \par \par Cr=2.1 from 12/1/2021 electrolytes ok\par \par UCx= ngtd 11/30/2021\par \par Previously: \par UA w microheme and pyuria 10/2019\par NO UCx\par repeat urine testing neg for blood\par Ua from pcp 10/22/21-neg blood\par bladder scan from today 11/18/21- 41ml\par \par Renal and bladder ultrasound images from 9/2019 shows post void residual 147 g with BPH 71 g.  And moderate intravesical protrusion\par Left renal cyst.\par \par Urinalysis negative for microscopic hematuria\par \par Denies  PMH including kidney stones, recurrent UTIs. \par Family History: unknown as pt adopted\par \par Old records reviewed\par Cr 1.9 similar to prior. States had prior PSA which were normal.\par Prior bladder sono 2016 78g prostate and pt unable to void 450 ml volume\par

## 2022-12-19 NOTE — ED PROCEDURE NOTE - PROCEDURE NAME, MLM
Chief Complaint   Patient presents with     telephone visit      Laser hair removal    Teledermatology Nurse Call Patients:     Are you in the [facility] at the time of the encounter? yes    Today's visit will be billed to you and your insurance.    A teledermatology visit is not as thorough as an in-person visit and the quality of the photograph sent may not be of the same quality as that taken by the dermatology clinic.    Charis Chaudhary,   Virtual Visit Facilitator     Laceration Repair

## 2022-12-23 ENCOUNTER — APPOINTMENT (OUTPATIENT)
Dept: CARDIOLOGY | Facility: CLINIC | Age: 82
End: 2022-12-23
Payer: MEDICARE

## 2022-12-23 VITALS
DIASTOLIC BLOOD PRESSURE: 73 MMHG | HEIGHT: 66 IN | HEART RATE: 81 BPM | SYSTOLIC BLOOD PRESSURE: 133 MMHG | TEMPERATURE: 97 F | WEIGHT: 152 LBS | BODY MASS INDEX: 24.43 KG/M2

## 2022-12-23 VITALS — SYSTOLIC BLOOD PRESSURE: 105 MMHG | DIASTOLIC BLOOD PRESSURE: 74 MMHG

## 2022-12-23 DIAGNOSIS — R55 SYNCOPE AND COLLAPSE: ICD-10-CM

## 2022-12-23 DIAGNOSIS — I25.10 ATHEROSCLEROTIC HEART DISEASE OF NATIVE CORONARY ARTERY W/OUT ANGINA PECTORIS: ICD-10-CM

## 2022-12-23 PROCEDURE — 99214 OFFICE O/P EST MOD 30 MIN: CPT | Mod: 25

## 2022-12-23 PROCEDURE — 93000 ELECTROCARDIOGRAM COMPLETE: CPT | Mod: 59

## 2022-12-23 PROCEDURE — 93290 INTERROG DEV EVAL ICPMS IP: CPT

## 2022-12-23 PROCEDURE — 93284 PRGRMG EVAL IMPLANTABLE DFB: CPT

## 2023-01-19 ENCOUNTER — RESULT REVIEW (OUTPATIENT)
Age: 83
End: 2023-01-19

## 2023-01-19 ENCOUNTER — OUTPATIENT (OUTPATIENT)
Dept: OUTPATIENT SERVICES | Facility: HOSPITAL | Age: 83
LOS: 1 days | Discharge: HOME | End: 2023-01-19
Payer: MEDICARE

## 2023-01-19 DIAGNOSIS — N20.0 CALCULUS OF KIDNEY: ICD-10-CM

## 2023-01-19 PROCEDURE — 76770 US EXAM ABDO BACK WALL COMP: CPT | Mod: 26

## 2023-01-20 ENCOUNTER — NON-APPOINTMENT (OUTPATIENT)
Age: 83
End: 2023-01-20

## 2023-01-30 ENCOUNTER — APPOINTMENT (OUTPATIENT)
Dept: UROLOGY | Facility: CLINIC | Age: 83
End: 2023-01-30
Payer: MEDICARE

## 2023-01-30 VITALS
BODY MASS INDEX: 24.43 KG/M2 | HEIGHT: 66 IN | SYSTOLIC BLOOD PRESSURE: 106 MMHG | HEART RATE: 80 BPM | DIASTOLIC BLOOD PRESSURE: 72 MMHG | RESPIRATION RATE: 18 BRPM | OXYGEN SATURATION: 97 % | WEIGHT: 152 LBS | TEMPERATURE: 98 F

## 2023-01-30 DIAGNOSIS — N28.1 CYST OF KIDNEY, ACQUIRED: ICD-10-CM

## 2023-01-30 DIAGNOSIS — Z87.442 PERSONAL HISTORY OF URINARY CALCULI: ICD-10-CM

## 2023-01-30 DIAGNOSIS — N20.0 CALCULUS OF KIDNEY: ICD-10-CM

## 2023-01-30 DIAGNOSIS — N13.8 BENIGN PROSTATIC HYPERPLASIA WITH LOWER URINARY TRACT SYMPMS: ICD-10-CM

## 2023-01-30 DIAGNOSIS — N40.1 BENIGN PROSTATIC HYPERPLASIA WITH LOWER URINARY TRACT SYMPMS: ICD-10-CM

## 2023-01-30 PROCEDURE — 99214 OFFICE O/P EST MOD 30 MIN: CPT

## 2023-01-30 NOTE — HISTORY OF PRESENT ILLNESS
[FreeTextEntry1] : JANETTE SAUL is a 82 year old male who presented with BPH and intermittent urinary retention in the past- maintained on flomax and finasteride. Recently presented at the hospital as a result of renal colic. CT demonstrated mild Lt hydronephrosis to the Level of proximal ureteral stone and an additional punctate stone proximal, and nonobstructing LLP stone noted.  \par \par Pt is doing well and denies gross hematuria , dysuria or other associated symptoms. He has not had trouble  voiding  \par \par RBUS 01/20/23 images visualized -  Enlarged prostate gland with post void residual and mild intravesical protrusion.Simple cysts within the right kidney, benign cysts within the left kidney - On my read thinly septated walls , unchanged.No hydronephrosis or nephrolithiasis . \par \par previously \par RBUS 11/17/22 images - No renal calculi were visualized on this study.Left 4.4 cm midpole renal cyst with stable thinned internal nonvascular septation.Enlarged prostate measuring 52 mL with mild intravesical protrusion \par \par Renal bladder ultrasound images visualized 1/21/22 postvoid residual 140 mL no hydronephrosis bilaterally 65 g prostate bilateral renal cysts including multiple polyps parapelvic cysts and mildly thin septated cyst 5.1 cm.\par \par CT a/p images from 11/30/2021 mild Lt hydronephrosis to the Level of 5 mm proximal ureteral stone and an additional 2 mm stone proximal to it. 4 mm nonobstructing LLP stone noted. \par BL parapelvic cyst. \par \par Cr=2.1 from 12/1/2021 electrolytes ok\par \par UCx= ngtd 11/30/2021\par \par Previously: \par UA w microheme and pyuria 10/2019\par NO UCx\par repeat urine testing neg for blood\par Ua from pcp 10/22/21-neg blood\par bladder scan from today 11/18/21- 41ml\par \par Renal and bladder ultrasound images from 9/2019 shows post void residual 147 g with BPH 71 g.  And moderate intravesical protrusion\par Left renal cyst.\par \par Urinalysis negative for microscopic hematuria\par \par Denies  PMH including kidney stones, recurrent UTIs. \par Family History: unknown as pt adopted\par \par Old records reviewed\par Cr 1.9 similar to prior. States had prior PSA which were normal.\par Prior bladder sono 2016 78g prostate and pt unable to void 450 ml volume\par

## 2023-01-30 NOTE — ASSESSMENT
[FreeTextEntry1] : JANETTE SAUL is a 82 year old male who presented with BPH and intermittent urinary retention in the past- maintained on flomax and finasteride. Recently presented at the hospital as a result of renal colic. CT demonstrating  mild Lt hydronephrosis to the Level of proximal ureteral stone and an additional punctate stone proximal, and nonobstructing LLP stone noted.  \par Patient passed the stones.\par \par Current sono free of stones.\par Stable BPH.\par \par Plan \par - f/u in 1 year with RBUS prior \par -cont Flomax and finasteride , reiterated side effects and patient not having this time.\par Follow-up in 1 year renal bladder ultrasound prior we will confirm no more stone burden and reassess mildly complex renal cyst

## 2023-02-07 NOTE — PROCEDURE
[No] : not [NSR] : normal sinus rhythm [See Scanned Paceart Report] : See scanned paceart report [See Device Printout] : See device printout [CRT-D] : Cardiac resynchronization therapy defibrillator [DDDR] : DDDR [Voltage: ___ volts] : Voltage was [unfilled] volts [Charge Time: ___ sec] : charge time was [unfilled] seconds [Longevity: ___ months] : The estimated remaining battery life is [unfilled] months [Normal] : The battery status is normal. [Threshold Testing Performed] : Threshold testing was performed [Sensing Amplitude ___mv] : sensing amplitude was [unfilled] mv [Lead Imp:  ___ohms] : lead impedance was [unfilled] ohms [___V @] : [unfilled] V [___ ms] : [unfilled] ms [Asense-Vsense ___ %] : Asense-Vsense [unfilled]% [Asense-Vpace ___ %] : Asense-Vpace [unfilled]% [Apace-Vsense ___ %] : Apace-Vsense [unfilled]% [Apace-Vpace ___ %] : Apace-Vpace [unfilled]% [de-identified] : [employer]tronic [de-identified] : Amplia CRTD [de-identified] : [id number] [de-identified] : 6/30/16 [de-identified] : 60 [de-identified] : 62 AT/AF episodes longest episode 71 mins, V-rates generally controlled\par AT/AF burden 0.1%\par Optivol below threshold

## 2023-02-07 NOTE — PHYSICAL EXAM
[General Appearance - Well Developed] : well developed [General Appearance - Well Nourished] : well nourished [Heart Rate And Rhythm] : heart rate and rhythm were normal [Heart Sounds] : normal S1 and S2 [Respiration, Rhythm And Depth] : normal respiratory rhythm and effort [Left Infraclavicular] : left infraclavicular area [Abdomen Soft] : soft [Nail Clubbing] : no clubbing of the fingernails [Cyanosis, Localized] : no localized cyanosis
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
## 2023-02-07 NOTE — CARDIOLOGY SUMMARY
[de-identified] : 12/23/2022: A-sensed, BIV-paced 78 bpm\par EKG ( 3/25/2022) 70 bpm sinus rhythm , Q wave in the inferior leads

## 2023-02-07 NOTE — HISTORY OF PRESENT ILLNESS
[None] : The patient complains of no symptoms [Palpitations] : no palpitations [SOB] : no dyspnea [Erythema at Site] : no erythema at device site [Swelling at Site] : no swelling at device site [de-identified] : \par 82  years old male with pmh of CAD, MI S/P PTCA with stent, ICM/ HF -NYHA II ,  S/P CRT- D , PAF, GI bleed on Eliquis . He underwent a Watchman procedure on 7/6/2017 , S/P HILARIA , Stopped Warfarin 8/24/2017\par Denies any sob, DONOVAN, PND, orthopnea, no palpitations, no dizziness,lightheadedness, denies chest pains\par \par Good activity and exercise tolerance, jogs on a treadmill 7 days \par \par \par

## 2023-03-27 ENCOUNTER — NON-APPOINTMENT (OUTPATIENT)
Age: 83
End: 2023-03-27

## 2023-03-27 ENCOUNTER — APPOINTMENT (OUTPATIENT)
Dept: CARDIOLOGY | Facility: CLINIC | Age: 83
End: 2023-03-27
Payer: MEDICARE

## 2023-03-27 PROCEDURE — 93295 DEV INTERROG REMOTE 1/2/MLT: CPT

## 2023-03-27 PROCEDURE — 93296 REM INTERROG EVL PM/IDS: CPT

## 2023-06-27 ENCOUNTER — NON-APPOINTMENT (OUTPATIENT)
Age: 83
End: 2023-06-27

## 2023-06-27 ENCOUNTER — APPOINTMENT (OUTPATIENT)
Dept: CARDIOLOGY | Facility: CLINIC | Age: 83
End: 2023-06-27

## 2023-06-29 ENCOUNTER — NON-APPOINTMENT (OUTPATIENT)
Age: 83
End: 2023-06-29

## 2023-07-14 ENCOUNTER — APPOINTMENT (OUTPATIENT)
Dept: CARDIOLOGY | Facility: CLINIC | Age: 83
End: 2023-07-14
Payer: MEDICARE

## 2023-07-14 ENCOUNTER — NON-APPOINTMENT (OUTPATIENT)
Age: 83
End: 2023-07-14

## 2023-07-14 PROCEDURE — 93295 DEV INTERROG REMOTE 1/2/MLT: CPT

## 2023-07-14 PROCEDURE — 93296 REM INTERROG EVL PM/IDS: CPT

## 2023-09-29 ENCOUNTER — NON-APPOINTMENT (OUTPATIENT)
Age: 83
End: 2023-09-29

## 2023-09-29 ENCOUNTER — APPOINTMENT (OUTPATIENT)
Dept: ELECTROPHYSIOLOGY | Facility: CLINIC | Age: 83
End: 2023-09-29
Payer: MEDICARE

## 2023-09-29 VITALS
WEIGHT: 155 LBS | SYSTOLIC BLOOD PRESSURE: 119 MMHG | HEART RATE: 73 BPM | TEMPERATURE: 97.1 F | HEIGHT: 66 IN | DIASTOLIC BLOOD PRESSURE: 67 MMHG | BODY MASS INDEX: 24.91 KG/M2

## 2023-09-29 DIAGNOSIS — I25.5 ISCHEMIC CARDIOMYOPATHY: ICD-10-CM

## 2023-09-29 PROCEDURE — 93284 PRGRMG EVAL IMPLANTABLE DFB: CPT

## 2023-09-29 PROCEDURE — 99214 OFFICE O/P EST MOD 30 MIN: CPT

## 2023-09-29 PROCEDURE — 93290 INTERROG DEV EVAL ICPMS IP: CPT

## 2023-09-29 RX ORDER — ASPIRIN 325 MG/1
325 TABLET, COATED ORAL
Qty: 3 | Refills: 0 | Status: COMPLETED | COMMUNITY
Start: 2017-08-24 | End: 2023-09-29

## 2023-10-01 ENCOUNTER — INPATIENT (INPATIENT)
Facility: HOSPITAL | Age: 83
LOS: 9 days | Discharge: HOME | DRG: 197 | End: 2023-10-11
Attending: HOSPITALIST | Admitting: STUDENT IN AN ORGANIZED HEALTH CARE EDUCATION/TRAINING PROGRAM
Payer: COMMERCIAL

## 2023-10-01 VITALS
SYSTOLIC BLOOD PRESSURE: 114 MMHG | RESPIRATION RATE: 14 BRPM | TEMPERATURE: 98 F | OXYGEN SATURATION: 98 % | WEIGHT: 149.91 LBS | HEIGHT: 69 IN | DIASTOLIC BLOOD PRESSURE: 57 MMHG | HEART RATE: 64 BPM

## 2023-10-01 LAB
ALBUMIN SERPL ELPH-MCNC: 4.5 G/DL — SIGNIFICANT CHANGE UP (ref 3.5–5.2)
ALP SERPL-CCNC: 55 U/L — SIGNIFICANT CHANGE UP (ref 30–115)
ALT FLD-CCNC: 20 U/L — SIGNIFICANT CHANGE UP (ref 0–41)
ANION GAP SERPL CALC-SCNC: 10 MMOL/L — SIGNIFICANT CHANGE UP (ref 7–14)
AST SERPL-CCNC: 23 U/L — SIGNIFICANT CHANGE UP (ref 0–41)
BASOPHILS # BLD AUTO: 0.03 K/UL — SIGNIFICANT CHANGE UP (ref 0–0.2)
BASOPHILS NFR BLD AUTO: 0.2 % — SIGNIFICANT CHANGE UP (ref 0–1)
BILIRUB SERPL-MCNC: 0.5 MG/DL — SIGNIFICANT CHANGE UP (ref 0.2–1.2)
BUN SERPL-MCNC: 20 MG/DL — SIGNIFICANT CHANGE UP (ref 10–20)
CALCIUM SERPL-MCNC: 9.3 MG/DL — SIGNIFICANT CHANGE UP (ref 8.4–10.5)
CHLORIDE SERPL-SCNC: 106 MMOL/L — SIGNIFICANT CHANGE UP (ref 98–110)
CO2 SERPL-SCNC: 25 MMOL/L — SIGNIFICANT CHANGE UP (ref 17–32)
CREAT SERPL-MCNC: 1.3 MG/DL — SIGNIFICANT CHANGE UP (ref 0.7–1.5)
EGFR: 54 ML/MIN/1.73M2 — LOW
EOSINOPHIL # BLD AUTO: 0.11 K/UL — SIGNIFICANT CHANGE UP (ref 0–0.7)
EOSINOPHIL NFR BLD AUTO: 0.9 % — SIGNIFICANT CHANGE UP (ref 0–8)
GLUCOSE SERPL-MCNC: 97 MG/DL — SIGNIFICANT CHANGE UP (ref 70–99)
HCT VFR BLD CALC: 40.9 % — LOW (ref 42–52)
HGB BLD-MCNC: 12.8 G/DL — LOW (ref 14–18)
IMM GRANULOCYTES NFR BLD AUTO: 0.5 % — HIGH (ref 0.1–0.3)
LYMPHOCYTES # BLD AUTO: 1.56 K/UL — SIGNIFICANT CHANGE UP (ref 1.2–3.4)
LYMPHOCYTES # BLD AUTO: 12.9 % — LOW (ref 20.5–51.1)
MCHC RBC-ENTMCNC: 27.2 PG — SIGNIFICANT CHANGE UP (ref 27–31)
MCHC RBC-ENTMCNC: 31.3 G/DL — LOW (ref 32–37)
MCV RBC AUTO: 86.8 FL — SIGNIFICANT CHANGE UP (ref 80–94)
MONOCYTES # BLD AUTO: 0.71 K/UL — HIGH (ref 0.1–0.6)
MONOCYTES NFR BLD AUTO: 5.9 % — SIGNIFICANT CHANGE UP (ref 1.7–9.3)
NEUTROPHILS # BLD AUTO: 9.6 K/UL — HIGH (ref 1.4–6.5)
NEUTROPHILS NFR BLD AUTO: 79.6 % — HIGH (ref 42.2–75.2)
NRBC # BLD: 0 /100 WBCS — SIGNIFICANT CHANGE UP (ref 0–0)
PLATELET # BLD AUTO: 176 K/UL — SIGNIFICANT CHANGE UP (ref 130–400)
PMV BLD: 10.8 FL — HIGH (ref 7.4–10.4)
POTASSIUM SERPL-MCNC: 4.3 MMOL/L — SIGNIFICANT CHANGE UP (ref 3.5–5)
POTASSIUM SERPL-SCNC: 4.3 MMOL/L — SIGNIFICANT CHANGE UP (ref 3.5–5)
PROT SERPL-MCNC: 6.7 G/DL — SIGNIFICANT CHANGE UP (ref 6–8)
RBC # BLD: 4.71 M/UL — SIGNIFICANT CHANGE UP (ref 4.7–6.1)
RBC # FLD: 14.2 % — SIGNIFICANT CHANGE UP (ref 11.5–14.5)
SODIUM SERPL-SCNC: 141 MMOL/L — SIGNIFICANT CHANGE UP (ref 135–146)
WBC # BLD: 12.07 K/UL — HIGH (ref 4.8–10.8)
WBC # FLD AUTO: 12.07 K/UL — HIGH (ref 4.8–10.8)

## 2023-10-01 PROCEDURE — 73120 X-RAY EXAM OF HAND: CPT | Mod: 26,RT

## 2023-10-01 PROCEDURE — 73080 X-RAY EXAM OF ELBOW: CPT | Mod: 26,RT

## 2023-10-01 PROCEDURE — 73552 X-RAY EXAM OF FEMUR 2/>: CPT | Mod: 26,50

## 2023-10-01 PROCEDURE — 73110 X-RAY EXAM OF WRIST: CPT | Mod: 26,RT

## 2023-10-01 PROCEDURE — 71045 X-RAY EXAM CHEST 1 VIEW: CPT | Mod: 26

## 2023-10-01 PROCEDURE — 72125 CT NECK SPINE W/O DYE: CPT | Mod: 26,MA

## 2023-10-01 PROCEDURE — 73700 CT LOWER EXTREMITY W/O DYE: CPT | Mod: 26,LT,MA

## 2023-10-01 PROCEDURE — 72170 X-RAY EXAM OF PELVIS: CPT | Mod: 26

## 2023-10-01 PROCEDURE — 70450 CT HEAD/BRAIN W/O DYE: CPT | Mod: 26,MA

## 2023-10-01 PROCEDURE — 99285 EMERGENCY DEPT VISIT HI MDM: CPT

## 2023-10-01 RX ORDER — METHOCARBAMOL 500 MG/1
750 TABLET, FILM COATED ORAL ONCE
Refills: 0 | Status: COMPLETED | OUTPATIENT
Start: 2023-10-01 | End: 2023-10-01

## 2023-10-01 RX ORDER — MORPHINE SULFATE 50 MG/1
4 CAPSULE, EXTENDED RELEASE ORAL ONCE
Refills: 0 | Status: DISCONTINUED | OUTPATIENT
Start: 2023-10-01 | End: 2023-10-01

## 2023-10-01 RX ORDER — TETANUS TOXOID, REDUCED DIPHTHERIA TOXOID AND ACELLULAR PERTUSSIS VACCINE, ADSORBED 5; 2.5; 8; 8; 2.5 [IU]/.5ML; [IU]/.5ML; UG/.5ML; UG/.5ML; UG/.5ML
0.5 SUSPENSION INTRAMUSCULAR ONCE
Refills: 0 | Status: COMPLETED | OUTPATIENT
Start: 2023-10-01 | End: 2023-10-01

## 2023-10-01 RX ORDER — DIAZEPAM 5 MG
5 TABLET ORAL ONCE
Refills: 0 | Status: DISCONTINUED | OUTPATIENT
Start: 2023-10-01 | End: 2023-10-01

## 2023-10-01 RX ADMIN — TETANUS TOXOID, REDUCED DIPHTHERIA TOXOID AND ACELLULAR PERTUSSIS VACCINE, ADSORBED 0.5 MILLILITER(S): 5; 2.5; 8; 8; 2.5 SUSPENSION INTRAMUSCULAR at 19:33

## 2023-10-01 RX ADMIN — METHOCARBAMOL 750 MILLIGRAM(S): 500 TABLET, FILM COATED ORAL at 19:39

## 2023-10-01 RX ADMIN — MORPHINE SULFATE 4 MILLIGRAM(S): 50 CAPSULE, EXTENDED RELEASE ORAL at 19:33

## 2023-10-01 RX ADMIN — MORPHINE SULFATE 4 MILLIGRAM(S): 50 CAPSULE, EXTENDED RELEASE ORAL at 20:00

## 2023-10-01 RX ADMIN — Medication 5 MILLIGRAM(S): at 22:07
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
## 2023-10-01 NOTE — ED ADULT NURSE NOTE - NSFALLHARMRISKINTERV_ED_ALL_ED
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
## 2023-10-01 NOTE — ED PROVIDER NOTE - PHYSICAL EXAMINATION
CONSTITUTIONAL: Well-developed; well-nourished; in no acute distress.   SKIN: Warm, dry  HEAD: Normocephalic; atraumatic  EYES: EOMI, normal sclera and conjunctiva   ENT: No nasal discharge; airway clear.  NECK: Supple; non tender. No midline spinal tenderness.   CARD:  Regular rate and rhythm. Normal S1, S2. 2+ peripheral pulses. Normal capillary refill.   RESP: No increased WOB. CTA b/l without wheezes, crackles, rhonchi. No chest wall tenderness.   ABD: Normoactive BS. Soft, nontender, nondistended. Pelvis stable.  EXT: Holding left hip and knee in flexion, unable to fully extend 2/2 pain. No bony tenderness or deformities; tender to posterior left thigh.    NEURO: Alert, oriented, grossly unremarkable  PSYCH: Cooperative, appropriate.
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
## 2023-10-01 NOTE — ED PROVIDER NOTE - PROGRESS NOTE DETAILS
SS Received s/o from Dr Summers - Patient unable to ambulate. Reviewed labs and imaging with patient and wife at bedside

## 2023-10-02 DIAGNOSIS — M79.605 PAIN IN LEFT LEG: ICD-10-CM

## 2023-10-02 LAB
ALBUMIN SERPL ELPH-MCNC: 4 G/DL — SIGNIFICANT CHANGE UP (ref 3.5–5.2)
ALP SERPL-CCNC: 48 U/L — SIGNIFICANT CHANGE UP (ref 30–115)
ALT FLD-CCNC: 17 U/L — SIGNIFICANT CHANGE UP (ref 0–41)
ANION GAP SERPL CALC-SCNC: 11 MMOL/L — SIGNIFICANT CHANGE UP (ref 7–14)
AST SERPL-CCNC: 24 U/L — SIGNIFICANT CHANGE UP (ref 0–41)
BILIRUB SERPL-MCNC: 0.6 MG/DL — SIGNIFICANT CHANGE UP (ref 0.2–1.2)
BUN SERPL-MCNC: 18 MG/DL — SIGNIFICANT CHANGE UP (ref 10–20)
CALCIUM SERPL-MCNC: 8.7 MG/DL — SIGNIFICANT CHANGE UP (ref 8.4–10.5)
CHLORIDE SERPL-SCNC: 105 MMOL/L — SIGNIFICANT CHANGE UP (ref 98–110)
CO2 SERPL-SCNC: 23 MMOL/L — SIGNIFICANT CHANGE UP (ref 17–32)
CREAT SERPL-MCNC: 1.2 MG/DL — SIGNIFICANT CHANGE UP (ref 0.7–1.5)
EGFR: 60 ML/MIN/1.73M2 — SIGNIFICANT CHANGE UP
GLUCOSE SERPL-MCNC: 135 MG/DL — HIGH (ref 70–99)
HCT VFR BLD CALC: 38 % — LOW (ref 42–52)
HGB BLD-MCNC: 12.1 G/DL — LOW (ref 14–18)
MAGNESIUM SERPL-MCNC: 1.7 MG/DL — LOW (ref 1.8–2.4)
MCHC RBC-ENTMCNC: 27.3 PG — SIGNIFICANT CHANGE UP (ref 27–31)
MCHC RBC-ENTMCNC: 31.8 G/DL — LOW (ref 32–37)
MCV RBC AUTO: 85.6 FL — SIGNIFICANT CHANGE UP (ref 80–94)
NRBC # BLD: 0 /100 WBCS — SIGNIFICANT CHANGE UP (ref 0–0)
PLATELET # BLD AUTO: 144 K/UL — SIGNIFICANT CHANGE UP (ref 130–400)
PMV BLD: 11.2 FL — HIGH (ref 7.4–10.4)
POTASSIUM SERPL-MCNC: 3.7 MMOL/L — SIGNIFICANT CHANGE UP (ref 3.5–5)
POTASSIUM SERPL-SCNC: 3.7 MMOL/L — SIGNIFICANT CHANGE UP (ref 3.5–5)
PROT SERPL-MCNC: 6 G/DL — SIGNIFICANT CHANGE UP (ref 6–8)
RBC # BLD: 4.44 M/UL — LOW (ref 4.7–6.1)
RBC # FLD: 14.4 % — SIGNIFICANT CHANGE UP (ref 11.5–14.5)
SODIUM SERPL-SCNC: 139 MMOL/L — SIGNIFICANT CHANGE UP (ref 135–146)
WBC # BLD: 7.86 K/UL — SIGNIFICANT CHANGE UP (ref 4.8–10.8)
WBC # FLD AUTO: 7.86 K/UL — SIGNIFICANT CHANGE UP (ref 4.8–10.8)

## 2023-10-02 PROCEDURE — 97162 PT EVAL MOD COMPLEX 30 MIN: CPT | Mod: GP

## 2023-10-02 PROCEDURE — 97116 GAIT TRAINING THERAPY: CPT | Mod: GP

## 2023-10-02 PROCEDURE — 80053 COMPREHEN METABOLIC PANEL: CPT

## 2023-10-02 PROCEDURE — 85025 COMPLETE CBC W/AUTO DIFF WBC: CPT

## 2023-10-02 PROCEDURE — 85027 COMPLETE CBC AUTOMATED: CPT

## 2023-10-02 PROCEDURE — 80048 BASIC METABOLIC PNL TOTAL CA: CPT

## 2023-10-02 PROCEDURE — 97110 THERAPEUTIC EXERCISES: CPT | Mod: GP

## 2023-10-02 PROCEDURE — 93970 EXTREMITY STUDY: CPT

## 2023-10-02 PROCEDURE — 97530 THERAPEUTIC ACTIVITIES: CPT | Mod: GP

## 2023-10-02 PROCEDURE — 0225U NFCT DS DNA&RNA 21 SARSCOV2: CPT

## 2023-10-02 PROCEDURE — 36415 COLL VENOUS BLD VENIPUNCTURE: CPT

## 2023-10-02 PROCEDURE — 83735 ASSAY OF MAGNESIUM: CPT

## 2023-10-02 PROCEDURE — 82550 ASSAY OF CK (CPK): CPT

## 2023-10-02 PROCEDURE — 99222 1ST HOSP IP/OBS MODERATE 55: CPT

## 2023-10-02 RX ORDER — FINASTERIDE 5 MG/1
5 TABLET, FILM COATED ORAL DAILY
Refills: 0 | Status: DISCONTINUED | OUTPATIENT
Start: 2023-10-02 | End: 2023-10-11

## 2023-10-02 RX ORDER — HEPARIN SODIUM 5000 [USP'U]/ML
5000 INJECTION INTRAVENOUS; SUBCUTANEOUS EVERY 8 HOURS
Refills: 0 | Status: DISCONTINUED | OUTPATIENT
Start: 2023-10-02 | End: 2023-10-03

## 2023-10-02 RX ORDER — METHOCARBAMOL 500 MG/1
1500 TABLET, FILM COATED ORAL EVERY 8 HOURS
Refills: 0 | Status: DISCONTINUED | OUTPATIENT
Start: 2023-10-02 | End: 2023-10-06

## 2023-10-02 RX ORDER — ASPIRIN/CALCIUM CARB/MAGNESIUM 324 MG
1 TABLET ORAL
Qty: 0 | Refills: 0 | DISCHARGE

## 2023-10-02 RX ORDER — ATORVASTATIN CALCIUM 80 MG/1
1 TABLET, FILM COATED ORAL
Qty: 0 | Refills: 0 | DISCHARGE

## 2023-10-02 RX ORDER — ACETAMINOPHEN 500 MG
650 TABLET ORAL EVERY 6 HOURS
Refills: 0 | Status: DISCONTINUED | OUTPATIENT
Start: 2023-10-02 | End: 2023-10-04

## 2023-10-02 RX ORDER — FENOFIBRATE,MICRONIZED 130 MG
48 CAPSULE ORAL DAILY
Refills: 0 | Status: DISCONTINUED | OUTPATIENT
Start: 2023-10-02 | End: 2023-10-11

## 2023-10-02 RX ORDER — ASPIRIN/CALCIUM CARB/MAGNESIUM 324 MG
1 TABLET ORAL
Refills: 0 | DISCHARGE

## 2023-10-02 RX ORDER — CARVEDILOL PHOSPHATE 80 MG/1
3.12 CAPSULE, EXTENDED RELEASE ORAL DAILY
Refills: 0 | Status: DISCONTINUED | OUTPATIENT
Start: 2023-10-02 | End: 2023-10-11

## 2023-10-02 RX ORDER — MAGNESIUM SULFATE 500 MG/ML
2 VIAL (ML) INJECTION ONCE
Refills: 0 | Status: COMPLETED | OUTPATIENT
Start: 2023-10-02 | End: 2023-10-02

## 2023-10-02 RX ORDER — ATORVASTATIN CALCIUM 80 MG/1
80 TABLET, FILM COATED ORAL AT BEDTIME
Refills: 0 | Status: DISCONTINUED | OUTPATIENT
Start: 2023-10-02 | End: 2023-10-02

## 2023-10-02 RX ORDER — ATORVASTATIN CALCIUM 80 MG/1
1 TABLET, FILM COATED ORAL
Refills: 0 | DISCHARGE

## 2023-10-02 RX ORDER — ASPIRIN/CALCIUM CARB/MAGNESIUM 324 MG
81 TABLET ORAL DAILY
Refills: 0 | Status: DISCONTINUED | OUTPATIENT
Start: 2023-10-02 | End: 2023-10-11

## 2023-10-02 RX ADMIN — Medication 81 MILLIGRAM(S): at 12:48

## 2023-10-02 RX ADMIN — CARVEDILOL PHOSPHATE 3.12 MILLIGRAM(S): 80 CAPSULE, EXTENDED RELEASE ORAL at 17:47

## 2023-10-02 RX ADMIN — FINASTERIDE 5 MILLIGRAM(S): 5 TABLET, FILM COATED ORAL at 12:48

## 2023-10-02 RX ADMIN — HEPARIN SODIUM 5000 UNIT(S): 5000 INJECTION INTRAVENOUS; SUBCUTANEOUS at 22:22

## 2023-10-02 RX ADMIN — Medication 48 MILLIGRAM(S): at 14:32

## 2023-10-02 RX ADMIN — Medication 25 GRAM(S): at 16:28

## 2023-10-02 RX ADMIN — METHOCARBAMOL 1500 MILLIGRAM(S): 500 TABLET, FILM COATED ORAL at 10:26

## 2023-10-02 RX ADMIN — Medication 650 MILLIGRAM(S): at 17:50

## 2023-10-02 RX ADMIN — METHOCARBAMOL 1500 MILLIGRAM(S): 500 TABLET, FILM COATED ORAL at 17:47

## 2023-10-02 RX ADMIN — METHOCARBAMOL 1500 MILLIGRAM(S): 500 TABLET, FILM COATED ORAL at 22:22

## 2023-10-02 NOTE — H&P ADULT - NSHPPHYSICALEXAM_GEN_ALL_CORE
PHYSICAL EXAM:  Constitutional: pt is comfortable in bed; no acute distress; well-groomed  HEENT: Full ROM in bilateral eyes  Respiratory: (+) sounds in all lung fields; no crackles or wheezes appreciated  Cardiovascular: S1 S2 regular rate and rhythm; no murmurs or gallops appreciated  Gastrointestinal: abdomen is non-distended, non-tender to superficial and deep palpation  Extremities: b/l lower extremities are non-edematous  Vascular: Warm and Well perfused UE and LE; no mottling or dusky skin   Neurological: AxO x3 to self, place and year  MSK: No significant erythema, swelling palpable over b/l thighs; mild tenderness to palpation over b/l thighs
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
## 2023-10-02 NOTE — H&P ADULT - NSHPSOCIALHISTORY_GEN_ALL_CORE
Former Smoker, quit over 60 years ago (2-3 cigarettes daily sporadically, 60 years ago)  No ETOH use

## 2023-10-02 NOTE — H&P ADULT - NSHPLABSRESULTS_GEN_ALL_CORE
12.8   12.07 )-----------( 176      ( 01 Oct 2023 19:58 )             40.9   10-01    141  |  106  |  20  ----------------------------<  97  4.3   |  25  |  1.3    Ca    9.3      01 Oct 2023 19:58    TPro  6.7  /  Alb  4.5  /  TBili  0.5  /  DBili  x   /  AST  23  /  ALT  20  /  AlkPhos  55  10-01
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
## 2023-10-03 LAB
ALBUMIN SERPL ELPH-MCNC: 3.8 G/DL — SIGNIFICANT CHANGE UP (ref 3.5–5.2)
ALP SERPL-CCNC: 50 U/L — SIGNIFICANT CHANGE UP (ref 30–115)
ALT FLD-CCNC: 17 U/L — SIGNIFICANT CHANGE UP (ref 0–41)
ANION GAP SERPL CALC-SCNC: 12 MMOL/L — SIGNIFICANT CHANGE UP (ref 7–14)
AST SERPL-CCNC: 29 U/L — SIGNIFICANT CHANGE UP (ref 0–41)
BILIRUB SERPL-MCNC: 0.7 MG/DL — SIGNIFICANT CHANGE UP (ref 0.2–1.2)
BUN SERPL-MCNC: 17 MG/DL — SIGNIFICANT CHANGE UP (ref 10–20)
CALCIUM SERPL-MCNC: 9.2 MG/DL — SIGNIFICANT CHANGE UP (ref 8.4–10.4)
CHLORIDE SERPL-SCNC: 106 MMOL/L — SIGNIFICANT CHANGE UP (ref 98–110)
CK SERPL-CCNC: 390 U/L — HIGH (ref 0–225)
CO2 SERPL-SCNC: 23 MMOL/L — SIGNIFICANT CHANGE UP (ref 17–32)
CREAT SERPL-MCNC: 1.4 MG/DL — SIGNIFICANT CHANGE UP (ref 0.7–1.5)
EGFR: 50 ML/MIN/1.73M2 — LOW
GLUCOSE SERPL-MCNC: 126 MG/DL — HIGH (ref 70–99)
HCT VFR BLD CALC: 39.7 % — LOW (ref 42–52)
HGB BLD-MCNC: 12.7 G/DL — LOW (ref 14–18)
MAGNESIUM SERPL-MCNC: 1.7 MG/DL — LOW (ref 1.8–2.4)
MCHC RBC-ENTMCNC: 27.1 PG — SIGNIFICANT CHANGE UP (ref 27–31)
MCHC RBC-ENTMCNC: 32 G/DL — SIGNIFICANT CHANGE UP (ref 32–37)
MCV RBC AUTO: 84.8 FL — SIGNIFICANT CHANGE UP (ref 80–94)
NRBC # BLD: 0 /100 WBCS — SIGNIFICANT CHANGE UP (ref 0–0)
PLATELET # BLD AUTO: 165 K/UL — SIGNIFICANT CHANGE UP (ref 130–400)
PMV BLD: 11.2 FL — HIGH (ref 7.4–10.4)
POTASSIUM SERPL-MCNC: 4.9 MMOL/L — SIGNIFICANT CHANGE UP (ref 3.5–5)
POTASSIUM SERPL-SCNC: 4.9 MMOL/L — SIGNIFICANT CHANGE UP (ref 3.5–5)
PROT SERPL-MCNC: 6.3 G/DL — SIGNIFICANT CHANGE UP (ref 6–8)
RBC # BLD: 4.68 M/UL — LOW (ref 4.7–6.1)
RBC # FLD: 14.5 % — SIGNIFICANT CHANGE UP (ref 11.5–14.5)
SODIUM SERPL-SCNC: 141 MMOL/L — SIGNIFICANT CHANGE UP (ref 135–146)
WBC # BLD: 10.15 K/UL — SIGNIFICANT CHANGE UP (ref 4.8–10.8)
WBC # FLD AUTO: 10.15 K/UL — SIGNIFICANT CHANGE UP (ref 4.8–10.8)

## 2023-10-03 PROCEDURE — 99222 1ST HOSP IP/OBS MODERATE 55: CPT | Mod: GC

## 2023-10-03 PROCEDURE — 99232 SBSQ HOSP IP/OBS MODERATE 35: CPT

## 2023-10-03 PROCEDURE — 93970 EXTREMITY STUDY: CPT | Mod: 26

## 2023-10-03 RX ORDER — ENOXAPARIN SODIUM 100 MG/ML
70 INJECTION SUBCUTANEOUS EVERY 12 HOURS
Refills: 0 | Status: DISCONTINUED | OUTPATIENT
Start: 2023-10-03 | End: 2023-10-09

## 2023-10-03 RX ORDER — MORPHINE SULFATE 50 MG/1
2 CAPSULE, EXTENDED RELEASE ORAL EVERY 4 HOURS
Refills: 0 | Status: DISCONTINUED | OUTPATIENT
Start: 2023-10-03 | End: 2023-10-04

## 2023-10-03 RX ORDER — OXYCODONE AND ACETAMINOPHEN 5; 325 MG/1; MG/1
1 TABLET ORAL EVERY 6 HOURS
Refills: 0 | Status: DISCONTINUED | OUTPATIENT
Start: 2023-10-03 | End: 2023-10-04

## 2023-10-03 RX ORDER — MORPHINE SULFATE 50 MG/1
1 CAPSULE, EXTENDED RELEASE ORAL EVERY 4 HOURS
Refills: 0 | Status: DISCONTINUED | OUTPATIENT
Start: 2023-10-03 | End: 2023-10-03

## 2023-10-03 RX ORDER — SODIUM CHLORIDE 9 MG/ML
1000 INJECTION INTRAMUSCULAR; INTRAVENOUS; SUBCUTANEOUS
Refills: 0 | Status: DISCONTINUED | OUTPATIENT
Start: 2023-10-03 | End: 2023-10-04

## 2023-10-03 RX ORDER — TAMSULOSIN HYDROCHLORIDE 0.4 MG/1
0.4 CAPSULE ORAL DAILY
Refills: 0 | Status: DISCONTINUED | OUTPATIENT
Start: 2023-10-03 | End: 2023-10-11

## 2023-10-03 RX ORDER — MORPHINE SULFATE 50 MG/1
2 CAPSULE, EXTENDED RELEASE ORAL ONCE
Refills: 0 | Status: DISCONTINUED | OUTPATIENT
Start: 2023-10-03 | End: 2023-10-03

## 2023-10-03 RX ADMIN — Medication 48 MILLIGRAM(S): at 13:25

## 2023-10-03 RX ADMIN — MORPHINE SULFATE 2 MILLIGRAM(S): 50 CAPSULE, EXTENDED RELEASE ORAL at 12:04

## 2023-10-03 RX ADMIN — MORPHINE SULFATE 2 MILLIGRAM(S): 50 CAPSULE, EXTENDED RELEASE ORAL at 13:25

## 2023-10-03 RX ADMIN — Medication 650 MILLIGRAM(S): at 06:25

## 2023-10-03 RX ADMIN — Medication 650 MILLIGRAM(S): at 12:05

## 2023-10-03 RX ADMIN — Medication 650 MILLIGRAM(S): at 18:18

## 2023-10-03 RX ADMIN — FINASTERIDE 5 MILLIGRAM(S): 5 TABLET, FILM COATED ORAL at 12:04

## 2023-10-03 RX ADMIN — ENOXAPARIN SODIUM 70 MILLIGRAM(S): 100 INJECTION SUBCUTANEOUS at 18:18

## 2023-10-03 RX ADMIN — TAMSULOSIN HYDROCHLORIDE 0.4 MILLIGRAM(S): 0.4 CAPSULE ORAL at 12:05

## 2023-10-03 RX ADMIN — Medication 650 MILLIGRAM(S): at 22:14

## 2023-10-03 RX ADMIN — Medication 650 MILLIGRAM(S): at 13:25

## 2023-10-03 RX ADMIN — Medication 650 MILLIGRAM(S): at 23:06

## 2023-10-03 RX ADMIN — Medication 81 MILLIGRAM(S): at 12:05

## 2023-10-03 RX ADMIN — METHOCARBAMOL 1500 MILLIGRAM(S): 500 TABLET, FILM COATED ORAL at 06:26

## 2023-10-03 RX ADMIN — CARVEDILOL PHOSPHATE 3.12 MILLIGRAM(S): 80 CAPSULE, EXTENDED RELEASE ORAL at 06:25

## 2023-10-03 RX ADMIN — SODIUM CHLORIDE 75 MILLILITER(S): 9 INJECTION INTRAMUSCULAR; INTRAVENOUS; SUBCUTANEOUS at 22:49

## 2023-10-03 RX ADMIN — HEPARIN SODIUM 5000 UNIT(S): 5000 INJECTION INTRAVENOUS; SUBCUTANEOUS at 06:25

## 2023-10-03 RX ADMIN — METHOCARBAMOL 1500 MILLIGRAM(S): 500 TABLET, FILM COATED ORAL at 22:14
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
## 2023-10-03 NOTE — CONSULT NOTE ADULT - NS ATTEND AMEND GEN_ALL_CORE FT
I personally reviewed the venous duplex- DVT is visualized in LLE.   Would recommend treatment per CHEST guidelines.   FU in office in 4 weeks.
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
## 2023-10-03 NOTE — PHYSICAL THERAPY INITIAL EVALUATION ADULT - LEVEL OF INDEPENDENCE: SIT/STAND, REHAB EVAL
pt unable to complete this activirty secondary to excaerbation of L LE pain/maximum assist (25% patients effort)
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
## 2023-10-04 LAB
ANION GAP SERPL CALC-SCNC: 13 MMOL/L — SIGNIFICANT CHANGE UP (ref 7–14)
BUN SERPL-MCNC: 33 MG/DL — HIGH (ref 10–20)
CALCIUM SERPL-MCNC: 9 MG/DL — SIGNIFICANT CHANGE UP (ref 8.4–10.5)
CHLORIDE SERPL-SCNC: 103 MMOL/L — SIGNIFICANT CHANGE UP (ref 98–110)
CO2 SERPL-SCNC: 22 MMOL/L — SIGNIFICANT CHANGE UP (ref 17–32)
CREAT SERPL-MCNC: 1.6 MG/DL — HIGH (ref 0.7–1.5)
EGFR: 42 ML/MIN/1.73M2 — LOW
GLUCOSE SERPL-MCNC: 114 MG/DL — HIGH (ref 70–99)
HCT VFR BLD CALC: 37.7 % — LOW (ref 42–52)
HGB BLD-MCNC: 12 G/DL — LOW (ref 14–18)
MAGNESIUM SERPL-MCNC: 1.8 MG/DL — SIGNIFICANT CHANGE UP (ref 1.8–2.4)
MCHC RBC-ENTMCNC: 27.5 PG — SIGNIFICANT CHANGE UP (ref 27–31)
MCHC RBC-ENTMCNC: 31.8 G/DL — LOW (ref 32–37)
MCV RBC AUTO: 86.3 FL — SIGNIFICANT CHANGE UP (ref 80–94)
NRBC # BLD: 0 /100 WBCS — SIGNIFICANT CHANGE UP (ref 0–0)
PLATELET # BLD AUTO: 148 K/UL — SIGNIFICANT CHANGE UP (ref 130–400)
PMV BLD: 11.9 FL — HIGH (ref 7.4–10.4)
POTASSIUM SERPL-MCNC: 4.4 MMOL/L — SIGNIFICANT CHANGE UP (ref 3.5–5)
POTASSIUM SERPL-SCNC: 4.4 MMOL/L — SIGNIFICANT CHANGE UP (ref 3.5–5)
RBC # BLD: 4.37 M/UL — LOW (ref 4.7–6.1)
RBC # FLD: 14.5 % — SIGNIFICANT CHANGE UP (ref 11.5–14.5)
SODIUM SERPL-SCNC: 138 MMOL/L — SIGNIFICANT CHANGE UP (ref 135–146)
WBC # BLD: 10.18 K/UL — SIGNIFICANT CHANGE UP (ref 4.8–10.8)
WBC # FLD AUTO: 10.18 K/UL — SIGNIFICANT CHANGE UP (ref 4.8–10.8)

## 2023-10-04 PROCEDURE — 99233 SBSQ HOSP IP/OBS HIGH 50: CPT

## 2023-10-04 RX ORDER — OXYCODONE AND ACETAMINOPHEN 5; 325 MG/1; MG/1
1 TABLET ORAL EVERY 4 HOURS
Refills: 0 | Status: DISCONTINUED | OUTPATIENT
Start: 2023-10-04 | End: 2023-10-04

## 2023-10-04 RX ORDER — SODIUM CHLORIDE 9 MG/ML
1000 INJECTION INTRAMUSCULAR; INTRAVENOUS; SUBCUTANEOUS
Refills: 0 | Status: DISCONTINUED | OUTPATIENT
Start: 2023-10-04 | End: 2023-10-05

## 2023-10-04 RX ORDER — APIXABAN 2.5 MG/1
2 TABLET, FILM COATED ORAL
Qty: 90 | Refills: 0
Start: 2023-10-04 | End: 2023-10-10

## 2023-10-04 RX ORDER — MORPHINE SULFATE 50 MG/1
2 CAPSULE, EXTENDED RELEASE ORAL EVERY 4 HOURS
Refills: 0 | Status: DISCONTINUED | OUTPATIENT
Start: 2023-10-04 | End: 2023-10-04

## 2023-10-04 RX ADMIN — METHOCARBAMOL 1500 MILLIGRAM(S): 500 TABLET, FILM COATED ORAL at 13:18

## 2023-10-04 RX ADMIN — METHOCARBAMOL 1500 MILLIGRAM(S): 500 TABLET, FILM COATED ORAL at 05:32

## 2023-10-04 RX ADMIN — Medication 81 MILLIGRAM(S): at 11:37

## 2023-10-04 RX ADMIN — ENOXAPARIN SODIUM 70 MILLIGRAM(S): 100 INJECTION SUBCUTANEOUS at 17:39

## 2023-10-04 RX ADMIN — Medication 48 MILLIGRAM(S): at 11:43

## 2023-10-04 RX ADMIN — Medication 650 MILLIGRAM(S): at 05:33

## 2023-10-04 RX ADMIN — SODIUM CHLORIDE 75 MILLILITER(S): 9 INJECTION INTRAMUSCULAR; INTRAVENOUS; SUBCUTANEOUS at 18:13

## 2023-10-04 RX ADMIN — TAMSULOSIN HYDROCHLORIDE 0.4 MILLIGRAM(S): 0.4 CAPSULE ORAL at 11:37

## 2023-10-04 RX ADMIN — CARVEDILOL PHOSPHATE 3.12 MILLIGRAM(S): 80 CAPSULE, EXTENDED RELEASE ORAL at 05:33

## 2023-10-04 RX ADMIN — METHOCARBAMOL 1500 MILLIGRAM(S): 500 TABLET, FILM COATED ORAL at 21:05

## 2023-10-04 RX ADMIN — FINASTERIDE 5 MILLIGRAM(S): 5 TABLET, FILM COATED ORAL at 11:37

## 2023-10-04 RX ADMIN — ENOXAPARIN SODIUM 70 MILLIGRAM(S): 100 INJECTION SUBCUTANEOUS at 05:33
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
## 2023-10-05 LAB
ANION GAP SERPL CALC-SCNC: 13 MMOL/L — SIGNIFICANT CHANGE UP (ref 7–14)
BUN SERPL-MCNC: 29 MG/DL — HIGH (ref 10–20)
CALCIUM SERPL-MCNC: 8.5 MG/DL — SIGNIFICANT CHANGE UP (ref 8.4–10.5)
CHLORIDE SERPL-SCNC: 101 MMOL/L — SIGNIFICANT CHANGE UP (ref 98–110)
CO2 SERPL-SCNC: 23 MMOL/L — SIGNIFICANT CHANGE UP (ref 17–32)
CREAT SERPL-MCNC: 1.3 MG/DL — SIGNIFICANT CHANGE UP (ref 0.7–1.5)
EGFR: 54 ML/MIN/1.73M2 — LOW
GLUCOSE SERPL-MCNC: 116 MG/DL — HIGH (ref 70–99)
HCT VFR BLD CALC: 36.7 % — LOW (ref 42–52)
HGB BLD-MCNC: 11.7 G/DL — LOW (ref 14–18)
MCHC RBC-ENTMCNC: 27.2 PG — SIGNIFICANT CHANGE UP (ref 27–31)
MCHC RBC-ENTMCNC: 31.9 G/DL — LOW (ref 32–37)
MCV RBC AUTO: 85.3 FL — SIGNIFICANT CHANGE UP (ref 80–94)
NRBC # BLD: 0 /100 WBCS — SIGNIFICANT CHANGE UP (ref 0–0)
PLATELET # BLD AUTO: 152 K/UL — SIGNIFICANT CHANGE UP (ref 130–400)
PMV BLD: 11.5 FL — HIGH (ref 7.4–10.4)
POTASSIUM SERPL-MCNC: 4.2 MMOL/L — SIGNIFICANT CHANGE UP (ref 3.5–5)
POTASSIUM SERPL-SCNC: 4.2 MMOL/L — SIGNIFICANT CHANGE UP (ref 3.5–5)
RBC # BLD: 4.3 M/UL — LOW (ref 4.7–6.1)
RBC # FLD: 14.4 % — SIGNIFICANT CHANGE UP (ref 11.5–14.5)
SODIUM SERPL-SCNC: 137 MMOL/L — SIGNIFICANT CHANGE UP (ref 135–146)
WBC # BLD: 10.49 K/UL — SIGNIFICANT CHANGE UP (ref 4.8–10.8)
WBC # FLD AUTO: 10.49 K/UL — SIGNIFICANT CHANGE UP (ref 4.8–10.8)

## 2023-10-05 PROCEDURE — 99232 SBSQ HOSP IP/OBS MODERATE 35: CPT

## 2023-10-05 PROCEDURE — 99223 1ST HOSP IP/OBS HIGH 75: CPT

## 2023-10-05 RX ORDER — RIVAROXABAN 15 MG-20MG
1 KIT ORAL
Qty: 60 | Refills: 0
Start: 2023-10-05 | End: 2023-12-03

## 2023-10-05 RX ORDER — RIVAROXABAN 15 MG-20MG
1 KIT ORAL
Qty: 42 | Refills: 0
Start: 2023-10-05 | End: 2023-10-25

## 2023-10-05 RX ORDER — MORPHINE SULFATE 50 MG/1
2 CAPSULE, EXTENDED RELEASE ORAL EVERY 4 HOURS
Refills: 0 | Status: DISCONTINUED | OUTPATIENT
Start: 2023-10-05 | End: 2023-10-11

## 2023-10-05 RX ORDER — SODIUM CHLORIDE 9 MG/ML
1000 INJECTION INTRAMUSCULAR; INTRAVENOUS; SUBCUTANEOUS
Refills: 0 | Status: DISCONTINUED | OUTPATIENT
Start: 2023-10-05 | End: 2023-10-11

## 2023-10-05 RX ADMIN — TAMSULOSIN HYDROCHLORIDE 0.4 MILLIGRAM(S): 0.4 CAPSULE ORAL at 11:04

## 2023-10-05 RX ADMIN — Medication 81 MILLIGRAM(S): at 11:04

## 2023-10-05 RX ADMIN — ENOXAPARIN SODIUM 70 MILLIGRAM(S): 100 INJECTION SUBCUTANEOUS at 17:12

## 2023-10-05 RX ADMIN — Medication 48 MILLIGRAM(S): at 11:04

## 2023-10-05 RX ADMIN — METHOCARBAMOL 1500 MILLIGRAM(S): 500 TABLET, FILM COATED ORAL at 13:15

## 2023-10-05 RX ADMIN — ENOXAPARIN SODIUM 70 MILLIGRAM(S): 100 INJECTION SUBCUTANEOUS at 06:36

## 2023-10-05 RX ADMIN — FINASTERIDE 5 MILLIGRAM(S): 5 TABLET, FILM COATED ORAL at 11:04

## 2023-10-05 RX ADMIN — CARVEDILOL PHOSPHATE 3.12 MILLIGRAM(S): 80 CAPSULE, EXTENDED RELEASE ORAL at 06:36

## 2023-10-05 RX ADMIN — METHOCARBAMOL 1500 MILLIGRAM(S): 500 TABLET, FILM COATED ORAL at 22:32

## 2023-10-05 RX ADMIN — METHOCARBAMOL 1500 MILLIGRAM(S): 500 TABLET, FILM COATED ORAL at 06:36
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
## 2023-10-06 LAB
ANION GAP SERPL CALC-SCNC: 12 MMOL/L — SIGNIFICANT CHANGE UP (ref 7–14)
BUN SERPL-MCNC: 30 MG/DL — HIGH (ref 10–20)
CALCIUM SERPL-MCNC: 8.7 MG/DL — SIGNIFICANT CHANGE UP (ref 8.4–10.5)
CHLORIDE SERPL-SCNC: 103 MMOL/L — SIGNIFICANT CHANGE UP (ref 98–110)
CO2 SERPL-SCNC: 24 MMOL/L — SIGNIFICANT CHANGE UP (ref 17–32)
CREAT SERPL-MCNC: 1.4 MG/DL — SIGNIFICANT CHANGE UP (ref 0.7–1.5)
EGFR: 50 ML/MIN/1.73M2 — LOW
GLUCOSE SERPL-MCNC: 105 MG/DL — HIGH (ref 70–99)
HCT VFR BLD CALC: 35.5 % — LOW (ref 42–52)
HGB BLD-MCNC: 11.4 G/DL — LOW (ref 14–18)
MCHC RBC-ENTMCNC: 27.2 PG — SIGNIFICANT CHANGE UP (ref 27–31)
MCHC RBC-ENTMCNC: 32.1 G/DL — SIGNIFICANT CHANGE UP (ref 32–37)
MCV RBC AUTO: 84.7 FL — SIGNIFICANT CHANGE UP (ref 80–94)
NRBC # BLD: 0 /100 WBCS — SIGNIFICANT CHANGE UP (ref 0–0)
PLATELET # BLD AUTO: 163 K/UL — SIGNIFICANT CHANGE UP (ref 130–400)
PMV BLD: 11 FL — HIGH (ref 7.4–10.4)
POTASSIUM SERPL-MCNC: 4.3 MMOL/L — SIGNIFICANT CHANGE UP (ref 3.5–5)
POTASSIUM SERPL-SCNC: 4.3 MMOL/L — SIGNIFICANT CHANGE UP (ref 3.5–5)
RBC # BLD: 4.19 M/UL — LOW (ref 4.7–6.1)
RBC # FLD: 14.3 % — SIGNIFICANT CHANGE UP (ref 11.5–14.5)
SODIUM SERPL-SCNC: 139 MMOL/L — SIGNIFICANT CHANGE UP (ref 135–146)
WBC # BLD: 8.33 K/UL — SIGNIFICANT CHANGE UP (ref 4.8–10.8)
WBC # FLD AUTO: 8.33 K/UL — SIGNIFICANT CHANGE UP (ref 4.8–10.8)

## 2023-10-06 PROCEDURE — 99232 SBSQ HOSP IP/OBS MODERATE 35: CPT

## 2023-10-06 RX ORDER — ACETAMINOPHEN 500 MG
975 TABLET ORAL EVERY 8 HOURS
Refills: 0 | Status: DISCONTINUED | OUTPATIENT
Start: 2023-10-06 | End: 2023-10-11

## 2023-10-06 RX ORDER — METHOCARBAMOL 500 MG/1
750 TABLET, FILM COATED ORAL EVERY 8 HOURS
Refills: 0 | Status: DISCONTINUED | OUTPATIENT
Start: 2023-10-06 | End: 2023-10-11

## 2023-10-06 RX ORDER — RIVAROXABAN 15 MG-20MG
15 KIT ORAL
Qty: 51 | Refills: 0
Start: 2023-10-06 | End: 2023-11-04

## 2023-10-06 RX ORDER — EDOXABAN TOSYLATE 30 MG/1
1 TABLET, FILM COATED ORAL
Qty: 90 | Refills: 0
Start: 2023-10-06 | End: 2024-01-03

## 2023-10-06 RX ORDER — OXYCODONE HYDROCHLORIDE 5 MG/1
5 TABLET ORAL EVERY 6 HOURS
Refills: 0 | Status: DISCONTINUED | OUTPATIENT
Start: 2023-10-06 | End: 2023-10-11

## 2023-10-06 RX ADMIN — ENOXAPARIN SODIUM 70 MILLIGRAM(S): 100 INJECTION SUBCUTANEOUS at 17:00

## 2023-10-06 RX ADMIN — METHOCARBAMOL 750 MILLIGRAM(S): 500 TABLET, FILM COATED ORAL at 21:11

## 2023-10-06 RX ADMIN — CARVEDILOL PHOSPHATE 3.12 MILLIGRAM(S): 80 CAPSULE, EXTENDED RELEASE ORAL at 07:02

## 2023-10-06 RX ADMIN — TAMSULOSIN HYDROCHLORIDE 0.4 MILLIGRAM(S): 0.4 CAPSULE ORAL at 11:21

## 2023-10-06 RX ADMIN — Medication 975 MILLIGRAM(S): at 13:01

## 2023-10-06 RX ADMIN — ENOXAPARIN SODIUM 70 MILLIGRAM(S): 100 INJECTION SUBCUTANEOUS at 07:03

## 2023-10-06 RX ADMIN — METHOCARBAMOL 1500 MILLIGRAM(S): 500 TABLET, FILM COATED ORAL at 07:01

## 2023-10-06 RX ADMIN — Medication 81 MILLIGRAM(S): at 11:21

## 2023-10-06 RX ADMIN — Medication 975 MILLIGRAM(S): at 21:50

## 2023-10-06 RX ADMIN — Medication 48 MILLIGRAM(S): at 11:21

## 2023-10-06 RX ADMIN — FINASTERIDE 5 MILLIGRAM(S): 5 TABLET, FILM COATED ORAL at 11:21

## 2023-10-06 RX ADMIN — METHOCARBAMOL 750 MILLIGRAM(S): 500 TABLET, FILM COATED ORAL at 13:01

## 2023-10-06 RX ADMIN — Medication 975 MILLIGRAM(S): at 14:10

## 2023-10-06 RX ADMIN — Medication 975 MILLIGRAM(S): at 21:11
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
## 2023-10-07 LAB
ANION GAP SERPL CALC-SCNC: 14 MMOL/L — SIGNIFICANT CHANGE UP (ref 7–14)
BUN SERPL-MCNC: 35 MG/DL — HIGH (ref 10–20)
CALCIUM SERPL-MCNC: 8.8 MG/DL — SIGNIFICANT CHANGE UP (ref 8.4–10.5)
CHLORIDE SERPL-SCNC: 103 MMOL/L — SIGNIFICANT CHANGE UP (ref 98–110)
CO2 SERPL-SCNC: 22 MMOL/L — SIGNIFICANT CHANGE UP (ref 17–32)
CREAT SERPL-MCNC: 1.3 MG/DL — SIGNIFICANT CHANGE UP (ref 0.7–1.5)
EGFR: 54 ML/MIN/1.73M2 — LOW
GLUCOSE SERPL-MCNC: 102 MG/DL — HIGH (ref 70–99)
HADV DNA SPEC QL NAA+PROBE: DETECTED
HCT VFR BLD CALC: 35.7 % — LOW (ref 42–52)
HGB BLD-MCNC: 11.5 G/DL — LOW (ref 14–18)
MCHC RBC-ENTMCNC: 27.4 PG — SIGNIFICANT CHANGE UP (ref 27–31)
MCHC RBC-ENTMCNC: 32.2 G/DL — SIGNIFICANT CHANGE UP (ref 32–37)
MCV RBC AUTO: 85 FL — SIGNIFICANT CHANGE UP (ref 80–94)
NRBC # BLD: 0 /100 WBCS — SIGNIFICANT CHANGE UP (ref 0–0)
PLATELET # BLD AUTO: 180 K/UL — SIGNIFICANT CHANGE UP (ref 130–400)
PMV BLD: 11.3 FL — HIGH (ref 7.4–10.4)
POTASSIUM SERPL-MCNC: 4.4 MMOL/L — SIGNIFICANT CHANGE UP (ref 3.5–5)
POTASSIUM SERPL-SCNC: 4.4 MMOL/L — SIGNIFICANT CHANGE UP (ref 3.5–5)
RAPID RVP RESULT: DETECTED
RBC # BLD: 4.2 M/UL — LOW (ref 4.7–6.1)
RBC # FLD: 14 % — SIGNIFICANT CHANGE UP (ref 11.5–14.5)
SARS-COV-2 RNA SPEC QL NAA+PROBE: SIGNIFICANT CHANGE UP
SODIUM SERPL-SCNC: 139 MMOL/L — SIGNIFICANT CHANGE UP (ref 135–146)
WBC # BLD: 6.38 K/UL — SIGNIFICANT CHANGE UP (ref 4.8–10.8)
WBC # FLD AUTO: 6.38 K/UL — SIGNIFICANT CHANGE UP (ref 4.8–10.8)

## 2023-10-07 PROCEDURE — 99232 SBSQ HOSP IP/OBS MODERATE 35: CPT

## 2023-10-07 RX ORDER — GUAIFENESIN/DEXTROMETHORPHAN 600MG-30MG
10 TABLET, EXTENDED RELEASE 12 HR ORAL EVERY 4 HOURS
Refills: 0 | Status: DISCONTINUED | OUTPATIENT
Start: 2023-10-07 | End: 2023-10-11

## 2023-10-07 RX ORDER — ENOXAPARIN SODIUM 100 MG/ML
100 INJECTION SUBCUTANEOUS
Qty: 90 | Refills: 0
Start: 2023-10-07 | End: 2023-11-05

## 2023-10-07 RX ADMIN — Medication 975 MILLIGRAM(S): at 05:40

## 2023-10-07 RX ADMIN — CARVEDILOL PHOSPHATE 3.12 MILLIGRAM(S): 80 CAPSULE, EXTENDED RELEASE ORAL at 05:40

## 2023-10-07 RX ADMIN — Medication 81 MILLIGRAM(S): at 12:26

## 2023-10-07 RX ADMIN — FINASTERIDE 5 MILLIGRAM(S): 5 TABLET, FILM COATED ORAL at 12:26

## 2023-10-07 RX ADMIN — ENOXAPARIN SODIUM 70 MILLIGRAM(S): 100 INJECTION SUBCUTANEOUS at 18:25

## 2023-10-07 RX ADMIN — TAMSULOSIN HYDROCHLORIDE 0.4 MILLIGRAM(S): 0.4 CAPSULE ORAL at 12:26

## 2023-10-07 RX ADMIN — Medication 975 MILLIGRAM(S): at 06:20

## 2023-10-07 RX ADMIN — METHOCARBAMOL 750 MILLIGRAM(S): 500 TABLET, FILM COATED ORAL at 05:41

## 2023-10-07 RX ADMIN — Medication 975 MILLIGRAM(S): at 18:25

## 2023-10-07 RX ADMIN — Medication 48 MILLIGRAM(S): at 12:26

## 2023-10-07 RX ADMIN — Medication 10 MILLILITER(S): at 23:05

## 2023-10-07 RX ADMIN — METHOCARBAMOL 750 MILLIGRAM(S): 500 TABLET, FILM COATED ORAL at 18:25

## 2023-10-07 RX ADMIN — ENOXAPARIN SODIUM 70 MILLIGRAM(S): 100 INJECTION SUBCUTANEOUS at 05:40
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
## 2023-10-08 LAB
ANION GAP SERPL CALC-SCNC: 13 MMOL/L — SIGNIFICANT CHANGE UP (ref 7–14)
BUN SERPL-MCNC: 32 MG/DL — HIGH (ref 10–20)
CALCIUM SERPL-MCNC: 8.8 MG/DL — SIGNIFICANT CHANGE UP (ref 8.4–10.5)
CHLORIDE SERPL-SCNC: 102 MMOL/L — SIGNIFICANT CHANGE UP (ref 98–110)
CO2 SERPL-SCNC: 23 MMOL/L — SIGNIFICANT CHANGE UP (ref 17–32)
CREAT SERPL-MCNC: 1.1 MG/DL — SIGNIFICANT CHANGE UP (ref 0.7–1.5)
EGFR: 66 ML/MIN/1.73M2 — SIGNIFICANT CHANGE UP
GLUCOSE SERPL-MCNC: 111 MG/DL — HIGH (ref 70–99)
HCT VFR BLD CALC: 37.6 % — LOW (ref 42–52)
HGB BLD-MCNC: 12.1 G/DL — LOW (ref 14–18)
MCHC RBC-ENTMCNC: 27.3 PG — SIGNIFICANT CHANGE UP (ref 27–31)
MCHC RBC-ENTMCNC: 32.2 G/DL — SIGNIFICANT CHANGE UP (ref 32–37)
MCV RBC AUTO: 84.7 FL — SIGNIFICANT CHANGE UP (ref 80–94)
NRBC # BLD: 0 /100 WBCS — SIGNIFICANT CHANGE UP (ref 0–0)
PLATELET # BLD AUTO: 217 K/UL — SIGNIFICANT CHANGE UP (ref 130–400)
PMV BLD: 10.6 FL — HIGH (ref 7.4–10.4)
POTASSIUM SERPL-MCNC: 4.3 MMOL/L — SIGNIFICANT CHANGE UP (ref 3.5–5)
POTASSIUM SERPL-SCNC: 4.3 MMOL/L — SIGNIFICANT CHANGE UP (ref 3.5–5)
RBC # BLD: 4.44 M/UL — LOW (ref 4.7–6.1)
RBC # FLD: 13.8 % — SIGNIFICANT CHANGE UP (ref 11.5–14.5)
SODIUM SERPL-SCNC: 138 MMOL/L — SIGNIFICANT CHANGE UP (ref 135–146)
WBC # BLD: 6.06 K/UL — SIGNIFICANT CHANGE UP (ref 4.8–10.8)
WBC # FLD AUTO: 6.06 K/UL — SIGNIFICANT CHANGE UP (ref 4.8–10.8)

## 2023-10-08 PROCEDURE — 99232 SBSQ HOSP IP/OBS MODERATE 35: CPT

## 2023-10-08 RX ADMIN — Medication 81 MILLIGRAM(S): at 12:12

## 2023-10-08 RX ADMIN — ENOXAPARIN SODIUM 70 MILLIGRAM(S): 100 INJECTION SUBCUTANEOUS at 18:22

## 2023-10-08 RX ADMIN — TAMSULOSIN HYDROCHLORIDE 0.4 MILLIGRAM(S): 0.4 CAPSULE ORAL at 12:12

## 2023-10-08 RX ADMIN — METHOCARBAMOL 750 MILLIGRAM(S): 500 TABLET, FILM COATED ORAL at 05:35

## 2023-10-08 RX ADMIN — ENOXAPARIN SODIUM 70 MILLIGRAM(S): 100 INJECTION SUBCUTANEOUS at 05:35

## 2023-10-08 RX ADMIN — Medication 48 MILLIGRAM(S): at 12:12

## 2023-10-08 RX ADMIN — CARVEDILOL PHOSPHATE 3.12 MILLIGRAM(S): 80 CAPSULE, EXTENDED RELEASE ORAL at 05:35

## 2023-10-08 RX ADMIN — FINASTERIDE 5 MILLIGRAM(S): 5 TABLET, FILM COATED ORAL at 12:12

## 2023-10-08 RX ADMIN — METHOCARBAMOL 750 MILLIGRAM(S): 500 TABLET, FILM COATED ORAL at 13:37

## 2023-10-08 RX ADMIN — Medication 10 MILLILITER(S): at 20:17

## 2023-10-08 RX ADMIN — Medication 975 MILLIGRAM(S): at 13:37

## 2023-10-08 RX ADMIN — Medication 10 MILLILITER(S): at 05:36

## 2023-10-08 RX ADMIN — METHOCARBAMOL 750 MILLIGRAM(S): 500 TABLET, FILM COATED ORAL at 21:01
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
## 2023-10-09 ENCOUNTER — TRANSCRIPTION ENCOUNTER (OUTPATIENT)
Age: 83
End: 2023-10-09

## 2023-10-09 LAB
ANION GAP SERPL CALC-SCNC: 10 MMOL/L — SIGNIFICANT CHANGE UP (ref 7–14)
BUN SERPL-MCNC: 34 MG/DL — HIGH (ref 10–20)
CALCIUM SERPL-MCNC: 9.2 MG/DL — SIGNIFICANT CHANGE UP (ref 8.4–10.5)
CHLORIDE SERPL-SCNC: 103 MMOL/L — SIGNIFICANT CHANGE UP (ref 98–110)
CO2 SERPL-SCNC: 24 MMOL/L — SIGNIFICANT CHANGE UP (ref 17–32)
CREAT SERPL-MCNC: 1.2 MG/DL — SIGNIFICANT CHANGE UP (ref 0.7–1.5)
EGFR: 60 ML/MIN/1.73M2 — SIGNIFICANT CHANGE UP
GLUCOSE SERPL-MCNC: 112 MG/DL — HIGH (ref 70–99)
HCT VFR BLD CALC: 37.9 % — LOW (ref 42–52)
HGB BLD-MCNC: 12.2 G/DL — LOW (ref 14–18)
MCHC RBC-ENTMCNC: 26.8 PG — LOW (ref 27–31)
MCHC RBC-ENTMCNC: 32.2 G/DL — SIGNIFICANT CHANGE UP (ref 32–37)
MCV RBC AUTO: 83.1 FL — SIGNIFICANT CHANGE UP (ref 80–94)
NRBC # BLD: 0 /100 WBCS — SIGNIFICANT CHANGE UP (ref 0–0)
PLATELET # BLD AUTO: 254 K/UL — SIGNIFICANT CHANGE UP (ref 130–400)
PMV BLD: 10.8 FL — HIGH (ref 7.4–10.4)
POTASSIUM SERPL-MCNC: 4.3 MMOL/L — SIGNIFICANT CHANGE UP (ref 3.5–5)
POTASSIUM SERPL-SCNC: 4.3 MMOL/L — SIGNIFICANT CHANGE UP (ref 3.5–5)
RBC # BLD: 4.56 M/UL — LOW (ref 4.7–6.1)
RBC # FLD: 13.7 % — SIGNIFICANT CHANGE UP (ref 11.5–14.5)
SODIUM SERPL-SCNC: 137 MMOL/L — SIGNIFICANT CHANGE UP (ref 135–146)
WBC # BLD: 5.87 K/UL — SIGNIFICANT CHANGE UP (ref 4.8–10.8)
WBC # FLD AUTO: 5.87 K/UL — SIGNIFICANT CHANGE UP (ref 4.8–10.8)

## 2023-10-09 PROCEDURE — 99231 SBSQ HOSP IP/OBS SF/LOW 25: CPT

## 2023-10-09 RX ORDER — APIXABAN 2.5 MG/1
10 TABLET, FILM COATED ORAL EVERY 12 HOURS
Refills: 0 | Status: DISCONTINUED | OUTPATIENT
Start: 2023-10-09 | End: 2023-10-11

## 2023-10-09 RX ADMIN — Medication 975 MILLIGRAM(S): at 22:15

## 2023-10-09 RX ADMIN — Medication 975 MILLIGRAM(S): at 23:00

## 2023-10-09 RX ADMIN — Medication 81 MILLIGRAM(S): at 11:40

## 2023-10-09 RX ADMIN — ENOXAPARIN SODIUM 70 MILLIGRAM(S): 100 INJECTION SUBCUTANEOUS at 05:40

## 2023-10-09 RX ADMIN — FINASTERIDE 5 MILLIGRAM(S): 5 TABLET, FILM COATED ORAL at 11:39

## 2023-10-09 RX ADMIN — CARVEDILOL PHOSPHATE 3.12 MILLIGRAM(S): 80 CAPSULE, EXTENDED RELEASE ORAL at 05:40

## 2023-10-09 RX ADMIN — Medication 975 MILLIGRAM(S): at 13:06

## 2023-10-09 RX ADMIN — METHOCARBAMOL 750 MILLIGRAM(S): 500 TABLET, FILM COATED ORAL at 13:06

## 2023-10-09 RX ADMIN — Medication 48 MILLIGRAM(S): at 11:39

## 2023-10-09 RX ADMIN — APIXABAN 10 MILLIGRAM(S): 2.5 TABLET, FILM COATED ORAL at 17:04

## 2023-10-09 RX ADMIN — METHOCARBAMOL 750 MILLIGRAM(S): 500 TABLET, FILM COATED ORAL at 22:18

## 2023-10-09 RX ADMIN — Medication 10 MILLILITER(S): at 20:14

## 2023-10-09 RX ADMIN — TAMSULOSIN HYDROCHLORIDE 0.4 MILLIGRAM(S): 0.4 CAPSULE ORAL at 11:39

## 2023-10-09 RX ADMIN — Medication 10 MILLILITER(S): at 01:43

## 2023-10-09 RX ADMIN — Medication 10 MILLILITER(S): at 05:44

## 2023-10-09 NOTE — DISCHARGE NOTE PROVIDER - NSDCMRMEDTOKEN_GEN_ALL_CORE_FT
aspirin 81 mg oral capsule: 1 cap(s) orally once a day  atorvastatin 80 mg oral tablet: 1 tab(s) orally once a day (at bedtime)  carvedilol 3.125 mg oral tablet: 1 tab(s) orally once a day  fenofibrate 48 mg oral tablet: 1 tab(s) orally once a day  finasteride 5 mg oral tablet: 1 tab(s) orally once a day  Flomax 0.4 mg oral capsule: 1 cap(s) orally once a day   Lovenox 100 mg/mL injectable solution: 100 milligram(s) subcutaneously once a day  Xarelto Starter Pack 15 mg-20 mg oral kit: 15 milligram(s) orally 2 times a day 15mg two times a day x21 days, then one tab of 20mg once a day

## 2023-10-09 NOTE — DISCHARGE NOTE PROVIDER - CARE PROVIDER_API CALL
Lupillo Boo  Internal Medicine  [address] [place] [address]  Phone: [phone]  Fax: [phone]  Established Patient  Follow Up Time: 2 weeks
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
## 2023-10-09 NOTE — DISCHARGE NOTE PROVIDER - NSDCFUSCHEDAPPT_GEN_ALL_CORE_FT
Aris Botello  [facility]  UROLOGY [address]  Scheduled Appointment: 11/21/2023    [facility]  CARDIOLOGY [address]  Scheduled Appointment: 12/29/2023

## 2023-10-10 LAB
ALBUMIN SERPL ELPH-MCNC: 3.4 G/DL — LOW (ref 3.5–5.2)
ALP SERPL-CCNC: 73 U/L — SIGNIFICANT CHANGE UP (ref 30–115)
ALT FLD-CCNC: 23 U/L — SIGNIFICANT CHANGE UP (ref 0–41)
ANION GAP SERPL CALC-SCNC: 14 MMOL/L — SIGNIFICANT CHANGE UP (ref 7–14)
AST SERPL-CCNC: 26 U/L — SIGNIFICANT CHANGE UP (ref 0–41)
BASOPHILS # BLD AUTO: 0.05 K/UL — SIGNIFICANT CHANGE UP (ref 0–0.2)
BASOPHILS NFR BLD AUTO: 0.9 % — SIGNIFICANT CHANGE UP (ref 0–1)
BILIRUB SERPL-MCNC: 0.5 MG/DL — SIGNIFICANT CHANGE UP (ref 0.2–1.2)
BUN SERPL-MCNC: 32 MG/DL — HIGH (ref 10–20)
CALCIUM SERPL-MCNC: 9.3 MG/DL — SIGNIFICANT CHANGE UP (ref 8.4–10.4)
CHLORIDE SERPL-SCNC: 103 MMOL/L — SIGNIFICANT CHANGE UP (ref 98–110)
CO2 SERPL-SCNC: 22 MMOL/L — SIGNIFICANT CHANGE UP (ref 17–32)
CREAT SERPL-MCNC: 1.2 MG/DL — SIGNIFICANT CHANGE UP (ref 0.7–1.5)
EGFR: 60 ML/MIN/1.73M2 — SIGNIFICANT CHANGE UP
EOSINOPHIL # BLD AUTO: 0.39 K/UL — SIGNIFICANT CHANGE UP (ref 0–0.7)
EOSINOPHIL NFR BLD AUTO: 7.1 % — SIGNIFICANT CHANGE UP (ref 0–8)
GLUCOSE SERPL-MCNC: 104 MG/DL — HIGH (ref 70–99)
HCT VFR BLD CALC: 38.6 % — LOW (ref 42–52)
HGB BLD-MCNC: 12.3 G/DL — LOW (ref 14–18)
IMM GRANULOCYTES NFR BLD AUTO: 0.4 % — HIGH (ref 0.1–0.3)
LYMPHOCYTES # BLD AUTO: 1.42 K/UL — SIGNIFICANT CHANGE UP (ref 1.2–3.4)
LYMPHOCYTES # BLD AUTO: 25.7 % — SIGNIFICANT CHANGE UP (ref 20.5–51.1)
MAGNESIUM SERPL-MCNC: 2 MG/DL — SIGNIFICANT CHANGE UP (ref 1.8–2.4)
MCHC RBC-ENTMCNC: 26.5 PG — LOW (ref 27–31)
MCHC RBC-ENTMCNC: 31.9 G/DL — LOW (ref 32–37)
MCV RBC AUTO: 83.2 FL — SIGNIFICANT CHANGE UP (ref 80–94)
MONOCYTES # BLD AUTO: 0.59 K/UL — SIGNIFICANT CHANGE UP (ref 0.1–0.6)
MONOCYTES NFR BLD AUTO: 10.7 % — HIGH (ref 1.7–9.3)
NEUTROPHILS # BLD AUTO: 3.05 K/UL — SIGNIFICANT CHANGE UP (ref 1.4–6.5)
NEUTROPHILS NFR BLD AUTO: 55.2 % — SIGNIFICANT CHANGE UP (ref 42.2–75.2)
NRBC # BLD: 0 /100 WBCS — SIGNIFICANT CHANGE UP (ref 0–0)
PLATELET # BLD AUTO: 274 K/UL — SIGNIFICANT CHANGE UP (ref 130–400)
PMV BLD: 10.4 FL — SIGNIFICANT CHANGE UP (ref 7.4–10.4)
POTASSIUM SERPL-MCNC: 4.6 MMOL/L — SIGNIFICANT CHANGE UP (ref 3.5–5)
POTASSIUM SERPL-SCNC: 4.6 MMOL/L — SIGNIFICANT CHANGE UP (ref 3.5–5)
PROT SERPL-MCNC: 6.4 G/DL — SIGNIFICANT CHANGE UP (ref 6–8)
RBC # BLD: 4.64 M/UL — LOW (ref 4.7–6.1)
RBC # FLD: 13.8 % — SIGNIFICANT CHANGE UP (ref 11.5–14.5)
SODIUM SERPL-SCNC: 139 MMOL/L — SIGNIFICANT CHANGE UP (ref 135–146)
WBC # BLD: 5.52 K/UL — SIGNIFICANT CHANGE UP (ref 4.8–10.8)
WBC # FLD AUTO: 5.52 K/UL — SIGNIFICANT CHANGE UP (ref 4.8–10.8)

## 2023-10-10 PROCEDURE — 99231 SBSQ HOSP IP/OBS SF/LOW 25: CPT

## 2023-10-10 RX ORDER — CHLORHEXIDINE GLUCONATE 213 G/1000ML
1 SOLUTION TOPICAL DAILY
Refills: 0 | Status: DISCONTINUED | OUTPATIENT
Start: 2023-10-10 | End: 2023-10-11

## 2023-10-10 RX ADMIN — CARVEDILOL PHOSPHATE 3.12 MILLIGRAM(S): 80 CAPSULE, EXTENDED RELEASE ORAL at 05:49

## 2023-10-10 RX ADMIN — APIXABAN 10 MILLIGRAM(S): 2.5 TABLET, FILM COATED ORAL at 17:01

## 2023-10-10 RX ADMIN — TAMSULOSIN HYDROCHLORIDE 0.4 MILLIGRAM(S): 0.4 CAPSULE ORAL at 11:21

## 2023-10-10 RX ADMIN — Medication 48 MILLIGRAM(S): at 13:03

## 2023-10-10 RX ADMIN — Medication 975 MILLIGRAM(S): at 06:41

## 2023-10-10 RX ADMIN — Medication 81 MILLIGRAM(S): at 11:21

## 2023-10-10 RX ADMIN — Medication 10 MILLILITER(S): at 05:50

## 2023-10-10 RX ADMIN — APIXABAN 10 MILLIGRAM(S): 2.5 TABLET, FILM COATED ORAL at 05:50

## 2023-10-10 RX ADMIN — CHLORHEXIDINE GLUCONATE 1 APPLICATION(S): 213 SOLUTION TOPICAL at 11:25

## 2023-10-10 RX ADMIN — Medication 975 MILLIGRAM(S): at 05:49

## 2023-10-10 RX ADMIN — Medication 975 MILLIGRAM(S): at 13:03

## 2023-10-10 RX ADMIN — METHOCARBAMOL 750 MILLIGRAM(S): 500 TABLET, FILM COATED ORAL at 05:50

## 2023-10-10 RX ADMIN — FINASTERIDE 5 MILLIGRAM(S): 5 TABLET, FILM COATED ORAL at 11:21

## 2023-10-10 RX ADMIN — Medication 10 MILLILITER(S): at 13:39

## 2023-10-10 RX ADMIN — METHOCARBAMOL 750 MILLIGRAM(S): 500 TABLET, FILM COATED ORAL at 13:03

## 2023-10-10 RX ADMIN — Medication 975 MILLIGRAM(S): at 14:08
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
## 2023-10-11 ENCOUNTER — TRANSCRIPTION ENCOUNTER (OUTPATIENT)
Age: 83
End: 2023-10-11

## 2023-10-11 VITALS
RESPIRATION RATE: 18 BRPM | DIASTOLIC BLOOD PRESSURE: 71 MMHG | TEMPERATURE: 97 F | HEART RATE: 78 BPM | SYSTOLIC BLOOD PRESSURE: 109 MMHG

## 2023-10-11 LAB
ALBUMIN SERPL ELPH-MCNC: 3.9 G/DL — SIGNIFICANT CHANGE UP (ref 3.5–5.2)
ALP SERPL-CCNC: 76 U/L — SIGNIFICANT CHANGE UP (ref 30–115)
ALT FLD-CCNC: 30 U/L — SIGNIFICANT CHANGE UP (ref 0–41)
ANION GAP SERPL CALC-SCNC: 13 MMOL/L — SIGNIFICANT CHANGE UP (ref 7–14)
AST SERPL-CCNC: 25 U/L — SIGNIFICANT CHANGE UP (ref 0–41)
BASOPHILS # BLD AUTO: 0.05 K/UL — SIGNIFICANT CHANGE UP (ref 0–0.2)
BASOPHILS NFR BLD AUTO: 0.8 % — SIGNIFICANT CHANGE UP (ref 0–1)
BILIRUB SERPL-MCNC: 0.5 MG/DL — SIGNIFICANT CHANGE UP (ref 0.2–1.2)
BUN SERPL-MCNC: 33 MG/DL — HIGH (ref 10–20)
CALCIUM SERPL-MCNC: 9.4 MG/DL — SIGNIFICANT CHANGE UP (ref 8.4–10.5)
CHLORIDE SERPL-SCNC: 102 MMOL/L — SIGNIFICANT CHANGE UP (ref 98–110)
CO2 SERPL-SCNC: 22 MMOL/L — SIGNIFICANT CHANGE UP (ref 17–32)
CREAT SERPL-MCNC: 1.3 MG/DL — SIGNIFICANT CHANGE UP (ref 0.7–1.5)
EGFR: 54 ML/MIN/1.73M2 — LOW
EOSINOPHIL # BLD AUTO: 0.39 K/UL — SIGNIFICANT CHANGE UP (ref 0–0.7)
EOSINOPHIL NFR BLD AUTO: 6.5 % — SIGNIFICANT CHANGE UP (ref 0–8)
GLUCOSE SERPL-MCNC: 105 MG/DL — HIGH (ref 70–99)
HCT VFR BLD CALC: 40.2 % — LOW (ref 42–52)
HGB BLD-MCNC: 12.8 G/DL — LOW (ref 14–18)
IMM GRANULOCYTES NFR BLD AUTO: 0.7 % — HIGH (ref 0.1–0.3)
LYMPHOCYTES # BLD AUTO: 1.52 K/UL — SIGNIFICANT CHANGE UP (ref 1.2–3.4)
LYMPHOCYTES # BLD AUTO: 25.2 % — SIGNIFICANT CHANGE UP (ref 20.5–51.1)
MAGNESIUM SERPL-MCNC: 2 MG/DL — SIGNIFICANT CHANGE UP (ref 1.8–2.4)
MCHC RBC-ENTMCNC: 26.4 PG — LOW (ref 27–31)
MCHC RBC-ENTMCNC: 31.8 G/DL — LOW (ref 32–37)
MCV RBC AUTO: 83.1 FL — SIGNIFICANT CHANGE UP (ref 80–94)
MONOCYTES # BLD AUTO: 0.52 K/UL — SIGNIFICANT CHANGE UP (ref 0.1–0.6)
MONOCYTES NFR BLD AUTO: 8.6 % — SIGNIFICANT CHANGE UP (ref 1.7–9.3)
NEUTROPHILS # BLD AUTO: 3.52 K/UL — SIGNIFICANT CHANGE UP (ref 1.4–6.5)
NEUTROPHILS NFR BLD AUTO: 58.2 % — SIGNIFICANT CHANGE UP (ref 42.2–75.2)
NRBC # BLD: 0 /100 WBCS — SIGNIFICANT CHANGE UP (ref 0–0)
PLATELET # BLD AUTO: 328 K/UL — SIGNIFICANT CHANGE UP (ref 130–400)
PMV BLD: 10.4 FL — SIGNIFICANT CHANGE UP (ref 7.4–10.4)
POTASSIUM SERPL-MCNC: 4.4 MMOL/L — SIGNIFICANT CHANGE UP (ref 3.5–5)
POTASSIUM SERPL-SCNC: 4.4 MMOL/L — SIGNIFICANT CHANGE UP (ref 3.5–5)
PROT SERPL-MCNC: 6.3 G/DL — SIGNIFICANT CHANGE UP (ref 6–8)
RBC # BLD: 4.84 M/UL — SIGNIFICANT CHANGE UP (ref 4.7–6.1)
RBC # FLD: 13.7 % — SIGNIFICANT CHANGE UP (ref 11.5–14.5)
SODIUM SERPL-SCNC: 137 MMOL/L — SIGNIFICANT CHANGE UP (ref 135–146)
WBC # BLD: 6.04 K/UL — SIGNIFICANT CHANGE UP (ref 4.8–10.8)
WBC # FLD AUTO: 6.04 K/UL — SIGNIFICANT CHANGE UP (ref 4.8–10.8)

## 2023-10-11 PROCEDURE — 99239 HOSP IP/OBS DSCHRG MGMT >30: CPT

## 2023-10-11 RX ORDER — APIXABAN 2.5 MG/1
2 TABLET, FILM COATED ORAL
Qty: 90 | Refills: 0
Start: 2023-10-11 | End: 2023-10-17

## 2023-10-11 RX ADMIN — METHOCARBAMOL 750 MILLIGRAM(S): 500 TABLET, FILM COATED ORAL at 06:11

## 2023-10-11 RX ADMIN — TAMSULOSIN HYDROCHLORIDE 0.4 MILLIGRAM(S): 0.4 CAPSULE ORAL at 13:06

## 2023-10-11 RX ADMIN — Medication 48 MILLIGRAM(S): at 13:06

## 2023-10-11 RX ADMIN — Medication 10 MILLILITER(S): at 06:29

## 2023-10-11 RX ADMIN — METHOCARBAMOL 750 MILLIGRAM(S): 500 TABLET, FILM COATED ORAL at 13:06

## 2023-10-11 RX ADMIN — APIXABAN 10 MILLIGRAM(S): 2.5 TABLET, FILM COATED ORAL at 06:12

## 2023-10-11 RX ADMIN — Medication 975 MILLIGRAM(S): at 13:05

## 2023-10-11 RX ADMIN — Medication 975 MILLIGRAM(S): at 13:31

## 2023-10-11 RX ADMIN — CARVEDILOL PHOSPHATE 3.12 MILLIGRAM(S): 80 CAPSULE, EXTENDED RELEASE ORAL at 06:11

## 2023-10-11 RX ADMIN — Medication 975 MILLIGRAM(S): at 06:11

## 2023-10-11 RX ADMIN — Medication 81 MILLIGRAM(S): at 13:06

## 2023-10-11 RX ADMIN — CHLORHEXIDINE GLUCONATE 1 APPLICATION(S): 213 SOLUTION TOPICAL at 13:14

## 2023-10-11 RX ADMIN — APIXABAN 10 MILLIGRAM(S): 2.5 TABLET, FILM COATED ORAL at 18:23

## 2023-10-11 RX ADMIN — FINASTERIDE 5 MILLIGRAM(S): 5 TABLET, FILM COATED ORAL at 13:06

## 2023-10-11 NOTE — DISCHARGE NOTE NURSING/CASE MANAGEMENT/SOCIAL WORK - NSDCVIVACCINE_GEN_ALL_CORE_FT
Tdap; 01-Oct-2023 19:33; Sam Walker (RN); [employer]; [id number] (Exp. Date: 05-Oct-2025); IntraMuscular; Deltoid Left.; 0.5 milliLiter(s); VIS (VIS Published: 09-May-2013, VIS Presented: 01-Oct-2023);
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
## 2023-10-11 NOTE — DISCHARGE NOTE NURSING/CASE MANAGEMENT/SOCIAL WORK - NSDCPEFALRISK_GEN_ALL_CORE
For information on Fall & Injury Prevention, visit: [email or web address] OR  [email or web address] OR  https://www.cdc.gov/steadi/patient.html
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
## 2023-10-11 NOTE — DISCHARGE NOTE NURSING/CASE MANAGEMENT/SOCIAL WORK - PATIENT PORTAL LINK FT
You can access the FollowMyHealth Patient Portal offered by [facility] by registering at the following website: [email or web address]. By joining [facility]’s FollowMyHealth portal, you will also be able to view your health information using other applications (apps) compatible with our system.

## 2023-10-11 NOTE — PROGRESS NOTE ADULT - PROVIDER SPECIALTY LIST ADULT
Hospitalist
Internal Medicine
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
## 2023-10-15 DIAGNOSIS — Z79.82 LONG TERM (CURRENT) USE OF ASPIRIN: ICD-10-CM

## 2023-10-15 DIAGNOSIS — Z95.0 PRESENCE OF CARDIAC PACEMAKER: ICD-10-CM

## 2023-10-15 DIAGNOSIS — Z87.891 PERSONAL HISTORY OF NICOTINE DEPENDENCE: ICD-10-CM

## 2023-10-15 DIAGNOSIS — M47.812 SPONDYLOSIS WITHOUT MYELOPATHY OR RADICULOPATHY, CERVICAL REGION: ICD-10-CM

## 2023-10-15 DIAGNOSIS — I82.462 ACUTE EMBOLISM AND THROMBOSIS OF LEFT CALF MUSCULAR VEIN: ICD-10-CM
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
## 2023-10-15 DIAGNOSIS — N40.0 BENIGN PROSTATIC HYPERPLASIA WITHOUT LOWER URINARY TRACT SYMPTOMS: ICD-10-CM

## 2023-10-15 DIAGNOSIS — B97.0 ADENOVIRUS AS THE CAUSE OF DISEASES CLASSIFIED ELSEWHERE: ICD-10-CM

## 2023-10-15 DIAGNOSIS — I25.10 ATHEROSCLEROTIC HEART DISEASE OF NATIVE CORONARY ARTERY WITHOUT ANGINA PECTORIS: ICD-10-CM

## 2023-10-15 DIAGNOSIS — E78.5 HYPERLIPIDEMIA, UNSPECIFIED: ICD-10-CM

## 2023-10-15 DIAGNOSIS — N18.31 CHRONIC KIDNEY DISEASE, STAGE 3A: ICD-10-CM

## 2023-10-15 DIAGNOSIS — J06.9 ACUTE UPPER RESPIRATORY INFECTION, UNSPECIFIED: ICD-10-CM
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
## 2023-10-15 DIAGNOSIS — I12.9 HYPERTENSIVE CHRONIC KIDNEY DISEASE WITH STAGE 1 THROUGH STAGE 4 CHRONIC KIDNEY DISEASE, OR UNSPECIFIED CHRONIC KIDNEY DISEASE: ICD-10-CM
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
## 2023-10-15 DIAGNOSIS — S70.12XA CONTUSION OF LEFT THIGH, INITIAL ENCOUNTER: ICD-10-CM

## 2023-10-15 DIAGNOSIS — Z95.5 PRESENCE OF CORONARY ANGIOPLASTY IMPLANT AND GRAFT: ICD-10-CM

## 2023-10-15 DIAGNOSIS — I82.412 ACUTE EMBOLISM AND THROMBOSIS OF LEFT FEMORAL VEIN: ICD-10-CM

## 2023-11-19 NOTE — ED PROVIDER NOTE - CLINICAL SUMMARY MEDICAL DECISION MAKING FREE TEXT BOX
Opt out
Full DC instructions discussed and patient knows when to seek immediate medical attention. Patient has proper follow-up. All results discussed with the patient they may require further work-up. Limitations of ED work-up discussed. All  questions and concerns from patient or family addressed. Understanding of insturctions verbalized

## 2023-11-21 ENCOUNTER — APPOINTMENT (OUTPATIENT)
Dept: UROLOGY | Facility: CLINIC | Age: 83
End: 2023-11-21

## 2023-12-29 ENCOUNTER — APPOINTMENT (OUTPATIENT)
Dept: CARDIOLOGY | Facility: CLINIC | Age: 83
End: 2023-12-29
Payer: MEDICARE

## 2023-12-29 ENCOUNTER — NON-APPOINTMENT (OUTPATIENT)
Age: 83
End: 2023-12-29

## 2023-12-29 PROCEDURE — 93296 REM INTERROG EVL PM/IDS: CPT

## 2023-12-29 PROCEDURE — 93295 DEV INTERROG REMOTE 1/2/MLT: CPT

## 2024-02-05 ENCOUNTER — APPOINTMENT (OUTPATIENT)
Dept: UROLOGY | Facility: CLINIC | Age: 84
End: 2024-02-05

## 2024-02-12 ENCOUNTER — RX RENEWAL (OUTPATIENT)
Age: 84
End: 2024-02-12

## 2024-03-29 ENCOUNTER — NON-APPOINTMENT (OUTPATIENT)
Age: 84
End: 2024-03-29

## 2024-03-29 ENCOUNTER — APPOINTMENT (OUTPATIENT)
Dept: CARDIOLOGY | Facility: CLINIC | Age: 84
End: 2024-03-29
Payer: MEDICARE

## 2024-03-29 PROCEDURE — 93295 DEV INTERROG REMOTE 1/2/MLT: CPT

## 2024-03-29 PROCEDURE — 93296 REM INTERROG EVL PM/IDS: CPT

## 2024-04-22 ENCOUNTER — RX RENEWAL (OUTPATIENT)
Age: 84
End: 2024-04-22

## 2024-05-20 ENCOUNTER — RX RENEWAL (OUTPATIENT)
Age: 84
End: 2024-05-20

## 2024-06-26 ENCOUNTER — APPOINTMENT (OUTPATIENT)
Dept: ELECTROPHYSIOLOGY | Facility: CLINIC | Age: 84
End: 2024-06-26
Payer: MEDICARE

## 2024-06-26 VITALS
HEART RATE: 68 BPM | BODY MASS INDEX: 24.33 KG/M2 | HEIGHT: 67 IN | SYSTOLIC BLOOD PRESSURE: 140 MMHG | TEMPERATURE: 97.1 F | DIASTOLIC BLOOD PRESSURE: 82 MMHG | WEIGHT: 155 LBS

## 2024-06-26 DIAGNOSIS — I47.29 OTHER VENTRICULAR TACHYCARDIA: ICD-10-CM

## 2024-06-26 DIAGNOSIS — Z45.02 ENCOUNTER FOR ADJUSTMENT AND MANAGEMENT OF AUTOMATIC IMPLANTABLE CARDIAC DEFIBRILLATOR: ICD-10-CM

## 2024-06-26 DIAGNOSIS — I48.0 PAROXYSMAL ATRIAL FIBRILLATION: ICD-10-CM

## 2024-06-26 PROCEDURE — 93284 PRGRMG EVAL IMPLANTABLE DFB: CPT

## 2024-06-26 PROCEDURE — 93000 ELECTROCARDIOGRAM COMPLETE: CPT | Mod: 59

## 2024-06-26 PROCEDURE — 99214 OFFICE O/P EST MOD 30 MIN: CPT | Mod: 25

## 2024-06-26 PROCEDURE — 93290 INTERROG DEV EVAL ICPMS IP: CPT

## 2024-06-26 RX ORDER — ASCORBIC ACID 500 MG
500 TABLET ORAL DAILY
Refills: 0 | Status: ACTIVE | COMMUNITY

## 2024-06-26 RX ORDER — GARLIC 1000 MG
1000 CAPSULE ORAL DAILY
Refills: 0 | Status: ACTIVE | COMMUNITY

## 2024-06-26 RX ORDER — TAMSULOSIN HYDROCHLORIDE 0.4 MG/1
0.4 CAPSULE ORAL
Qty: 90 | Refills: 0 | Status: ACTIVE | COMMUNITY
Start: 2019-08-26

## 2024-06-26 RX ORDER — CHLORHEXIDINE GLUCONATE 4 %
325 (65 FE) LIQUID (ML) TOPICAL DAILY
Refills: 0 | Status: ACTIVE | COMMUNITY

## 2024-06-26 RX ORDER — ASPIRIN ENTERIC COATED TABLETS 81 MG 81 MG/1
81 TABLET, DELAYED RELEASE ORAL
Refills: 0 | Status: ACTIVE | COMMUNITY
Start: 2024-06-26

## 2024-06-26 RX ORDER — OMEGA-3/DHA/EPA/FISH OIL 1200 MG
1200 CAPSULE ORAL TWICE DAILY
Refills: 0 | Status: ACTIVE | COMMUNITY
Start: 2019-04-19

## 2024-06-26 RX ORDER — FINASTERIDE 5 MG/1
5 TABLET, FILM COATED ORAL DAILY
Qty: 90 | Refills: 0 | Status: ACTIVE | COMMUNITY
Start: 2019-10-21

## 2024-09-12 ENCOUNTER — RX RENEWAL (OUTPATIENT)
Age: 84
End: 2024-09-12

## 2024-09-27 ENCOUNTER — APPOINTMENT (OUTPATIENT)
Dept: CARDIOLOGY | Facility: CLINIC | Age: 84
End: 2024-09-27

## 2024-09-27 PROCEDURE — 93296 REM INTERROG EVL PM/IDS: CPT

## 2024-09-27 PROCEDURE — 93295 DEV INTERROG REMOTE 1/2/MLT: CPT
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
## 2024-12-18 ENCOUNTER — APPOINTMENT (OUTPATIENT)
Dept: ELECTROPHYSIOLOGY | Facility: CLINIC | Age: 84
End: 2024-12-18
Payer: MEDICARE

## 2024-12-18 VITALS
DIASTOLIC BLOOD PRESSURE: 70 MMHG | HEIGHT: 67 IN | SYSTOLIC BLOOD PRESSURE: 114 MMHG | HEART RATE: 75 BPM | WEIGHT: 148 LBS | BODY MASS INDEX: 23.23 KG/M2

## 2024-12-18 DIAGNOSIS — Z45.02 ENCOUNTER FOR ADJUSTMENT AND MANAGEMENT OF AUTOMATIC IMPLANTABLE CARDIAC DEFIBRILLATOR: ICD-10-CM

## 2024-12-18 DIAGNOSIS — I50.22 CHRONIC SYSTOLIC (CONGESTIVE) HEART FAILURE: ICD-10-CM

## 2024-12-18 DIAGNOSIS — I48.0 PAROXYSMAL ATRIAL FIBRILLATION: ICD-10-CM

## 2024-12-18 DIAGNOSIS — I47.29 OTHER VENTRICULAR TACHYCARDIA: ICD-10-CM

## 2024-12-18 DIAGNOSIS — I25.5 ISCHEMIC CARDIOMYOPATHY: ICD-10-CM

## 2024-12-18 PROCEDURE — 93290 INTERROG DEV EVAL ICPMS IP: CPT

## 2024-12-18 PROCEDURE — 93000 ELECTROCARDIOGRAM COMPLETE: CPT | Mod: 59

## 2024-12-18 PROCEDURE — 93284 PRGRMG EVAL IMPLANTABLE DFB: CPT

## 2024-12-18 PROCEDURE — 99214 OFFICE O/P EST MOD 30 MIN: CPT

## 2025-03-05 ENCOUNTER — OUTPATIENT (OUTPATIENT)
Dept: OUTPATIENT SERVICES | Facility: HOSPITAL | Age: 85
LOS: 1 days | End: 2025-03-05
Payer: MEDICARE

## 2025-03-05 VITALS
SYSTOLIC BLOOD PRESSURE: 127 MMHG | OXYGEN SATURATION: 99 % | WEIGHT: 151.9 LBS | TEMPERATURE: 97 F | HEART RATE: 75 BPM | DIASTOLIC BLOOD PRESSURE: 72 MMHG | RESPIRATION RATE: 17 BRPM | HEIGHT: 67 IN

## 2025-03-05 DIAGNOSIS — Z95.810 PRESENCE OF AUTOMATIC (IMPLANTABLE) CARDIAC DEFIBRILLATOR: Chronic | ICD-10-CM

## 2025-03-05 DIAGNOSIS — T82.111A BREAKDOWN (MECHANICAL) OF CARDIAC PULSE GENERATOR (BATTERY), INITIAL ENCOUNTER: ICD-10-CM

## 2025-03-05 DIAGNOSIS — Z01.818 ENCOUNTER FOR OTHER PREPROCEDURAL EXAMINATION: ICD-10-CM

## 2025-03-05 LAB
ALBUMIN SERPL ELPH-MCNC: 4.4 G/DL — SIGNIFICANT CHANGE UP (ref 3.5–5.2)
ALP SERPL-CCNC: 65 U/L — SIGNIFICANT CHANGE UP (ref 30–115)
ALT FLD-CCNC: 20 U/L — SIGNIFICANT CHANGE UP (ref 0–41)
ANION GAP SERPL CALC-SCNC: 11 MMOL/L — SIGNIFICANT CHANGE UP (ref 7–14)
APTT BLD: 28.8 SEC — SIGNIFICANT CHANGE UP (ref 27–39.2)
AST SERPL-CCNC: 23 U/L — SIGNIFICANT CHANGE UP (ref 0–41)
BASOPHILS # BLD AUTO: 0.03 K/UL — SIGNIFICANT CHANGE UP (ref 0–0.2)
BASOPHILS NFR BLD AUTO: 0.5 % — SIGNIFICANT CHANGE UP (ref 0–1)
BILIRUB SERPL-MCNC: 0.7 MG/DL — SIGNIFICANT CHANGE UP (ref 0.2–1.2)
BLD GP AB SCN SERPL QL: SIGNIFICANT CHANGE UP
BUN SERPL-MCNC: 21 MG/DL — HIGH (ref 10–20)
CALCIUM SERPL-MCNC: 9.7 MG/DL — SIGNIFICANT CHANGE UP (ref 8.4–10.5)
CHLORIDE SERPL-SCNC: 105 MMOL/L — SIGNIFICANT CHANGE UP (ref 98–110)
CO2 SERPL-SCNC: 27 MMOL/L — SIGNIFICANT CHANGE UP (ref 17–32)
CREAT SERPL-MCNC: 1.5 MG/DL — SIGNIFICANT CHANGE UP (ref 0.7–1.5)
EGFR: 45 ML/MIN/1.73M2 — LOW
EGFR: 45 ML/MIN/1.73M2 — LOW
EOSINOPHIL # BLD AUTO: 0.19 K/UL — SIGNIFICANT CHANGE UP (ref 0–0.7)
EOSINOPHIL NFR BLD AUTO: 3.4 % — SIGNIFICANT CHANGE UP (ref 0–8)
GLUCOSE SERPL-MCNC: 118 MG/DL — HIGH (ref 70–99)
HCT VFR BLD CALC: 41.5 % — LOW (ref 42–52)
HGB BLD-MCNC: 13.2 G/DL — LOW (ref 14–18)
IMM GRANULOCYTES NFR BLD AUTO: 0.2 % — SIGNIFICANT CHANGE UP (ref 0.1–0.3)
LYMPHOCYTES # BLD AUTO: 1.85 K/UL — SIGNIFICANT CHANGE UP (ref 1.2–3.4)
LYMPHOCYTES # BLD AUTO: 32.7 % — SIGNIFICANT CHANGE UP (ref 20.5–51.1)
MCHC RBC-ENTMCNC: 27.6 PG — SIGNIFICANT CHANGE UP (ref 27–31)
MCHC RBC-ENTMCNC: 31.8 G/DL — LOW (ref 32–37)
MCV RBC AUTO: 86.6 FL — SIGNIFICANT CHANGE UP (ref 80–94)
MONOCYTES # BLD AUTO: 0.44 K/UL — SIGNIFICANT CHANGE UP (ref 0.1–0.6)
MONOCYTES NFR BLD AUTO: 7.8 % — SIGNIFICANT CHANGE UP (ref 1.7–9.3)
NEUTROPHILS # BLD AUTO: 3.13 K/UL — SIGNIFICANT CHANGE UP (ref 1.4–6.5)
NEUTROPHILS NFR BLD AUTO: 55.4 % — SIGNIFICANT CHANGE UP (ref 42.2–75.2)
NRBC BLD AUTO-RTO: 0 /100 WBCS — SIGNIFICANT CHANGE UP (ref 0–0)
PLATELET # BLD AUTO: 218 K/UL — SIGNIFICANT CHANGE UP (ref 130–400)
PMV BLD: 11.3 FL — HIGH (ref 7.4–10.4)
POTASSIUM SERPL-MCNC: 3.9 MMOL/L — SIGNIFICANT CHANGE UP (ref 3.5–5)
POTASSIUM SERPL-SCNC: 3.9 MMOL/L — SIGNIFICANT CHANGE UP (ref 3.5–5)
PROT SERPL-MCNC: 7.2 G/DL — SIGNIFICANT CHANGE UP (ref 6–8)
RBC # BLD: 4.79 M/UL — SIGNIFICANT CHANGE UP (ref 4.7–6.1)
RBC # FLD: 14.2 % — SIGNIFICANT CHANGE UP (ref 11.5–14.5)
SODIUM SERPL-SCNC: 143 MMOL/L — SIGNIFICANT CHANGE UP (ref 135–146)
WBC # BLD: 5.65 K/UL — SIGNIFICANT CHANGE UP (ref 4.8–10.8)
WBC # FLD AUTO: 5.65 K/UL — SIGNIFICANT CHANGE UP (ref 4.8–10.8)

## 2025-03-05 PROCEDURE — 85025 COMPLETE CBC W/AUTO DIFF WBC: CPT

## 2025-03-05 PROCEDURE — 36415 COLL VENOUS BLD VENIPUNCTURE: CPT

## 2025-03-05 PROCEDURE — 99214 OFFICE O/P EST MOD 30 MIN: CPT | Mod: 25

## 2025-03-05 PROCEDURE — 87641 MR-STAPH DNA AMP PROBE: CPT

## 2025-03-05 PROCEDURE — 93010 ELECTROCARDIOGRAM REPORT: CPT

## 2025-03-05 PROCEDURE — 85730 THROMBOPLASTIN TIME PARTIAL: CPT

## 2025-03-05 PROCEDURE — 86901 BLOOD TYPING SEROLOGIC RH(D): CPT

## 2025-03-05 PROCEDURE — 87086 URINE CULTURE/COLONY COUNT: CPT

## 2025-03-05 PROCEDURE — 85610 PROTHROMBIN TIME: CPT

## 2025-03-05 PROCEDURE — 80053 COMPREHEN METABOLIC PANEL: CPT

## 2025-03-05 PROCEDURE — 87640 STAPH A DNA AMP PROBE: CPT

## 2025-03-05 PROCEDURE — 86900 BLOOD TYPING SEROLOGIC ABO: CPT

## 2025-03-05 PROCEDURE — 87186 SC STD MICRODIL/AGAR DIL: CPT

## 2025-03-05 PROCEDURE — 86850 RBC ANTIBODY SCREEN: CPT

## 2025-03-05 PROCEDURE — 93005 ELECTROCARDIOGRAM TRACING: CPT

## 2025-03-05 PROCEDURE — 81003 URINALYSIS AUTO W/O SCOPE: CPT

## 2025-03-05 NOTE — H&P PST ADULT - REASON FOR ADMISSION
85 y/o male here for PAST. H/O CAD, MI S/P PTCA with stent, ICM/ HF -NYHA II , S/P CRT- D , PAF, GI bleed formerly on Eliquis. He underwent a Watchman procedure on 7/6/2017, S/P HILARIA , Stopped OAC on 8/24/2017. Patient states his battery is running low on his AICD device.   NOW FOR SCHEDULED BI-VENTRICULAR ICD BATTERY CHANGE.

## 2025-03-05 NOTE — H&P PST ADULT - HISTORY OF PRESENT ILLNESS
PATIENT DENIES CHEST PAIN, SHORTNESS OF BREATH, PALPITATIONS, COUGHING, FEVER, DYSURIA.    NO COUGH, FEVER, SORE THROAT, HEADACHE, LOSS OF TASTE OR SMELL. NO KNOWN EXPOSURE TO ANYONE WITH COVID. PATIENT WAS INSTRUCTED TO ISOLATE FROM NOW UNTIL THE SURGERY.    Anesthesia Alert  NO--Difficult Airway  NO--History of neck surgery or radiation  NO--Limited ROM of neck  NO--History of Malignant hyperthermia  NO--Personal or family history of Pseudocholinesterase deficiency  NO--Prior Anesthesia Complication  NO--Latex Allergy  NO--Loose teeth  NO--History of Rheumatoid Arthritis  NO--JESS  SLIGHT BLEEDING RISK (ON ASA)  [employer] AICD    RCRI=1  DASI=9.89 METS

## 2025-03-06 DIAGNOSIS — Z01.818 ENCOUNTER FOR OTHER PREPROCEDURAL EXAMINATION: ICD-10-CM

## 2025-03-06 DIAGNOSIS — T82.111A BREAKDOWN (MECHANICAL) OF CARDIAC PULSE GENERATOR (BATTERY), INITIAL ENCOUNTER: ICD-10-CM

## 2025-03-06 LAB
INR BLD: 0.9 RATIO — SIGNIFICANT CHANGE UP (ref 0.65–1.3)
MRSA PCR RESULT.: NEGATIVE — SIGNIFICANT CHANGE UP
PROTHROM AB SERPL-ACNC: 10.6 SEC — SIGNIFICANT CHANGE UP (ref 9.95–12.87)

## 2025-03-17 ENCOUNTER — TRANSCRIPTION ENCOUNTER (OUTPATIENT)
Age: 85
End: 2025-03-17

## 2025-03-17 ENCOUNTER — OUTPATIENT (OUTPATIENT)
Dept: OUTPATIENT SERVICES | Facility: HOSPITAL | Age: 85
LOS: 1 days | Discharge: HOME | End: 2025-03-17
Payer: MEDICARE

## 2025-03-17 ENCOUNTER — APPOINTMENT (OUTPATIENT)
Dept: ELECTROPHYSIOLOGY | Facility: HOSPITAL | Age: 85
End: 2025-03-17

## 2025-03-17 VITALS — SYSTOLIC BLOOD PRESSURE: 150 MMHG | DIASTOLIC BLOOD PRESSURE: 76 MMHG | OXYGEN SATURATION: 97 % | HEART RATE: 58 BPM

## 2025-03-17 VITALS
OXYGEN SATURATION: 99 % | DIASTOLIC BLOOD PRESSURE: 90 MMHG | WEIGHT: 151.9 LBS | TEMPERATURE: 98 F | HEART RATE: 60 BPM | SYSTOLIC BLOOD PRESSURE: 157 MMHG

## 2025-03-17 DIAGNOSIS — T82.111A BREAKDOWN (MECHANICAL) OF CARDIAC PULSE GENERATOR (BATTERY), INITIAL ENCOUNTER: ICD-10-CM

## 2025-03-17 DIAGNOSIS — Z95.810 PRESENCE OF AUTOMATIC (IMPLANTABLE) CARDIAC DEFIBRILLATOR: Chronic | ICD-10-CM

## 2025-03-17 LAB — BLD GP AB SCN SERPL QL: SIGNIFICANT CHANGE UP

## 2025-03-17 PROCEDURE — C1882: CPT

## 2025-03-17 PROCEDURE — 86850 RBC ANTIBODY SCREEN: CPT

## 2025-03-17 PROCEDURE — C1889: CPT

## 2025-03-17 PROCEDURE — 33264 RMVL & RPLCMT DFB GEN MLT LD: CPT

## 2025-03-17 PROCEDURE — 86901 BLOOD TYPING SEROLOGIC RH(D): CPT

## 2025-03-17 PROCEDURE — 86900 BLOOD TYPING SEROLOGIC ABO: CPT

## 2025-03-17 PROCEDURE — 36415 COLL VENOUS BLD VENIPUNCTURE: CPT

## 2025-03-17 RX ORDER — APIXABAN 2.5 MG/1
5 TABLET, FILM COATED ORAL EVERY 12 HOURS
Refills: 0 | Status: DISCONTINUED | OUTPATIENT
Start: 2025-03-17 | End: 2025-03-17

## 2025-03-17 RX ORDER — CEFAZOLIN SODIUM IN 0.9 % NACL 3 G/100 ML
1000 INTRAVENOUS SOLUTION, PIGGYBACK (ML) INTRAVENOUS ONCE
Refills: 0 | Status: COMPLETED | OUTPATIENT
Start: 2025-03-17 | End: 2025-03-17

## 2025-03-17 RX ORDER — CEPHALEXIN 250 MG/1
1 CAPSULE ORAL
Qty: 14 | Refills: 0
Start: 2025-03-17 | End: 2025-03-23

## 2025-03-17 RX ORDER — ACETAMINOPHEN 500 MG/5ML
650 LIQUID (ML) ORAL ONCE
Refills: 0 | Status: COMPLETED | OUTPATIENT
Start: 2025-03-17 | End: 2025-03-17

## 2025-03-17 RX ORDER — CEFAZOLIN SODIUM IN 0.9 % NACL 3 G/100 ML
2000 INTRAVENOUS SOLUTION, PIGGYBACK (ML) INTRAVENOUS ONCE
Refills: 0 | Status: COMPLETED | OUTPATIENT
Start: 2025-03-17 | End: 2025-03-17

## 2025-03-17 RX ADMIN — Medication 100 MILLIGRAM(S): at 08:42

## 2025-03-17 RX ADMIN — Medication 650 MILLIGRAM(S): at 10:14

## 2025-03-17 RX ADMIN — Medication 100 MILLIGRAM(S): at 09:09

## 2025-03-17 NOTE — ASU DISCHARGE PLAN (ADULT/PEDIATRIC) - ASU DISCHARGE DATE/TIME
Doing well. Feels sinusitis: congestion for 1 week. Using advil cold and sinus. No fevers or chills since day 1. F/U 4 weeks.   17-Mar-2025 10:56

## 2025-03-17 NOTE — ASU DISCHARGE PLAN (ADULT/PEDIATRIC) - FINANCIAL ASSISTANCE
[facility] provides services at a reduced cost to those who are determined to be eligible through [facility]’s financial assistance program. Information regarding [facility]’s financial assistance program can be found by going to [email or web address] or by calling [phone].

## 2025-03-17 NOTE — ASU DISCHARGE PLAN (ADULT/PEDIATRIC) - NS MD DC FALL RISK RISK
For information on Fall & Injury Prevention, visit: [email or web address] OR  [email or web address] OR  https://www.cdc.gov/steadi/patient.html

## 2025-03-17 NOTE — ASU PATIENT PROFILE, ADULT - PRO INTERPRETER NEED 2
Ventricular Rate : 117   Atrial Rate : 117   P-R Interval : 136   QRS Duration : 94   Q-T Interval : 362   QTC Calculation(Bezet) : 504   P Axis : 78   R Axis : 71   T Axis : 57   Diagnosis : Poor data quality in current ECG precludes serial comparison~Sinus tachycardia~Possible Left atrial enlargement~Incomplete right bundle branch block~Borderline ECG~~Confirmed by TORREY CEDILLO ([id number]) on 10/9/2018 4:35:47 PM      English

## 2025-03-17 NOTE — PROGRESS NOTE ADULT - SUBJECTIVE AND OBJECTIVE BOX
Electrophysiology Brief Post-Op Note      PRE-OP DIAGNOSIS: CHF    POST-OP DIAGNOSIS: CHF    PROCEDURE: generator change    Vendor Representative was present for clinical support.    Physician: Fatimah  Assistant: None    ESTIMATED BLOOD LOSS:   5    mL    ANESTHESIA TYPE:  [  ]General Anesthesia  [ X ] Sedation  [ X ] Local/Regional    CONDITION  [  ] Critical  [  ] Serious  [  ]Fair  [ X ]Good      SPECIMENS REMOVED (IF APPLICABLE):  icd    IMPLANTS (IF APPLICABLE)  icd    FINDINGS: none    COMPLICATIONS: none     PLAN OF CARE  - Keflex  - FU 3-4 weeks                  
I have personally seen and examined the patient.  I agree with the history and physical which I have reviewed and noted any changes below.

## 2025-03-17 NOTE — ASU DISCHARGE PLAN (ADULT/PEDIATRIC) - ASU DC SPECIAL INSTRUCTIONSFT
No shower, bathtub, no wetting device site for 5 days.  Remove dressing tomorrow /    Can take a shower on _ Sat _ , , leave Steri-strips in place  Keflex 500mg twice daily for 7 day (Rx sent to [facility])    Follow up in EP office for wound check in 1 month /

## 2025-03-18 DIAGNOSIS — T82.111A BREAKDOWN (MECHANICAL) OF CARDIAC PULSE GENERATOR (BATTERY), INITIAL ENCOUNTER: ICD-10-CM
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
## 2025-04-24 ENCOUNTER — APPOINTMENT (OUTPATIENT)
Dept: ELECTROPHYSIOLOGY | Facility: CLINIC | Age: 85
End: 2025-04-24
Payer: MEDICARE

## 2025-04-24 VITALS
SYSTOLIC BLOOD PRESSURE: 100 MMHG | DIASTOLIC BLOOD PRESSURE: 70 MMHG | HEART RATE: 84 BPM | BODY MASS INDEX: 23.39 KG/M2 | HEIGHT: 67 IN | WEIGHT: 149 LBS

## 2025-04-24 DIAGNOSIS — Z45.02 ENCOUNTER FOR ADJUSTMENT AND MANAGEMENT OF AUTOMATIC IMPLANTABLE CARDIAC DEFIBRILLATOR: ICD-10-CM

## 2025-04-24 DIAGNOSIS — I48.0 PAROXYSMAL ATRIAL FIBRILLATION: ICD-10-CM

## 2025-04-24 DIAGNOSIS — I25.5 ISCHEMIC CARDIOMYOPATHY: ICD-10-CM

## 2025-04-24 DIAGNOSIS — Z48.89 ENCOUNTER FOR OTHER SPECIFIED SURGICAL AFTERCARE: ICD-10-CM

## 2025-04-24 DIAGNOSIS — I47.29 OTHER VENTRICULAR TACHYCARDIA: ICD-10-CM

## 2025-04-24 DIAGNOSIS — I25.10 ATHEROSCLEROTIC HEART DISEASE OF NATIVE CORONARY ARTERY W/OUT ANGINA PECTORIS: ICD-10-CM

## 2025-04-24 PROCEDURE — 93290 INTERROG DEV EVAL ICPMS IP: CPT

## 2025-04-24 PROCEDURE — 93284 PRGRMG EVAL IMPLANTABLE DFB: CPT

## 2025-04-24 PROCEDURE — 99024 POSTOP FOLLOW-UP VISIT: CPT

## 2025-07-18 ENCOUNTER — APPOINTMENT (OUTPATIENT)
Dept: CARDIOLOGY | Facility: CLINIC | Age: 85
End: 2025-07-18

## 2025-07-18 PROCEDURE — 93296 REM INTERROG EVL PM/IDS: CPT

## 2025-07-18 PROCEDURE — 93295 DEV INTERROG REMOTE 1/2/MLT: CPT
